# Patient Record
Sex: FEMALE | Race: WHITE
[De-identification: names, ages, dates, MRNs, and addresses within clinical notes are randomized per-mention and may not be internally consistent; named-entity substitution may affect disease eponyms.]

---

## 2017-05-09 PROBLEM — Z00.00 ENCOUNTER FOR PREVENTIVE HEALTH EXAMINATION: Status: ACTIVE | Noted: 2017-05-09

## 2017-08-18 ENCOUNTER — APPOINTMENT (OUTPATIENT)
Dept: INTERNAL MEDICINE | Facility: CLINIC | Age: 82
End: 2017-08-18

## 2018-04-14 ENCOUNTER — INPATIENT (INPATIENT)
Facility: HOSPITAL | Age: 83
LOS: 15 days | Discharge: NURSING, REHAB OR LONG-TERM CARE | End: 2018-04-30
Attending: INTERNAL MEDICINE | Admitting: INTERNAL MEDICINE
Payer: MEDICARE

## 2018-04-14 VITALS
SYSTOLIC BLOOD PRESSURE: 142 MMHG | OXYGEN SATURATION: 100 % | DIASTOLIC BLOOD PRESSURE: 89 MMHG | RESPIRATION RATE: 22 BRPM | HEART RATE: 100 BPM

## 2018-04-14 DIAGNOSIS — Z98.890 OTHER SPECIFIED POSTPROCEDURAL STATES: Chronic | ICD-10-CM

## 2018-04-14 LAB
ALBUMIN SERPL ELPH-MCNC: 3.2 G/DL — LOW (ref 3.3–5)
ALP SERPL-CCNC: 78 U/L — SIGNIFICANT CHANGE UP (ref 40–120)
ALT FLD-CCNC: 17 U/L — SIGNIFICANT CHANGE UP (ref 12–78)
ANION GAP SERPL CALC-SCNC: 10 MMOL/L — SIGNIFICANT CHANGE UP (ref 5–17)
APPEARANCE UR: CLEAR — SIGNIFICANT CHANGE UP
APTT BLD: 25 SEC — LOW (ref 27.5–37.4)
AST SERPL-CCNC: 24 U/L — SIGNIFICANT CHANGE UP (ref 15–37)
BACTERIA # UR AUTO: (no result)
BASOPHILS # BLD AUTO: 0.07 K/UL — SIGNIFICANT CHANGE UP (ref 0–0.2)
BASOPHILS NFR BLD AUTO: 0.5 % — SIGNIFICANT CHANGE UP (ref 0–2)
BILIRUB SERPL-MCNC: 0.4 MG/DL — SIGNIFICANT CHANGE UP (ref 0.2–1.2)
BILIRUB UR-MCNC: NEGATIVE — SIGNIFICANT CHANGE UP
BUN SERPL-MCNC: 22 MG/DL — SIGNIFICANT CHANGE UP (ref 7–23)
CALCIUM SERPL-MCNC: 9.6 MG/DL — SIGNIFICANT CHANGE UP (ref 8.5–10.1)
CHLORIDE SERPL-SCNC: 103 MMOL/L — SIGNIFICANT CHANGE UP (ref 96–108)
CK SERPL-CCNC: 58 U/L — SIGNIFICANT CHANGE UP (ref 26–192)
CO2 SERPL-SCNC: 27 MMOL/L — SIGNIFICANT CHANGE UP (ref 22–31)
COLOR SPEC: YELLOW — SIGNIFICANT CHANGE UP
COMMENT - URINE: SIGNIFICANT CHANGE UP
CREAT SERPL-MCNC: 0.88 MG/DL — SIGNIFICANT CHANGE UP (ref 0.5–1.3)
DIFF PNL FLD: (no result)
EOSINOPHIL # BLD AUTO: 0.07 K/UL — SIGNIFICANT CHANGE UP (ref 0–0.5)
EOSINOPHIL NFR BLD AUTO: 0.5 % — SIGNIFICANT CHANGE UP (ref 0–6)
EPI CELLS # UR: SIGNIFICANT CHANGE UP
GLUCOSE SERPL-MCNC: 131 MG/DL — HIGH (ref 70–99)
GLUCOSE UR QL: NEGATIVE MG/DL — SIGNIFICANT CHANGE UP
HBA1C BLD-MCNC: 5.2 % — SIGNIFICANT CHANGE UP (ref 4–5.6)
HCT VFR BLD CALC: 41.2 % — SIGNIFICANT CHANGE UP (ref 34.5–45)
HGB BLD-MCNC: 13.8 G/DL — SIGNIFICANT CHANGE UP (ref 11.5–15.5)
IMM GRANULOCYTES NFR BLD AUTO: 0.5 % — SIGNIFICANT CHANGE UP (ref 0–1.5)
INR BLD: 1.04 RATIO — SIGNIFICANT CHANGE UP (ref 0.88–1.16)
KETONES UR-MCNC: NEGATIVE — SIGNIFICANT CHANGE UP
LEUKOCYTE ESTERASE UR-ACNC: (no result)
LYMPHOCYTES # BLD AUTO: 18 % — SIGNIFICANT CHANGE UP (ref 13–44)
LYMPHOCYTES # BLD AUTO: 2.69 K/UL — SIGNIFICANT CHANGE UP (ref 1–3.3)
MCHC RBC-ENTMCNC: 30.3 PG — SIGNIFICANT CHANGE UP (ref 27–34)
MCHC RBC-ENTMCNC: 33.5 GM/DL — SIGNIFICANT CHANGE UP (ref 32–36)
MCV RBC AUTO: 90.5 FL — SIGNIFICANT CHANGE UP (ref 80–100)
MONOCYTES # BLD AUTO: 0.62 K/UL — SIGNIFICANT CHANGE UP (ref 0–0.9)
MONOCYTES NFR BLD AUTO: 4.1 % — SIGNIFICANT CHANGE UP (ref 2–14)
NEUTROPHILS # BLD AUTO: 11.42 K/UL — HIGH (ref 1.8–7.4)
NEUTROPHILS NFR BLD AUTO: 76.4 % — SIGNIFICANT CHANGE UP (ref 43–77)
NITRITE UR-MCNC: NEGATIVE — SIGNIFICANT CHANGE UP
NRBC # BLD: 0 /100 WBCS — SIGNIFICANT CHANGE UP (ref 0–0)
PH UR: 5 — SIGNIFICANT CHANGE UP (ref 5–8)
PLATELET # BLD AUTO: 492 K/UL — HIGH (ref 150–400)
POTASSIUM SERPL-MCNC: 3.9 MMOL/L — SIGNIFICANT CHANGE UP (ref 3.5–5.3)
POTASSIUM SERPL-SCNC: 3.9 MMOL/L — SIGNIFICANT CHANGE UP (ref 3.5–5.3)
PROT SERPL-MCNC: 7 GM/DL — SIGNIFICANT CHANGE UP (ref 6–8.3)
PROT UR-MCNC: 30 MG/DL
PROTHROM AB SERPL-ACNC: 11.2 SEC — SIGNIFICANT CHANGE UP (ref 9.8–12.7)
RBC # BLD: 4.55 M/UL — SIGNIFICANT CHANGE UP (ref 3.8–5.2)
RBC # FLD: 12.7 % — SIGNIFICANT CHANGE UP (ref 10.3–14.5)
RBC CASTS # UR COMP ASSIST: SIGNIFICANT CHANGE UP /HPF (ref 0–4)
SODIUM SERPL-SCNC: 140 MMOL/L — SIGNIFICANT CHANGE UP (ref 135–145)
SP GR SPEC: 1.01 — SIGNIFICANT CHANGE UP (ref 1.01–1.02)
TROPONIN I SERPL-MCNC: <0.015 NG/ML — SIGNIFICANT CHANGE UP (ref 0.01–0.04)
TSH SERPL-MCNC: 0.01 UIU/ML — LOW (ref 0.36–3.74)
UROBILINOGEN FLD QL: NEGATIVE MG/DL — SIGNIFICANT CHANGE UP
WBC # BLD: 14.95 K/UL — HIGH (ref 3.8–10.5)
WBC # FLD AUTO: 14.95 K/UL — HIGH (ref 3.8–10.5)
WBC UR QL: SIGNIFICANT CHANGE UP

## 2018-04-14 PROCEDURE — 70496 CT ANGIOGRAPHY HEAD: CPT | Mod: 26

## 2018-04-14 PROCEDURE — 99291 CRITICAL CARE FIRST HOUR: CPT

## 2018-04-14 PROCEDURE — 70450 CT HEAD/BRAIN W/O DYE: CPT | Mod: 26,59

## 2018-04-14 PROCEDURE — 99285 EMERGENCY DEPT VISIT HI MDM: CPT

## 2018-04-14 PROCEDURE — 93010 ELECTROCARDIOGRAM REPORT: CPT

## 2018-04-14 PROCEDURE — 70551 MRI BRAIN STEM W/O DYE: CPT | Mod: 26

## 2018-04-14 RX ORDER — METOPROLOL TARTRATE 50 MG
25 TABLET ORAL
Qty: 0 | Refills: 0 | Status: DISCONTINUED | OUTPATIENT
Start: 2018-04-14 | End: 2018-04-30

## 2018-04-14 RX ORDER — LEVOTHYROXINE SODIUM 125 MCG
125 TABLET ORAL DAILY
Qty: 0 | Refills: 0 | Status: DISCONTINUED | OUTPATIENT
Start: 2018-04-15 | End: 2018-04-16

## 2018-04-14 RX ORDER — LEVOTHYROXINE SODIUM 125 MCG
62.5 TABLET ORAL ONCE
Qty: 0 | Refills: 0 | Status: COMPLETED | OUTPATIENT
Start: 2018-04-14 | End: 2018-04-14

## 2018-04-14 RX ORDER — ASPIRIN/CALCIUM CARB/MAGNESIUM 324 MG
325 TABLET ORAL ONCE
Qty: 0 | Refills: 0 | Status: COMPLETED | OUTPATIENT
Start: 2018-04-14 | End: 2018-04-14

## 2018-04-14 RX ORDER — ATORVASTATIN CALCIUM 80 MG/1
80 TABLET, FILM COATED ORAL AT BEDTIME
Qty: 0 | Refills: 0 | Status: DISCONTINUED | OUTPATIENT
Start: 2018-04-14 | End: 2018-04-30

## 2018-04-14 RX ORDER — ASPIRIN/CALCIUM CARB/MAGNESIUM 324 MG
81 TABLET ORAL DAILY
Qty: 0 | Refills: 0 | Status: DISCONTINUED | OUTPATIENT
Start: 2018-04-15 | End: 2018-04-19

## 2018-04-14 RX ORDER — HEPARIN SODIUM 5000 [USP'U]/ML
5000 INJECTION INTRAVENOUS; SUBCUTANEOUS EVERY 12 HOURS
Qty: 0 | Refills: 0 | Status: DISCONTINUED | OUTPATIENT
Start: 2018-04-14 | End: 2018-04-16

## 2018-04-14 RX ADMIN — HEPARIN SODIUM 5000 UNIT(S): 5000 INJECTION INTRAVENOUS; SUBCUTANEOUS at 17:10

## 2018-04-14 RX ADMIN — Medication 325 MILLIGRAM(S): at 11:11

## 2018-04-14 RX ADMIN — Medication 25 MILLIGRAM(S): at 17:11

## 2018-04-14 NOTE — H&P ADULT - ASSESSMENT
87 year old female with pmhx HTN, unknown valvular disease and hypothyroidism present with acute fall and onset of RUE and RLE weakness.     1. RUE and RLE weakness; acute CVA confirmed on CTA and MRI. NIHSS score of 6; not a candidate for TPA due to timing. Discussed case with Dr. Cook at [facility], again deemed not a candidate for neurovascular intervention  -received  mg po x 1 dose. Will continue with 81 mg po daily starting tomorrow  -high dose statin; atorvastatin 80 mg po daily  -swallow evaluation, NPO until then  -permissive HTN, will attempt to maintain SBP around 180 mmg Hg  -neurology recommendations appreciated; will hold off AC over next 24 to 48 hours  -q1hr neurochecks, fall precautions  -PT/OT    2. New onset atrial fibrillation  -CHADS VASC - needs AC, but will hold until neuro clearance  -continue with rate control, given dose of cardizem in ED, will start on cardizem prn for now  -TTE    3. HTN on bystolic at home  -cardizem for now    4. Hypothyroidism  -TSH  -will restart home dose of levothyroxine 125 mcg  po daily, will give IV dose now 62.5 mcg in setting of npo status    DVT ppx with SCDs and Hep ppx dosing for now 87 year old female with pmhx HTN, unknown valvular disease and hypothyroidism present with acute fall and onset of RUE and RLE weakness.     1. RUE and RLE weakness; acute CVA confirmed on CTA and MRI. NIHSS score of 6; not a candidate for TPA due to timing. Discussed case with Dr. Cook at [facility], again deemed not a candidate for neurovascular intervention  -received  mg po x 1 dose. Will continue with 81 mg po daily starting tomorrow  -high dose statin; atorvastatin 80 mg po daily  -swallow evaluation, NPO until then  -permissive HTN, will attempt to maintain SBP around 180 mmg Hg  -neurology recommendations appreciated; will hold off AC over next 24 to 48 hours  -q1hr neurochecks, fall precautions  -PT/OT    2. New onset atrial fibrillation  -based on CHADS VASC score - will require AC, but will hold until neuro clearance  -continue with rate control, given dose of cardizem in ED, will start on metoprolol BID for now  -TTE    3. HTN on bystolic at home  -metoprolol BID for now    4. Hypothyroidism  -TSH  -will restart home dose of levothyroxine 125 mcg  po daily, will give IV dose now 62.5 mcg and start on po once tolerates diet    DVT ppx with SCDs and Hep ppx dosing for now

## 2018-04-14 NOTE — H&P ADULT - NSHPPHYSICALEXAM_GEN_ALL_CORE
Gen: AAOx3, NAD  HEENT: NCAT, EOMI  Neck: Supple  CV: nml S1S2, irregularly irregular, noted apical and RSB holosystolic murmur 4/6  Lungs: CTABL  Abd: Soft, NT, ND, BS+  Ext: No edema  Neuro: 2/5 strength of RUE, 0/5 strength of RLE, sensation intact in all extremities, CN II-XII intact

## 2018-04-14 NOTE — ED ADULT NURSE NOTE - OBJECTIVE STATEMENT
Pt reports right upper extremity weakness that is different from her normal. Pt able to move arm and hand, but unable to sustain any lifting of arm. Pt reports chronic weakness of right lower extremity s/p scarlet fever, but thinks that there might be increased weakness. Pt unable to definitively describe normal level of strength. Pt Pt reports right upper extremity weakness that is different from her normal. Pt able to move arm and hand, but unable to sustain any lifting of arm. Pt reports chronic weakness of right lower extremity s/p scarlet fever, but thinks that there might be increased weakness. Pt unable to definitively describe normal level of strength. Pt to CT from ambulance triage. Currently denies pain, but reports that she was feeling dizzy this am.  Pt is alert and able to answer questions appropriately and make needs known, but is vague on some medical details.  Pt is somewhat repetitive with some requests and questions.  Unknown baseline status.  Unknown start time for symptoms. Pt states she woke with symptoms.  Unable to determine if she had any arm weakness yesterday and pt is vague on details at times.  Resp even and unlabored.

## 2018-04-14 NOTE — ED PROVIDER NOTE - MEDICAL DECISION MAKING DETAILS
pt with possible new onset afib with sroke symptoms will get ct and wait for labs to get cta as pt symptoms are waxing and waning

## 2018-04-14 NOTE — CONSULT NOTE ADULT - SUBJECTIVE AND OBJECTIVE BOX
· Reason for Referral/Consultation:	Atrial flutter and sroke	      · Subjective and Objective: 	  Patient is a 87y old  Female who presents with a chief complaint of Rt side weakness woke up with sx this morning.    HPI:  She is 88 Yo Female brought to ER with woke up this morning with Rt side arm and leg weakness went to bed last night at 10 pm. Could not move Rt arm or leg well with difficulty with her speech noted in ED. Not a TPA candidate but stat CT/ cTA performed noted Thrombus in left   MCA and ED physician on called  [facility] stroke team spoke with Dr. Cook and decided not a candidate for Thrombectomy and rec admission at  and further rx. Pt denies of any prior neurological sx and headaches, chest pain, SOB or palpitations. H/o Hypothyroidism takes medication. Lives home alone. Being followed by Dr. Blackburn but doesn't recall when she was seen last.     Examined at bed side, feels better, denies angina, orthopnea, PND, leg swelling, palpitations, syncope. Has no h/o cardiac arrhythmia or CAD.      PAST MEDICAL & SURGICAL HISTORY:  Hypothyroidism  Cataract surgery      FAMILY HISTORY:N/a      Social Hx:  Nonsmoker, no drug or alcohol use    MEDICATIONS  (STANDING):   Synthroid    Allergies    No Known Allergies      ROS: Pertinent positives in HPI, all other ROS were reviewed and are negative.      Vital Signs Last 24 Hrs  T(C): 36.3 (14 Apr 2018 09:33), Max: 36.3 (14 Apr 2018 09:33)  T(F): 97.3 (14 Apr 2018 09:33), Max: 97.3 (14 Apr 2018 09:33)  HR: 100 (14 Apr 2018 11:13) (93 - 100)  BP: 157/54 (14 Apr 2018 12:58) (139/50 - 162/57)  BP(mean): --  RR: 18 (14 Apr 2018 12:58) (18 - 22)  SpO2: 100% (14 Apr 2018 12:58) (97% - 100%)        Constitutional: awake and alert, mild dysarthria  HEENT: PERRLA, EOMI,   Neck: Supple.  Respiratory: Breath sounds are clear bilaterally  Cardiovascular: S1 and S2,  irregular rhythm  Gastrointestinal: soft, nontender  Extremities:  no edema  Vascular:  Carotid Bruit - no  Musculoskeletal: no joint swelling/tenderness.  Skin: No rashes    Neurological exam:  HF: A x O x 3. Appropriately interactive, normal affect. Speech dysarthric, occ word finding difficulty  CN: DIVYA, EOMI, VF, Rt Facial weakness with droop, gag intact.   Motor: Rt side drift with weakness Rt UE 3-4/5 Rt LE 3/5 left side 5/5   Sens: Intact to light touch   Reflexes:  +2 Brisk on Rt > than left Plantars Up going on Rt down on left  Coord:  can not test on rt    Gait/Balance: Cannot test    NIHSS: 7          Labs:   04-14    140  |  103  |  22  ----------------------------<  131<H>  3.9   |  27  |  0.88    Ca    9.6      14 Apr 2018 08:11    TPro  7.0  /  Alb  3.2<L>  /  TBili  0.4  /  DBili  x   /  AST  24  /  ALT  17  /  AlkPhos  78  04-14                              13.8   14.95 )-----------( 492      ( 14 Apr 2018 08:11 )             41.2       Radiology:  - CT Head:4/14/18  < from: CT Brain Stroke Protocol (04.14.18 @ 08:02) >  Impression:    Age related involutional and microvascular ischemic changes, without   evidence of intracranial hemorrhage, mass effect or midline shift.     CTA carotids/Brain  < from: CT Angio Head w/ IV Cont (04.14.18 @ 10:09) >  IMPRESSION:          1.   Right carotid system:  No hemodynamically significant stenosis.          2.   Left carotid system:  moderate calcified plaque resulting in a   high-grade (80-90%) stenosis at the origin of the LEFT internal carotid   artery and mild stenosis at the origin of the LEFT external carotid   artery.         3.   Intracranial circulation: Occlusion with cut off of the mid LEFT   M1 segment with a 1 cm thrombus. There is faint flow in distal LEFT MCA   branches.        4.   Brain:  No significant lesion identified.      - MRI brain 4/14/18    < from: MR Head No Cont (04.14.18 @ 12:13) >  IMPRESSION: Acute small left basal ganglia/corona radiata infarct.   Findings discussed with the referring team by Dr. Pederson.              Assessment and Recommendation:   · Assessment		  86 y/o F p/w RLE and RUE weakness. Pt states she awoke this morning (went to bed around 10pm) and could not move her arm or leg which prompted her to have EMS bring her to the ER CT/CTA +ve for acute thrombus in left MCA ED Physician Dr. Richardson spoke with Dr. Chase from stroke center he states pt is not a candidate for thrombectomy tba to [facility]     * New onset AF, fairly rate controlled at the moment. CHADSVASC score 7 ( HTN, Age >75, Stroke, Vascular disease, female).  Needs AC when safe from neuro stand point, agree with UFH first and when stroke is stable then possibly with a NOAC.  IF needed for rate control, can start PO diltiazem if able to tolerate oral route, otherwise can use IV drip.  2d echo, tele.    * Left carotid artery stenosis, ipsilateral to stroke and > 70% stenosis, agree with obtaining opinion from  vascular surgery re: CEA vs PTA. Otherwise, cont antiplatelet, statin. This stroke could be embolic from AF but also artery to artery embolization is possible.     Will follow.

## 2018-04-14 NOTE — ED ADULT TRIAGE NOTE - CHIEF COMPLAINT QUOTE
Pt complains of slurred speech and R arm weakness upon awakening this morning.  Right arm weak, no speech deficit, on initial assessment.  Code Stroke 0752 per Dr. Richardson.

## 2018-04-14 NOTE — ED PROVIDER NOTE - PROGRESS NOTE DETAILS
Scribe DG: due to pt awaking with Sx, tPA is not indicated, will order CTA to r/o thrombus. Junaid FORREST: notified by radiology of a 1cm thrombus present on CTA, will consult neurology and transfer pt to [facility] for thrombectomy Junaid FORREST: case d/w Dr. Dsouza of neurology, recommends consulting [facility] for transfer. spoke with Dr. Chase from stroke center he states pt is not a candidate for thrombectomy tba to [facility]. JOVANNY Richardson DO

## 2018-04-14 NOTE — H&P ADULT - HISTORY OF PRESENT ILLNESS
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 Patient states at roughly 4:30 AM this morning as she was getting out of bed to go to bathroom, getting dizzy and falling to ground. She reports no head trauma during the event. She states she was on floor for 2 hours, and had difficulty getting up to her feet. A phone was noted nearby and she called 911. She notes during her time on the floor having noted right arm weakness  and leg weakness that was intermittent but progressively worsening. She notes having falls in the past due to "2 bum knees" and having difficulty getting up due to her joint issues. She states yesterday feeling well and reports no weakness, dizziness, palpitations, chest pain or shortness of breath. In ED, it was noted that patient had atrial fibrillation rhythm and CTA head demonstrated occlusion with cut off of the mid left M1 segment with a 1 cm thrombus and high grade stenosis of left carotid artery. Dr. [facility] was [facility] by ED attending for probable neurovascular intervention but was deemed to be not a candidate by Dr. Cook due to age, lesion location and NIHSS score. She is presently laying in bed, resting comfortably, concerned regarding her arm and leg weakness but otherwise reports no dizziness, headache, vision changes.

## 2018-04-14 NOTE — ED PROVIDER NOTE - OBJECTIVE STATEMENT
86 y/o F p/w RLE and RUE weakness. Pt states she awoke this morning and could not move her arm or leg which prompted her to have EMS bring her to the ED. Sx noticed upon awaking. No CP, SOB, v/d. Code Stroke called. Source is a poor historian, states she had past issues with her RLE and is unsure if this is changed from her baseline. 88 y/o F p/w RLE and RUE weakness. Pt states she awoke this morning (went to bed around 10pm) and could not move her arm or leg which prompted her to have EMS bring her to the ED. Sx noticed upon awaking. No CP, SOB, v/d. Code Stroke called. Source is a poor historian, states she had past issues with her RLE and is unsure if this is changed from her baseline.

## 2018-04-14 NOTE — PATIENT PROFILE ADULT. - PRO SERVICES AT [facility]
Referral received via [facility] from MONAE Shelton MD who saw patient in  12/7/2017 for upper airway cough syndrome.  Patient was encouraged to establish with a PCP.    Writer placed call to patient -message left.     
home health care

## 2018-04-14 NOTE — H&P ADULT - NSHPREVIEWOFSYSTEMS_GEN_ALL_CORE
(-)Fever, chills, cough, chest pain, sob, headache, dizziness, palpitations, abd pain, n/v/d, leg swelling  (+)weakness

## 2018-04-14 NOTE — CONSULT NOTE ADULT - SUBJECTIVE AND OBJECTIVE BOX
Patient is a 87y old  Female who presents with a chief complaint of Rt side weakness woke up with sx this morning.    HPI:  She is 88 Yo Female brought to ER with woke up this morning with Rt side arm and leg weakness went to bed last night at 10 pm. Could not move Rt arm or leg well with difficulty with her speech noted in ED. Not a TPA candidate but stat CT/ cTA performed noted Thrombus in left   MCA and ED physician on called  [facility] stroke team spoke with Dr. Cook and decided not a candidate for Thrombectomy and rec admission at  and further rx. Pt denies of any prior neurological sx and headaches, chest pain, SOB or palpitations. H/o Hypothyroidism takes medication. Lives home alone. Being followed by Dr. Blackburn but doesn't recall when she was seen last.       PAST MEDICAL & SURGICAL HISTORY:  Hypothyroidism  Cataract surgery      FAMILY HISTORY:N/a      Social Hx:  Nonsmoker, no drug or alcohol use    MEDICATIONS  (STANDING):   Synthroid    Allergies    No Known Allergies      ROS: Pertinent positives in HPI, all other ROS were reviewed and are negative.      Vital Signs Last 24 Hrs  T(C): 36.3 (14 Apr 2018 09:33), Max: 36.3 (14 Apr 2018 09:33)  T(F): 97.3 (14 Apr 2018 09:33), Max: 97.3 (14 Apr 2018 09:33)  HR: 100 (14 Apr 2018 11:13) (93 - 100)  BP: 157/54 (14 Apr 2018 12:58) (139/50 - 162/57)  BP(mean): --  RR: 18 (14 Apr 2018 12:58) (18 - 22)  SpO2: 100% (14 Apr 2018 12:58) (97% - 100%)        Constitutional: awake and alert, mild dysarthria  HEENT: PERRLA, EOMI,   Neck: Supple.  Respiratory: Breath sounds are clear bilaterally  Cardiovascular: S1 and S2,  irregular rhythm  Gastrointestinal: soft, nontender  Extremities:  no edema  Vascular:  Carotid Bruit - no  Musculoskeletal: no joint swelling/tenderness.  Skin: No rashes    Neurological exam:  HF: A x O x 3. Appropriately interactive, normal affect. Speech dysarthric, occ word finding difficulty  CN: DIVYA, EOMI, VF, Rt Facial weakness with droop, gag intact.   Motor: Rt side drift with weakness Rt UE 3-4/5 Rt LE 3/5 left side 5/5   Sens: Intact to light touch   Reflexes:  +2 Brisk on Rt > than left Plantars Up going on Rt down on left  Coord:  can not test on rt    Gait/Balance: Cannot test    NIHSS: 7          Labs:   04-14    140  |  103  |  22  ----------------------------<  131<H>  3.9   |  27  |  0.88    Ca    9.6      14 Apr 2018 08:11    TPro  7.0  /  Alb  3.2<L>  /  TBili  0.4  /  DBili  x   /  AST  24  /  ALT  17  /  AlkPhos  78  04-14                              13.8   14.95 )-----------( 492      ( 14 Apr 2018 08:11 )             41.2       Radiology:  - CT Head:4/14/18  < from: CT Brain Stroke Protocol (04.14.18 @ 08:02) >  Impression:    Age related involutional and microvascular ischemic changes, without   evidence of intracranial hemorrhage, mass effect or midline shift.     CTA carotids/Brain  < from: CT Angio Head w/ IV Cont (04.14.18 @ 10:09) >  IMPRESSION:          1.   Right carotid system:  No hemodynamically significant stenosis.          2.   Left carotid system:  moderate calcified plaque resulting in a   high-grade (80-90%) stenosis at the origin of the LEFT internal carotid   artery and mild stenosis at the origin of the LEFT external carotid   artery.         3.   Intracranial circulation: Occlusion with cut off of the mid LEFT   M1 segment with a 1 cm thrombus. There is faint flow in distal LEFT MCA   branches.        4.   Brain:  No significant lesion identified.      - MRI brain 4/14/18    < from: MR Head No Cont (04.14.18 @ 12:13) >  IMPRESSION: Acute small left basal ganglia/corona radiata infarct.   Findings discussed with the referring team by Dr. Pederson.

## 2018-04-14 NOTE — CONSULT NOTE ADULT - ASSESSMENT
86 y/o F p/w RLE and RUE weakness. Pt states she awoke this morning (went to bed around 10pm) and could not move her arm or leg which prompted her to have EMS bring her to the ER CT/CTA +ve for acute throbus in left MCA ED Physician Dr. Richardson spoke with Dr. Chase from stroke center he states pt is not a candidate for thrombectomy tba to [facility].     Acute Left CVA with Rt Hemiparesis Outside the window for TPA  With New onset AF.  Acute Infarct on MRI scan.  REc admit to telemetry/ cardiac admission with close neuro checks.  Convert to sinus rhythm  with medical management.  ASa/ plavix/ statin.  Hold AC for 24- 48 hrs. if start too early might convert into hemorrhagic CVA with heparin.  Speech/swallow evaluation.  PT/ OT.  Cardiology F/u.  Vascular surgery consult with  for left carotid stenosis.  will f/u.

## 2018-04-14 NOTE — H&P ADULT - NSHPLABSRESULTS_GEN_ALL_CORE
T(C): 36.7 (18 @ 14:57), Max: 36.7 (18 @ 14:57)  HR: 93 (18 @ 14:57) (93 - 100)  BP: 144/83 (18 @ 14:57) (139/50 - 162/57)  RR: 18 (18 @ 14:57) (18 - 22)  SpO2: 98% (18 @ 14:57) (97% - 100%)  Wt(kg): --    CARDIAC MARKERS ( 2018 08:11 )  <0.015 ng/mL / x     / 58 U/L / x     / x                                13.8   14.95 )-----------( 492      ( 2018 08:11 )             41.2     2018 08:11    140    |  103    |  22     ----------------------------<  131    3.9     |  27     |  0.88     Ca    9.6        2018 08:11    TPro  7.0    /  Alb  3.2    /  TBili  0.4    /  DBili  x      /  AST  24     /  ALT  17     /  AlkPhos  78     2018 08:11    PT/INR - ( 2018 08:11 )   PT: 11.2 sec;   INR: 1.04 ratio         PTT - ( 2018 08:11 )  PTT:25.0 sec  CAPILLARY BLOOD GLUCOSE      POCT Blood Glucose.: 129 mg/dL (2018 08:07)    LIVER FUNCTIONS - ( 2018 08:11 )  Alb: 3.2 g/dL / Pro: 7.0 gm/dL / ALK PHOS: 78 U/L / ALT: 17 U/L / AST: 24 U/L / GGT: x           Urinalysis Basic - ( 2018 08:11 )    Color: Yellow / Appearance: Clear / S.010 / pH: x  Gluc: x / Ketone: Negative  / Bili: Negative / Urobili: Negative mg/dL   Blood: x / Protein: 30 mg/dL / Nitrite: Negative   Leuk Esterase: Trace / RBC: 0-2 /HPF / WBC 0-2   Sq Epi: x / Non Sq Epi: Few / Bacteria: Occasional      CTA head:  IMPRESSION:          1.   Right carotid system:  No hemodynamically significant stenosis.          2.   Left carotid system:  moderate calcified plaque resulting in a   high-grade (80-90%) stenosis at the origin of the LEFT internal carotid   artery and mild stenosis at the origin of the LEFT external carotid   artery.         3.   Intracranial circulation: Occlusion with cut off of the mid LEFT   M1 segment with a 1 cm thrombus. There is faint flow in distal LEFT MCA   branches.        4.   Brain:  No significant lesion identified.          EXAM:  MR BRAIN                            PROCEDURE DATE:  2018          INTERPRETATION:  CLINICAL HISTORY: Right arm weakness    COMPARISON: CT head dated 2018    TECHNIQUE: MRI brain: Multiplanar, multisequence MR imaging of the brain   are obtained without the administration of intravenous gadolinium.     FINDINGS:  There is a small area of abnormal restricted diffusion in the posterior   left corona radiata extending into the basal ganglia inferiorly on images   45 through 48 of series 3. Old small left inferior cerebellar hemisphere   lacunar infarct.. Scattered periventricular and subcortical white matter   T2 /FLAIR hyperintensities are seen without mass effect, nonspecific,   likely representing mild to chronic microvascular changes.    Normal T2 flow-voids are seen within  the intracranial vasculature. The   lateral ventricles and cortical sulci are age-appropriate in size and   configuration. There is no mass, mass effect, or extra-axial fluid   collection. There is no susceptibility artifact to suggest hemorrhage.   Midline structures are normal. The patient is status post left ocular   lens replacement surgery. The visualized paranasal sinuses, mastoid air   cells and orbits are otherwise unremarkable.      IMPRESSION: Acute small left basal ganglia/corona radiata infarct.

## 2018-04-15 LAB
ANION GAP SERPL CALC-SCNC: 9 MMOL/L — SIGNIFICANT CHANGE UP (ref 5–17)
APTT BLD: 28.1 SEC — SIGNIFICANT CHANGE UP (ref 27.5–37.4)
BUN SERPL-MCNC: 20 MG/DL — SIGNIFICANT CHANGE UP (ref 7–23)
CALCIUM SERPL-MCNC: 9 MG/DL — SIGNIFICANT CHANGE UP (ref 8.5–10.1)
CHLORIDE SERPL-SCNC: 105 MMOL/L — SIGNIFICANT CHANGE UP (ref 96–108)
CHOLEST SERPL-MCNC: 191 MG/DL — SIGNIFICANT CHANGE UP (ref 10–199)
CO2 SERPL-SCNC: 26 MMOL/L — SIGNIFICANT CHANGE UP (ref 22–31)
CREAT SERPL-MCNC: 0.68 MG/DL — SIGNIFICANT CHANGE UP (ref 0.5–1.3)
ESTIMATED AVERAGE GLUCOSE: 105 MG/DL — SIGNIFICANT CHANGE UP (ref 68–114)
GLUCOSE SERPL-MCNC: 101 MG/DL — HIGH (ref 70–99)
HBA1C BLD-MCNC: 5.3 % — SIGNIFICANT CHANGE UP (ref 4–5.6)
HBA1C BLD-MCNC: 5.3 % — SIGNIFICANT CHANGE UP (ref 4–5.6)
HDLC SERPL-MCNC: 47 MG/DL — SIGNIFICANT CHANGE UP (ref 40–125)
INR BLD: 1.12 RATIO — SIGNIFICANT CHANGE UP (ref 0.88–1.16)
LIPID PNL WITH DIRECT LDL SERPL: 125 MG/DL — SIGNIFICANT CHANGE UP
POTASSIUM SERPL-MCNC: 3.9 MMOL/L — SIGNIFICANT CHANGE UP (ref 3.5–5.3)
POTASSIUM SERPL-SCNC: 3.9 MMOL/L — SIGNIFICANT CHANGE UP (ref 3.5–5.3)
PROTHROM AB SERPL-ACNC: 12.1 SEC — SIGNIFICANT CHANGE UP (ref 9.8–12.7)
SODIUM SERPL-SCNC: 140 MMOL/L — SIGNIFICANT CHANGE UP (ref 135–145)
TOTAL CHOLESTEROL/HDL RATIO MEASUREMENT: 4.1 RATIO — SIGNIFICANT CHANGE UP (ref 3.3–7.1)
TRIGL SERPL-MCNC: 93 MG/DL — SIGNIFICANT CHANGE UP (ref 10–149)

## 2018-04-15 PROCEDURE — 99233 SBSQ HOSP IP/OBS HIGH 50: CPT

## 2018-04-15 RX ORDER — WARFARIN SODIUM 2.5 MG/1
2.5 TABLET ORAL DAILY
Qty: 0 | Refills: 0 | Status: DISCONTINUED | OUTPATIENT
Start: 2018-04-15 | End: 2018-04-16

## 2018-04-15 RX ADMIN — HEPARIN SODIUM 5000 UNIT(S): 5000 INJECTION INTRAVENOUS; SUBCUTANEOUS at 05:47

## 2018-04-15 RX ADMIN — Medication 25 MILLIGRAM(S): at 17:49

## 2018-04-15 RX ADMIN — Medication 81 MILLIGRAM(S): at 11:48

## 2018-04-15 RX ADMIN — WARFARIN SODIUM 2.5 MILLIGRAM(S): 2.5 TABLET ORAL at 21:35

## 2018-04-15 RX ADMIN — Medication 125 MICROGRAM(S): at 05:47

## 2018-04-15 RX ADMIN — HEPARIN SODIUM 5000 UNIT(S): 5000 INJECTION INTRAVENOUS; SUBCUTANEOUS at 17:49

## 2018-04-15 NOTE — PROGRESS NOTE ADULT - SUBJECTIVE AND OBJECTIVE BOX
· Reason for Referral/Consultation:	Atrial flutter and sroke	      · Subjective and Objective: 	  Patient is a 87y old  Female who presents with a chief complaint of Rt side weakness woke up with sx this morning.    HPI:  She is 88 Yo Female brought to ER with woke up this morning with Rt side arm and leg weakness went to bed last night at 10 pm. Could not move Rt arm or leg well with difficulty with her speech noted in ED. Not a TPA candidate but stat CT/ cTA performed noted Thrombus in left   MCA and ED physician on called  [facility] stroke team spoke with Dr. Cook and decided not a candidate for Thrombectomy and rec admission at  and further rx. Pt denies of any prior neurological sx and headaches, chest pain, SOB or palpitations. H/o Hypothyroidism takes medication. Lives home alone. Being followed by Dr. Blackburn but doesn't recall when she was seen last.     Examined at bed side, feels better, denies angina, orthopnea, PND, leg swelling, palpitations, syncope. Has no h/o cardiac arrhythmia or CAD.    4/15 - no new complaints. Tele reviewed, AF rate controlled. Agree with BB for rate control. Stable from CV stand point.   Cont current regimen. Start AC when safe from neuro standpoint. Pending vascular surgery consult re: SID. Agree with high intensity statin and ASA. ECHO pending.   Dr Blackburn to follow up in AM.     PAST MEDICAL & SURGICAL HISTORY:  Hypothyroidism  Cataract surgery      FAMILY HISTORY:N/a      Social Hx:  Nonsmoker, no drug or alcohol use    MEDICATIONS  (STANDING):   Synthroid    Allergies    No Known Allergies      ROS: Pertinent positives in HPI, all other ROS were reviewed and are negative.      Vital Signs Last 24 Hrs  T(C): 36.3 (14 Apr 2018 09:33), Max: 36.3 (14 Apr 2018 09:33)  T(F): 97.3 (14 Apr 2018 09:33), Max: 97.3 (14 Apr 2018 09:33)  HR: 100 (14 Apr 2018 11:13) (93 - 100)  BP: 157/54 (14 Apr 2018 12:58) (139/50 - 162/57)  BP(mean): --  RR: 18 (14 Apr 2018 12:58) (18 - 22)  SpO2: 100% (14 Apr 2018 12:58) (97% - 100%)        Constitutional: awake and alert, mild dysarthria  HEENT: PERRLA, EOMI,   Neck: Supple.  Respiratory: Breath sounds are clear bilaterally  Cardiovascular: S1 and S2,  irregular rhythm  Gastrointestinal: soft, nontender  Extremities:  no edema  Vascular:  Carotid Bruit - no  Musculoskeletal: no joint swelling/tenderness.  Skin: No rashes    Neurological exam:  HF: A x O x 3. Appropriately interactive, normal affect. Speech dysarthric, occ word finding difficulty  CN: DIVYA, EOMI, VF, Rt Facial weakness with droop, gag intact.   Motor: Rt side drift with weakness Rt UE 3-4/5 Rt LE 3/5 left side 5/5   Sens: Intact to light touch   Reflexes:  +2 Brisk on Rt > than left Plantars Up going on Rt down on left  Coord:  can not test on rt    Gait/Balance: Cannot test    NIHSS: 7          Labs:   04-14    140  |  103  |  22  ----------------------------<  131<H>  3.9   |  27  |  0.88    Ca    9.6      14 Apr 2018 08:11    TPro  7.0  /  Alb  3.2<L>  /  TBili  0.4  /  DBili  x   /  AST  24  /  ALT  17  /  AlkPhos  78  04-14                              13.8   14.95 )-----------( 492      ( 14 Apr 2018 08:11 )             41.2       Radiology:  - CT Head:4/14/18  < from: CT Brain Stroke Protocol (04.14.18 @ 08:02) >  Impression:    Age related involutional and microvascular ischemic changes, without   evidence of intracranial hemorrhage, mass effect or midline shift.     CTA carotids/Brain  < from: CT Angio Head w/ IV Cont (04.14.18 @ 10:09) >  IMPRESSION:          1.   Right carotid system:  No hemodynamically significant stenosis.          2.   Left carotid system:  moderate calcified plaque resulting in a   high-grade (80-90%) stenosis at the origin of the LEFT internal carotid   artery and mild stenosis at the origin of the LEFT external carotid   artery.         3.   Intracranial circulation: Occlusion with cut off of the mid LEFT   M1 segment with a 1 cm thrombus. There is faint flow in distal LEFT MCA   branches.        4.   Brain:  No significant lesion identified.      - MRI brain 4/14/18    < from: MR Head No Cont (04.14.18 @ 12:13) >  IMPRESSION: Acute small left basal ganglia/corona radiata infarct.   Findings discussed with the referring team by Dr. Pederson.              Assessment and Recommendation:   · Assessment		  86 y/o F p/w RLE and RUE weakness. Pt states she awoke this morning (went to bed around 10pm) and could not move her arm or leg which prompted her to have EMS bring her to the ER CT/CTA +ve for acute thrombus in left MCA ED Physician Dr. Richardson spoke with Dr. Chase from stroke center he states pt is not a candidate for thrombectomy [facility]ba to [facility]     * New onset AF, fairly rate controlled at the moment. CHADSVASC score 7 ( HTN, Age >75, Stroke, Vascular disease, female).  Needs AC when safe from neuro stand point, agree with UFH first and when stroke is stable then possibly with a NOAC.  IF needed for rate control, can start PO diltiazem if able to tolerate oral route, otherwise can use IV drip.  2d echo, tele.    * Left carotid artery stenosis, ipsilateral to stroke and > 70% stenosis, agree with obtaining opinion from  vascular surgery re: CEA vs PTA. Otherwise, cont antiplatelet, statin. This stroke could be embolic from AF but also artery to artery embolization is possible.     Will follow.

## 2018-04-15 NOTE — PROGRESS NOTE ADULT - SUBJECTIVE AND OBJECTIVE BOX
· Requested by Name:		  · Date/Time:	14-Apr-2018 13:28	  · Reason for Referral/Consultation:	CVA with Rt side weakness	      · Subjective and Objective: 	  Patient is a 87y old  Female who presents with a chief complaint of Rt side weakness woke up with sx this morning.    HPI:  She is 88 Yo Female brought to ER with woke up this morning with Rt side arm and leg weakness went to bed last night at 10 pm. Could not move Rt arm or leg well with difficulty with her speech noted in ED. Not a TPA candidate but stat CT/ cTA performed noted Thrombus in left   MCA and ED physician on called  [facility] stroke team spoke with Dr. Cook and decided not a candidate for Thrombectomy and rec admission at  and further rx. Pt denies of any prior neurological sx and headaches, chest pain, SOB or palpitations. H/o Hypothyroidism takes medication. Lives home alone. Being followed by Dr. Blackburn but doesn't recall when she was seen last.     4/15/18 : Pt feeling better Speech improved and Rt UE weakness improved still feels weak she can not use as well as she wants. RT LE weakness persisting. NO Headaches, dizziness. Appreciated Cardiology consult and Echo and vascular surgery consults pending.     PAST MEDICAL & SURGICAL HISTORY:  Hypothyroidism  Cataract surgery    MEDICATIONS  (STANDING):  aspirin enteric coated 81 milliGRAM(s) Oral daily  atorvastatin 80 milliGRAM(s) Oral at bedtime  heparin  Injectable 5000 Unit(s) SubCutaneous every 12 hours  levothyroxine 125 MICROGram(s) Oral daily  metoprolol tartrate 25 milliGRAM(s) Oral two times a day      Vital Signs Last 24 Hrs  T(C): 36.6 (15 Apr 2018 05:13), Max: 36.7 (14 Apr 2018 14:57)  T(F): 97.9 (15 Apr 2018 05:13), Max: 98 (14 Apr 2018 14:57)  HR: 79 (15 Apr 2018 05:13) (79 - 100)  BP: 111/81 (15 Apr 2018 05:13) (104/60 - 162/57)  BP(mean): 86 (15 Apr 2018 05:13) (86 - 86)  RR: 98 (14 Apr 2018 16:20) (18 - 98)  SpO2: 99% (15 Apr 2018 05:13) (97% - 100%)     Constitutional: awake and alert,  dysarthria improved.  HEENT: PERRLA, EOMI,   Neck: Supple.  Respiratory: Breath sounds are clear bilaterally  Cardiovascular: S1 and S2,  irregular rhythm  Gastrointestinal: soft, nontender  Extremities:  no edema  Vascular:  Carotid Bruit - no  Musculoskeletal: no joint swelling/tenderness.  Skin: No rashes    Neurological exam:  HF: A x O x 3. Appropriately interactive, normal affect. Speech dysarthria improved.  CN: DIVYA, EOMI, VF, mild Rt Facial  droop, weakness improved,  gag intact.   Motor: Rt side drift with weakness Rt UE 4/5 Rt LE 4/5 left side 5/5   Sens: Intact to light touch   Reflexes:  +2 Brisk on Rt > than left Plantars Up going on Rt down on left  Coord:  can not test on rt    Gait/Balance: Cannot test                       13.8   14.95 )-----------( 492      ( 14 Apr 2018 08:11 )             41.2     04-15    140  |  105  |  20  ----------------------------<  101<H>  3.9   |  26  |  0.68    Ca    9.0      15 Apr 2018 07:20    TPro  7.0  /  Alb  3.2<L>  /  TBili  0.4  /  DBili  x   /  AST  24  /  ALT  17  /  AlkPhos  78  04-14 04-14 [id number] 5.2        Radiology report:  - CT Head:4/14/18  < from: CT Brain Stroke Protocol (04.14.18 @ 08:02) >  Impression:    Age related involutional and microvascular ischemic changes, without   evidence of intracranial hemorrhage, mass effect or midline shift.     CTA carotids/Brain  < from: CT Angio Head w/ IV Cont (04.14.18 @ 10:09) >  IMPRESSION:          1.   Right carotid system:  No hemodynamically significant stenosis.          2.   Left carotid system:  moderate calcified plaque resulting in a   high-grade (80-90%) stenosis at the origin of the LEFT internal carotid   artery and mild stenosis at the origin of the LEFT external carotid   artery.         3.   Intracranial circulation: Occlusion with cut off of the mid LEFT   M1 segment with a 1 cm thrombus. There is faint flow in distal LEFT MCA   branches.        4.   Brain:  No significant lesion identified.      - MRI brain 4/14/18    < from: MR Head No Cont (04.14.18 @ 12:13) >  IMPRESSION: Acute small left basal ganglia/corona radiata infarct.   Findings discussed with the referring team by Dr. Pederson.

## 2018-04-15 NOTE — PHYSICAL THERAPY INITIAL EVALUATION ADULT - PERTINENT HX OF CURRENT PROBLEM, REHAB EVAL
Pt. presents to  after having fall at home. Pt. stood up from bed, became dizzy and fell. Pt. was on floor for two hours until finally standing. Pt. also c/o increased R upper and lower extremity weakness. MRI: acute small basal ganglia/corona radiata infarct. Afib noted in ED

## 2018-04-15 NOTE — PROGRESS NOTE ADULT - ASSESSMENT
A/P    #Acute CVA-  ct asa/statin, echo to follow, ct tele   -supportive care     #Afib- discussed with Dr. Dsouza- start coumadin tonight     #ICA stensosis- us study to follow. Dr. Alexander evaluation appreciated     #PT, rehab placement     #above discussed with pt and pt's son after taking permission from pt-  Hqv-[phone]. All questions have been answered

## 2018-04-15 NOTE — CONSULT NOTE ADULT - SUBJECTIVE AND OBJECTIVE BOX
Consult Note:   · Provider Specialty	Vascular	    Referral/Consultation:   Initial Consult:  · Requested by Name:		  · Date/Time:	 	  · Reason for Referral/Consultation:	CVA with Rt side weakness, carotid stenosis	      · Subjective and Objective: 	  Patient is a 87y old  Female who presents with a chief complaint of Rt side weakness woke up with sx this morning.    HPI:  She is 88 Yo Female brought to ER with woke up this morning with Rt side arm and leg weakness after going to bed last night at 10 pm. Could not move Rt arm or leg well with difficulty with her speech noted in ED. Not a TPA candidate but stat CT/ cTA performed noted thrombus in left MCA and ED physician on called  [facility] stroke team and spoke with Dr. Cook who decided she was not a candidate for thrombectomy.  They recommended admission at [facility] and further rx. Pt denies of any prior neurological sx and headaches, chest pain, SOB or palpitations. H/o Hypothyroidism takes medication. Lives home alone. Being followed by Dr. Blackburn but doesn't recall when she was seen last.  Found to have AF while here and notes she has been breathless for several weeks with minimal exertion (new onset of AF?).        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      Vital Signs Last 24 Hrs  T(C): 36.3 (14 Apr 2018 09:33), Max: 36.3 (14 Apr 2018 09:33)  T(F): 97.3 (14 Apr 2018 09:33), Max: 97.3 (14 Apr 2018 09:33)  HR: 100 (14 Apr 2018 11:13) (93 - 100)  BP: 157/54 (14 Apr 2018 12:58) (139/50 - 162/57)  BP(mean): --  RR: 18 (14 Apr 2018 12:58) (18 - 22)  SpO2: 100% (14 Apr 2018 12:58) (97% - 100%)        Constitutional: awake and alert, mild dysarthria  HEENT: PERRLA, EOMI,   Neck: Supple.  Respiratory: Breath sounds are clear bilaterally  Cardiovascular: S1 and S2,  irregular rhythm  Gastrointestinal: soft, nontender  Extremities:  no edema  Vascular:  Carotid Bruit - no; both feet without ulcers, cyanosis or gangrene; R leg shorter than L; no palpable pedal pulse either foot  Musculoskeletal: no joint swelling/tenderness.  Skin: No rashes    Neurological exam:  HF: A x O x 3. Appropriately interactive, normal affect. Speech dysarthric, occ word finding difficulty  CN: DIVYA, EOMI, VF, Rt Facial weakness with droop, gag intact.   Motor: Rt side drift with weakness Rt UE 3-4/5 Rt LE 3/5 left side 5/5   Sens: Intact to light touch   Reflexes:  +2 Brisk on Rt > than left Plantars Up going on Rt down on left  Coord:  can not test on rt    Gait/Balance: Cannot test    NIHSS: 7          Labs:   04-14    140  |  103  |  22  ----------------------------<  131<H>  3.9   |  27  |  0.88    Ca    9.6      14 Apr 2018 08:11    TPro  7.0  /  Alb  3.2<L>  /  TBili  0.4  /  DBili  x   /  AST  24  /  ALT  17  /  AlkPhos  78  04-14                              13.8   14.95 )-----------( 492      ( 14 Apr 2018 08:11 )             41.2       Radiology:  - CT Head:4/14/18  < from: CT Brain Stroke Protocol (04.14.18 @ 08:02) >  Impression:    Age related involutional and microvascular ischemic changes, without   evidence of intracranial hemorrhage, mass effect or midline shift.     CTA carotids/Brain  < from: CT Angio Head w/ IV Cont (04.14.18 @ 10:09) >  IMPRESSION:          1.   Right carotid system:  No hemodynamically significant stenosis.          2.   Left carotid system:  moderate calcified plaque resulting in a   high-grade (80-90%) stenosis at the origin of the LEFT internal carotid   artery and mild stenosis at the origin of the LEFT external carotid   artery.         3.   Intracranial circulation: Occlusion with cut off of the mid LEFT   M1 segment with a 1 cm thrombus. There is faint flow in distal LEFT MCA   branches.        4.   Brain:  No significant lesion identified.      - MRI brain 4/14/18    < from: MR Head No Cont (04.14.18 @ 12:13) >  IMPRESSION: Acute small left basal ganglia/corona radiata infarct.   Findings discussed with the referring team by Dr. Pederson.              Assessment and Recommendation:   · Assessment		  86 y/o F p/w RLE and RUE weakness. Pt states she awoke this morning (went to bed around 10pm) and could not move her arm or leg which prompted her to have EMS bring her to the ER CT/CTA +ve for acute throbus in left MCA ED Physician Dr. Richardson spoke with Dr. Chase from stroke center he states pt is not a candidate for thrombectomy tba to [facility].     Acute Left CVA with Rt Hemiparesis Outside the window for TPA  With New onset AF.  Acute Infarct on MRI scan in white matter, no cortical infarct noted despite M1 thrombus  REc admit to telemetry/ cardiac admission with close neuro checks.  Convert to sinus rhythm  with medical management.  ASa/ plavix/ statin.  Hold AC for 24- 48 hrs. if start too early might convert into hemorrhagic CVA with heparin.  Speech/swallow evaluation.  PT/ OT.        Will discuss with Dr. Dsouza whether symptoms related to basal ganglia infarct and not to cortical reason implying high grade L ICA stenosis seen on CTA not the reason for her symptoms    will obtain carotid duplex

## 2018-04-15 NOTE — PROGRESS NOTE ADULT - ASSESSMENT
Assessment and Recommendation:   · Assessment		  86 y/o F p/w RLE and RUE weakness. Pt states she awoke this morning (went to bed around 10pm) and could not move her arm or leg which prompted her to have EMS bring her to the ER CT/CTA +ve for acute throbus in left MCA ED Physician Dr. Richardson spoke with Dr. Chase from stroke center he states pt is not a candidate for thrombectomy tba to [facility].     Acute Left CVA with Rt Hemiparesis Outside the window for TPA  With New onset AF.  Acute Infarct on MRI scan.  REc admit to telemetry/ cardiac admission with close neuro checks.  Convert to sinus rhythm  with medical management.  ASa/ plavix/ statin.  If Pt remains stable today can start low dose coumadin tonight .  F/u Ct brain in 48 hrs unless any new neurological c/o.  Carotid dopplers.  TTE/ NAN .  Will f/u.

## 2018-04-15 NOTE — PHYSICAL THERAPY INITIAL EVALUATION ADULT - ACTIVE RANGE OF MOTION EXAMINATION, REHAB EVAL
bilateral upper extremity Active ROM was WFL (within functional limits)/Limited passive and active  L elbow extension and R knee flexion. Charcot remigio tooth b/l feet/bilateral  lower extremity Active ROM was WFL (within functional limits)

## 2018-04-15 NOTE — PROGRESS NOTE ADULT - SUBJECTIVE AND OBJECTIVE BOX
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 Patient states at roughly 4:30 AM on day of admission as she was getting out of bed to go to bathroom, getting dizzy and falling to ground. She reports no head trauma during the event. She states she was on floor for 2 hours, and had difficulty getting up to her feet.   A phone was noted nearby and she called 911. She notes during her time on the floor having noted right arm weakness  and leg weakness that was intermittent but progressively worsening. She notes having falls in the past due to "2 bum knees" and having difficulty getting up due to her joint issues. In ED, it was noted that patient had atrial fibrillation rhythm and CTA head demonstrated occlusion with cut off of the mid left M1 segment with a 1 cm thrombus and high grade stenosis of left carotid artery. Dr. Cook/Hugh Stroke physician was called by ED attending for probable neurovascular intervention but was deemed to be not a candidate by Dr. Cook due to age, lesion location and NIHSS score. She is presently laying in bed, resting comfortably, concerned regarding her arm and leg weakness but otherwise reports no dizziness, headache, vision changes. (2018 14:51)      #Review of system- rest of review of systems are negative except as mentioned in HPI    PHYSICAL EXAM:    Vital Signs Last 24 Hrs  T(C): 36.4 (15 Apr 2018 11:48), Max: 36.7 (2018 14:57)  T(F): 97.6 (15 Apr 2018 11:48), Max: 98 (2018 14:57)  HR: 66 (15 Apr 2018 11:48) (66 - 93)  BP: 138/53 (15 Apr 2018 11:48) (104/60 - 144/83)  BP(mean): 86 (15 Apr 2018 05:13) (86 - 86)  RR: 18 (15 Apr 2018 11:48) (18 - 98)  SpO2: 98% (15 Apr 2018 11:48) (98% - 99%)    GENERAL: comfortable   HEAD:  Atraumatic, Normocephalic  EYES: EOMI, PERRLA, conjunctiva and sclera clear  HEENT: Moist mucous membranes  NECK: Supple, No JVD  NERVOUS SYSTEM:  Alert & Oriented X3, weakness noted on right side   CHEST/LUNG: Clear to auscultation bilaterally; No rales, rhonchi, wheezing, or rubs  HEART:S1S2 normal  ABDOMEN: Soft, Nontender, Nondistended; Bowel sounds present  GENITOURINARY- Voiding, no palpable bladder  EXTREMITIES:  2+ Peripheral Pulses, No clubbing, cyanosis, or edema  MUSCULOSKELETAL No muscle tenderness, Muscle tone normal,  SKIN-no rash, no lesion  PSYCH- Mood stable  LYMPH Node- No palpable lymph node    LABS:                        13.8   14.95 )-----------( 492      ( 2018 08:11 )             41.2     04-15    140  |  105  |  20  ----------------------------<  101<H>  3.9   |  26  |  0.68    Ca    9.0      15 Apr 2018 07:20    TPro  7.0  /  Alb  3.2<L>  /  TBili  0.4  /  DBili  x   /  AST  24  /  ALT  17  /  AlkPhos  78  04-14    PT/INR - ( 15 Apr 2018 07:20 )   PT: 12.1 sec;   INR: 1.12 ratio         PTT - ( 15 Apr 2018 07:20 )  PTT:28.1 sec  Urinalysis Basic - ( 2018 08:11 )    Color: Yellow / Appearance: Clear / S.010 / pH: x  Gluc: x / Ketone: Negative  / Bili: Negative / Urobili: Negative mg/dL   Blood: x / Protein: 30 mg/dL / Nitrite: Negative   Leuk Esterase: Trace / RBC: 0-2 /HPF / WBC 0-2   Sq Epi: x / Non Sq Epi: Few / Bacteria: Occasional        CAPILLARY BLOOD GLUCOSE          CARDIAC MARKERS ( 2018 08:11 )  <0.015 ng/mL / x     / 58 U/L / x     / x            Standing medicine  aspirin enteric coated 81 milliGRAM(s) Oral daily  atorvastatin 80 milliGRAM(s) Oral at bedtime  heparin  Injectable 5000 Unit(s) SubCutaneous every 12 hours  levothyroxine 125 MICROGram(s) Oral daily  metoprolol tartrate 25 milliGRAM(s) Oral two times a day  warfarin 2.5 milliGRAM(s) Oral daily

## 2018-04-15 NOTE — PHYSICAL THERAPY INITIAL EVALUATION ADULT - ADDITIONAL COMMENTS
As per pt., pt. resides alone in a ranch house w/ 1 [address]. There is a stair lift to basement. Pt. reports ambulating independently at home w/out an AD and has not worn heel lift for RLE in years. Pt. has a RW at home.

## 2018-04-16 LAB
APTT BLD: 187 SEC — CRITICAL HIGH (ref 27.5–37.4)
HCT VFR BLD CALC: 37.4 % — SIGNIFICANT CHANGE UP (ref 34.5–45)
HGB BLD-MCNC: 12.8 G/DL — SIGNIFICANT CHANGE UP (ref 11.5–15.5)
INR BLD: 1.13 RATIO — SIGNIFICANT CHANGE UP (ref 0.88–1.16)
MCHC RBC-ENTMCNC: 30.2 PG — SIGNIFICANT CHANGE UP (ref 27–34)
MCHC RBC-ENTMCNC: 34.2 GM/DL — SIGNIFICANT CHANGE UP (ref 32–36)
MCV RBC AUTO: 88.2 FL — SIGNIFICANT CHANGE UP (ref 80–100)
NRBC # BLD: 0 /100 WBCS — SIGNIFICANT CHANGE UP (ref 0–0)
PLATELET # BLD AUTO: 441 K/UL — HIGH (ref 150–400)
PROTHROM AB SERPL-ACNC: 12.2 SEC — SIGNIFICANT CHANGE UP (ref 9.8–12.7)
RBC # BLD: 4.24 M/UL — SIGNIFICANT CHANGE UP (ref 3.8–5.2)
RBC # FLD: 12.8 % — SIGNIFICANT CHANGE UP (ref 10.3–14.5)
WBC # BLD: 11.29 K/UL — HIGH (ref 3.8–10.5)
WBC # FLD AUTO: 11.29 K/UL — HIGH (ref 3.8–10.5)

## 2018-04-16 PROCEDURE — 99233 SBSQ HOSP IP/OBS HIGH 50: CPT

## 2018-04-16 PROCEDURE — 93880 EXTRACRANIAL BILAT STUDY: CPT | Mod: 26

## 2018-04-16 PROCEDURE — 70450 CT HEAD/BRAIN W/O DYE: CPT | Mod: 26

## 2018-04-16 PROCEDURE — 93306 TTE W/DOPPLER COMPLETE: CPT | Mod: 26

## 2018-04-16 RX ORDER — HEPARIN SODIUM 5000 [USP'U]/ML
2000 INJECTION INTRAVENOUS; SUBCUTANEOUS EVERY 6 HOURS
Qty: 0 | Refills: 0 | Status: DISCONTINUED | OUTPATIENT
Start: 2018-04-16 | End: 2018-04-19

## 2018-04-16 RX ORDER — HEPARIN SODIUM 5000 [USP'U]/ML
4000 INJECTION INTRAVENOUS; SUBCUTANEOUS ONCE
Qty: 0 | Refills: 0 | Status: COMPLETED | OUTPATIENT
Start: 2018-04-16 | End: 2018-04-16

## 2018-04-16 RX ORDER — LEVOTHYROXINE SODIUM 125 MCG
100 TABLET ORAL DAILY
Qty: 0 | Refills: 0 | Status: DISCONTINUED | OUTPATIENT
Start: 2018-04-16 | End: 2018-04-30

## 2018-04-16 RX ORDER — WARFARIN SODIUM 2.5 MG/1
5 TABLET ORAL DAILY
Qty: 0 | Refills: 0 | Status: DISCONTINUED | OUTPATIENT
Start: 2018-04-16 | End: 2018-04-17

## 2018-04-16 RX ORDER — HEPARIN SODIUM 5000 [USP'U]/ML
4000 INJECTION INTRAVENOUS; SUBCUTANEOUS EVERY 6 HOURS
Qty: 0 | Refills: 0 | Status: DISCONTINUED | OUTPATIENT
Start: 2018-04-16 | End: 2018-04-19

## 2018-04-16 RX ORDER — HEPARIN SODIUM 5000 [USP'U]/ML
INJECTION INTRAVENOUS; SUBCUTANEOUS
Qty: 25000 | Refills: 0 | Status: DISCONTINUED | OUTPATIENT
Start: 2018-04-16 | End: 2018-04-19

## 2018-04-16 RX ORDER — HEPARIN SODIUM 5000 [USP'U]/ML
2000 INJECTION INTRAVENOUS; SUBCUTANEOUS
Qty: 0 | Refills: 0 | Status: DISCONTINUED | OUTPATIENT
Start: 2018-04-16 | End: 2018-04-16

## 2018-04-16 RX ADMIN — HEPARIN SODIUM 900 UNIT(S)/HR: 5000 INJECTION INTRAVENOUS; SUBCUTANEOUS at 13:04

## 2018-04-16 RX ADMIN — Medication 25 MILLIGRAM(S): at 17:46

## 2018-04-16 RX ADMIN — ATORVASTATIN CALCIUM 80 MILLIGRAM(S): 80 TABLET, FILM COATED ORAL at 22:18

## 2018-04-16 RX ADMIN — HEPARIN SODIUM 5000 UNIT(S): 5000 INJECTION INTRAVENOUS; SUBCUTANEOUS at 05:17

## 2018-04-16 RX ADMIN — HEPARIN SODIUM 4000 UNIT(S): 5000 INJECTION INTRAVENOUS; SUBCUTANEOUS at 13:07

## 2018-04-16 RX ADMIN — Medication 81 MILLIGRAM(S): at 13:04

## 2018-04-16 RX ADMIN — HEPARIN SODIUM 0 UNIT(S)/HR: 5000 INJECTION INTRAVENOUS; SUBCUTANEOUS at 20:36

## 2018-04-16 RX ADMIN — Medication 125 MICROGRAM(S): at 05:17

## 2018-04-16 NOTE — SWALLOW BEDSIDE ASSESSMENT ADULT - SWALLOW EVAL: CRITERIA FOR SKILLED INTERVENTION MET
no problems identified which require skilled intervention/No overt need for speech or swallowing therapy. Thus, WILL NOT ACTIVELY FOLLOW. RECONSULT PRN.

## 2018-04-16 NOTE — PROGRESS NOTE ADULT - SUBJECTIVE AND OBJECTIVE BOX
· Requested by Name:		  · Date/Time:	14-Apr-2018 13:28	  · Reason for Referral/Consultation:	CVA with Rt side weakness	      · Subjective and Objective: 	  Patient is a 87y old  Female who presents with a chief complaint of Rt side weakness woke up with sx this morning.    HPI:  She is 86 Yo Female brought to ER with woke up this morning with Rt side arm and leg weakness went to bed last night at 10 pm. Could not move Rt arm or leg well with difficulty with her speech noted in ED. Not a TPA candidate but stat CT/ cTA performed noted Thrombus in left   MCA and ED physician on called  [facility] stroke team spoke with Dr. Cook and decided not a candidate for Thrombectomy and rec admission at  and further rx. Pt denies of any prior neurological sx and headaches, chest pain, SOB or palpitations. H/o Hypothyroidism takes medication. Lives home alone. Being followed by Dr. Blackburn but doesn't recall when she was seen last.     4/15/18 : Pt feeling better Speech improved and Rt UE weakness improved still feels weak she can not use as well as she wants. RT LE weakness persisting. NO Headaches, dizziness. Appreciated Cardiology consult and Echo and vascular surgery consults pending.     4/16/18 : Pt  feeling better with her Rt UE but Rt Le weakness unchanged. No new C/o Lot of questions about her BP and cardiac meds. As per PT refused PT yesterday. Started on low dose Coumadin. No other c/o. seen by Vascular surgery . Waiting for carotid dopplers.    PAST MEDICAL & SURGICAL HISTORY:  Hypothyroidism  Cataract surgery    MEDICATIONS  (STANDING):  aspirin enteric coated 81 milliGRAM(s) Oral daily  atorvastatin 80 milliGRAM(s) Oral at bedtime  heparin  Injectable 5000 Unit(s) SubCutaneous every 12 hours  levothyroxine 125 MICROGram(s) Oral daily  metoprolol tartrate 25 milliGRAM(s) Oral two times a day  warfarin 2.5 milliGRAM(s) Oral daily      Vital Signs Last 24 Hrs  T(C): 36.6 (16 Apr 2018 05:21), Max: 36.7 (15 Apr 2018 22:15)  T(F): 97.9 (16 Apr 2018 05:21), Max: 98.1 (15 Apr 2018 22:15)  HR: 73 (16 Apr 2018 05:21) (66 - 95)  BP: 129/94 (16 Apr 2018 05:21) (112/45 - 138/53)  BP(mean): 99 (16 Apr 2018 05:21) (99 - 99)  RR: 16 (15 Apr 2018 22:15) (16 - 18)  SpO2: 99% (16 Apr 2018 05:21) (98% - 99%)     Constitutional: awake and alert,  dysarthria improved.  HEENT: PERRLA, EOMI,   Neck: Supple.  Respiratory: Breath sounds are clear bilaterally  Cardiovascular: S1 and S2,  irregular rhythm  Gastrointestinal: soft, nontender  Extremities:  no edema  Vascular:  Carotid Bruit - no  Musculoskeletal: no joint swelling/tenderness.  Skin: No rashes    Neurological exam:  HF: A x O x 3. Appropriately interactive, normal affect. Speech dysarthria improved.  CN: DIVYA, EOMI, VF, mild Rt Facial  droop, weakness improved,  gag intact.   Motor: Rt side drift with weakness Rt UE 4/5 Rt LE 4/5 left side 5/5   Sens: Intact to light touch   Reflexes:  +2 Brisk on Rt > than left Plantars Up going on Rt down on left  Coord:  can not test on rt    Gait/Balance: Cannot test    04-15    140  |  105  |  20  ----------------------------<  101<H>  3.9   |  26  |  0.68    Ca    9.0      15 Apr 2018 07:20      04-15 AnqwfsmvpmI1O 5.3  04-14 HaczbcooxmI2J 5.2    04-15 Chol 191  HDL 47 Trig 93    Radiology report:  - CT Head 4/14/18  < from: CT Brain Stroke Protocol (04.14.18 @ 08:02) >  Impression:    Age related involutional and microvascular ischemic changes, without   evidence of intracranial hemorrhage, mass effect or midline shift.     CTA carotids/Brain  < from: CT Angio Head w/ IV Cont (04.14.18 @ 10:09) >  IMPRESSION:          1.   Right carotid system:  No hemodynamically significant stenosis.        2.   Left carotid system:  moderate calcified plaque resulting in a   high-grade (80-90%) stenosis at the origin of the LEFT internal carotid   artery and mild stenosis at the origin of the LEFT external carotid   artery.         3.   Intracranial circulation: Occlusion with cut off of the mid LEFT   M1 segment with a 1 cm thrombus. There is faint flow in distal LEFT MCA   branches.        4.   Brain:  No significant lesion identified.      - MRI brain 4/14/18    < from: MR Head No Cont (04.14.18 @ 12:13) >  IMPRESSION: Acute small left basal ganglia/corona radiata infarct.   Findings discussed with the referring team by Dr. Pederson.

## 2018-04-16 NOTE — PROGRESS NOTE ADULT - SUBJECTIVE AND OBJECTIVE BOX
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 Patient states at roughly 4:30 AM on day of admission as she was getting out of bed to go to bathroom, getting dizzy and falling to ground. She reports no head trauma during the event. She states she was on floor for 2 hours, and had difficulty getting up to her feet.   A phone was noted nearby and she called 911. She notes during her time on the floor having noted right arm weakness  and leg weakness that was intermittent but progressively worsening. She notes having falls in the past due to "2 bum knees" and having difficulty getting up due to her joint issues. In ED, it was noted that patient had atrial fibrillation rhythm and CTA head demonstrated occlusion with cut off of the mid left M1 segment with a 1 cm thrombus and high grade stenosis of left carotid artery. Dr. Cook/Hugh Stroke physician was called by ED attending for probable neurovascular intervention but was deemed to be not a candidate by Dr. Cook due to age, lesion location and NIHSS score. She is presently laying in bed, resting comfortably, concerned regarding her arm and leg weakness but otherwise reports no dizziness, headache, vision changes. (14 Apr 2018 14:51)      #Review of system- rest of review of systems are negative except as mentioned in HPI    PHYSICAL EXAM:    Vital Signs Last 24 Hrs  T(C): 36.6 (16 Apr 2018 05:21), Max: 36.7 (15 Apr 2018 22:15)  T(F): 97.9 (16 Apr 2018 05:21), Max: 98.1 (15 Apr 2018 22:15)  HR: 73 (16 Apr 2018 05:21) (73 - 95)  BP: 129/94 (16 Apr 2018 05:21) (112/45 - 129/94)  BP(mean): 99 (16 Apr 2018 05:21) (99 - 99)  RR: 16 (15 Apr 2018 22:15) (16 - 18)  SpO2: 99% (16 Apr 2018 05:21) (98% - 99%)    GENERAL: comfortable   HEAD:  Atraumatic, Normocephalic  EYES: EOMI, PERRLA, conjunctiva and sclera clear  HEENT: Moist mucous membranes  NECK: Supple, No JVD  NERVOUS SYSTEM:  Alert & Oriented X3, weakness noted on right side   CHEST/LUNG: Clear to auscultation bilaterally; No rales, rhonchi, wheezing, or rubs  HEART:S1S2 normal  ABDOMEN: Soft, Nontender, Nondistended; Bowel sounds present  GENITOURINARY- Voiding, no palpable bladder  EXTREMITIES:  2+ Peripheral Pulses, No clubbing, cyanosis, or edema  MUSCULOSKELETAL No muscle tenderness, Muscle tone normal,  SKIN-no rash, no lesion  PSYCH- Mood stable  LYMPH Node- No palpable lymph node      04-15    140  |  105  |  20  ----------------------------<  101<H>  3.9   |  26  |  0.68    Ca    9.0      15 Apr 2018 07:20          PT/INR - ( 15 Apr 2018 07:20 )   PT: 12.1 sec;   INR: 1.12 ratio         PTT - ( 15 Apr 2018 07:20 )  PTT:28.1 sec      PT/INR - ( 15 Apr 2018 07:20 )   PT: 12.1 sec;   INR: 1.12 ratio         PTT - ( 15 Apr 2018 07:20 )  PTT:28.1 sec  CAPILLARY BLOOD GLUCOSE          MEDICATIONS  (STANDING):  aspirin enteric coated 81 milliGRAM(s) Oral daily  atorvastatin 80 milliGRAM(s) Oral at bedtime  heparin  Infusion.  Unit(s)/Hr (9 mL/Hr) IV Continuous <Continuous>  levothyroxine 100 MICROGram(s) Oral daily  metoprolol tartrate 25 milliGRAM(s) Oral two times a day  warfarin 5 milliGRAM(s) Oral daily    MEDICATIONS  (PRN):  heparin  Injectable 4000 Unit(s) IV Push every 6 hours PRN For aPTT less than 40  heparin  Injectable 2000 Unit(s) IV Push every 6 hours PRN For aPTT between 40 - 57

## 2018-04-16 NOTE — PROVIDER CONTACT NOTE (CHANGE IN STATUS NOTIFICATION) - BACKGROUND
Pt. admitted on the 14th positive for a CVA and a new Atrial flutter/fib rhythm.  Pt. has a history of hypertension and osteomyelitis.

## 2018-04-16 NOTE — SWALLOW BEDSIDE ASSESSMENT ADULT - SWALLOW EVAL: RECOMMENDED DIET
SUGGEST A REGULAR TEXTURE DIET WITH THIN LIQUID CONSISTENCIES AS PT APPEARED TO TOLERATE SAME FROM AN OROPHARYNGEAL SWALLOWING STANCE ON CLINICAL EXAM.

## 2018-04-16 NOTE — SWALLOW BEDSIDE ASSESSMENT ADULT - SWALLOW EVAL: DIAGNOSIS
1) The pt demonstrates Oropharyngeal Swallowing integrity which subjectively appeared to be stable and within functional parameters for age. No behavioral aspiration signs were exhibited on exam.   2) Pt was anxious and irritable but alert as well as interactive. She was able to verbalize in conversation via intelligible, fluent, linguistically intact, contextually appropriate utterances. Her underlying motor speech and linguistic abilities were WFL on exam. At reported communicative baseline.

## 2018-04-16 NOTE — SWALLOW BEDSIDE ASSESSMENT ADULT - DIET PRIOR TO ADMI
The pt was reportedly on a regular texture diet with thin liquids PTH. The patient was reportedly on a regular texture diet with thin liquids PTH.

## 2018-04-16 NOTE — PROGRESS NOTE ADULT - ASSESSMENT
Assessment and Recommendation:   · Assessment		  88 y/o F p/w RLE and RUE weakness. Pt states she awoke this morning (went to bed around 10pm) and could not move her arm or leg which prompted her to have EMS bring her to the ER CT/CTA +ve for acute throbus in left MCA ED Physician Dr. Richardson spoke with Dr. Chase from stroke center he states pt is not a candidate for thrombectomy tba to [facility].     Acute Left CVA with Rt Hemiparesis Outside the window for TPA  With New onset AF.  Acute Infarct on MRI scan.  REc admit to telemetry/ cardiac admission with close neuro checks.  Convert to sinus rhythm  with medical management.  ASa/ plavix/ statin.  If Pt remains stable today can start low dose coumadin tonight .  Once PT/INR reaches therapeutic range change asa to 81 mg/plavix  .   F/u Ct brain in 48 hrs unless any new neurological c/o.  Carotid dopplers, f/u with results.  TTE/ NAN f/u with results.  Discussed with Pt, .

## 2018-04-16 NOTE — SWALLOW BEDSIDE ASSESSMENT ADULT - COMMENTS
The pt was admitted to  with RUE/RLE weakness and slurred speech which are improving. Hospital course is notable for finding of acute CVA/thrombus in left MCA region.  Pt is out of TPA window. This profile is superimposed upon a prior history of HTN, hypothyroidism, past cataracts sx, prior OM(NOS), and previous Scarlet Fever.

## 2018-04-16 NOTE — PROVIDER CONTACT NOTE (CHANGE IN STATUS NOTIFICATION) - ASSESSMENT
Heart rate of 95, BP of 112/45, Temp of 98.1, RR of 18, O2 Sat of 98% on room air.  Dr. Kayserian came and spoke to the patient for an extended time.  No new orders.  Will continue to monitor patient.

## 2018-04-16 NOTE — PROGRESS NOTE ADULT - SUBJECTIVE AND OBJECTIVE BOX
Patient is a 87y old  Female who presents with a chief complaint of fall and weakness (14 Apr 2018 14:51)  She is 88 Yo Female brought to ER with woke up this morning with Rt side arm and leg weakness went to bed last night at 10 pm. Could not move Rt arm or leg well with difficulty with her speech noted in ED. Not a TPA candidate but stat CT/ cTA performed noted Thrombus in left   MCA and ED physician on called  [facility] stroke team spoke with Dr. Cook and decided not a candidate for Thrombectomy and rec admission at  and further rx. Pt denies of any prior neurological sx and headaches, chest pain, SOB or palpitations. H/o Hypothyroidism takes medication. Lives home alone. Being followed by Dr. Blackburn but doesn't recall when she was seen last.     Examined at bed side, feels better, denies angina, orthopnea, PND, leg swelling, palpitations, syncope. Has no h/o cardiac arrhythmia or CAD.    4/15 - no new complaints. Tele reviewed, AF rate controlled. Agree with BB for rate control. Stable from CV stand point.   Cont current regimen. Start AC when safe from neuro standpoint. Pending vascular surgery consult re: SID. Agree with high intensity statin and ASA. ECHO pending.     4/16-  having intermittent weak sensation in the right hand.   tolerating coumadin. Not on heparin and not therapeutic with the coumadin.   remains in a rate controlled Atrial Fibrillation.  Usually followed for cardiology by Dr Bill at [place].     SUBJECTIVE:     Allergies    No Known Allergies    Intolerances        MEDICATIONS  (STANDING):  aspirin enteric coated 81 milliGRAM(s) Oral daily  atorvastatin 80 milliGRAM(s) Oral at bedtime  heparin  Infusion.  Unit(s)/Hr (9 mL/Hr) IV Continuous <Continuous>  heparin  Injectable 4000 Unit(s) IV Push once  levothyroxine 125 MICROGram(s) Oral daily  metoprolol tartrate 25 milliGRAM(s) Oral two times a day  warfarin 2.5 milliGRAM(s) Oral daily    MEDICATIONS  (PRN):  heparin  Injectable 4000 Unit(s) IV Push every 6 hours PRN For aPTT less than 40  heparin  Injectable 2000 Unit(s) IV Push every 6 hours PRN For aPTT between 40 - 57    REVIEW OF SYSTEMS:    RESPIRATORY: No cough, wheezing, hemoptysis; No shortness of breath  CARDIOVASCULAR: No chest pain or palpitations  All other review of systems is negative unless indicated above      PHYSICAL EXAM:  Daily     Daily   Vital Signs Last 24 Hrs  T(C): 36.6 (16 Apr 2018 05:21), Max: 36.7 (15 Apr 2018 22:15)  T(F): 97.9 (16 Apr 2018 05:21), Max: 98.1 (15 Apr 2018 22:15)  HR: 73 (16 Apr 2018 05:21) (66 - 95)  BP: 129/94 (16 Apr 2018 05:21) (112/45 - 138/53)  BP(mean): 99 (16 Apr 2018 05:21) (99 - 99)  RR: 16 (15 Apr 2018 22:15) (16 - 18)  SpO2: 99% (16 Apr 2018 05:21) (98% - 99%)    Constitutional: NAD, awake and alert, well-developed  HEENT: PERR, EOMI, Normal Hearing, MMM  Neck: Soft and supple, No LAD, No JVD  Respiratory: Breath sounds are clear bilaterally, No wheezing, rales or rhonchi  Cardiovascular: S1 and S2, regular rate and rhythm, no Murmurs, gallops or rubs  Gastrointestinal: Bowel Sounds present, soft, nontender, nondistended, no guarding, no rebound  Extremities: No peripheral edema  Vascular: 2+ peripheral pulses  Neurological: A/O x 3, no focal deficits  Musculoskeletal: 5/5 strength b/l upper and lower extremities  Skin: No rashes    LABS: All Labs Reviewed:    04-15    140  |  105  |  20  ----------------------------<  101<H>  3.9   |  26  |  0.68    Ca    9.0      15 Apr 2018 07:20      PT/INR - ( 15 Apr 2018 07:20 )   PT: 12.1 sec;   INR: 1.12 ratio         PTT - ( 15 Apr 2018 07:20 )  PTT:28.1 sec          TELEMETRY/EKG:  rate controlled Afib    ECHO: pending    Radiology:  < from: CT Angio Head w/ IV Cont (04.14.18 @ 10:09) >  EXAM:  CT ANGIO BRAIN (W)AW IC                            PROCEDURE DATE:  04/14/2018          INTERPRETATION:      Examinations were performed on this patient:  1. CT Angiography of the carotid arteries with IV contrast   2. CT Angiography of the intracranial circulation with IV contrast  3. CT Head without contrast      CLINICAL INFORMATION:  Carotid stenosis. Intracranial stenosis/aneurysm.   TIA/stroke. Unable to move RIGHT arm    TECHNIQUE:   Preceding intravenous contrast contiguous axial4 mm   sections were obtained through the head. CT angiography images at .5 mm   thickness were acquired from the aortic arch to the vertex of the skull.     Images were acquired during rapid bolus intravenous administration of 90   mL of Omnipaque 350 contrast/ 10 mL discarded.  This data set was   reconstructed axial 1 mm sections. Post processing maximum intensity   projection angiographic reconstruction of images was performed.  This   data set was reconstructed and reviewed in 2 mm sagittal and coronal   reformatted images to evaluate carotid morphology and intracranial   vessels.   The magnitude of stenosis was determined using NASCET   criteria.   This scan was performed using automatic exposure control   (radiation dose reduction software) to obtain a diagnostic image quality   scan with patient dose as low as reasonably achievable.           FINDINGS:   No previous examinations are available for review.         The LEFT common carotid artery bifurcation demonstrates moderate   calcified plaque resulting in a high-grade (80-90%) stenosis at the   origin of the LEFT internal carotid artery and mild stenosis at the   origin of the LEFT external carotid artery. The RIGHT common carotid   artery bifurcation demonstrates mild calcified plaque without   hemodynamically significant stenosis of the RIGHT internal carotid artery.    The vertebral arteries are patent.         The intracranial circulation is significant for a cut off of the mid LEFT   M1 segment with a 1 cm thrombus.There is faint flow in distal LEFT MCA   branches. The RIGHT middle cerebral artery is patent. The anterior   cerebral arteries are patent. There is calcification of the cavernous   internal carotid arteries bilaterally resulting in a mild stenoses.    The brain demonstrates mild periventricular white matter ischemia.  No   acute cerebral cortical infarct is seen.  No intracranial hemorrhage is   found.  The ventricles, sulci and basal cisterns appear unremarkable.    The orbits are unremarkable. The paranasal sinuses are clear.  The nasal   cavity remains intact.  The nasopharynx is symmetric.  The central skull   base, petrous temporal bones and calvarium remain intact.    Small RIGHT pleural effusion.    IMPRESSION:          1.   Right carotid system:  No hemodynamically significant stenosis.          2.   Left carotid system:  moderate calcified plaque resulting in a   high-grade (80-90%) stenosis at the origin of the LEFT internal carotid   artery and mild stenosis at the origin of the LEFT external carotid   artery.         3.   Intracranial circulation: Occlusion with cut off of the mid LEFT   M1 segment with a 1 cm thrombus. There is faint flow in distal LEFT MCA   branches.        4.   Brain:  No significant lesion identified.      Critical value:  I discussed the finding of this report with Dr. Chapman at 10:20 AM on 4/14/2018.  Critical value policy of the   hospital was followed.  Read back and confirmation of receipt of this   communication was performed.  This verbal communication supplements the   text report of this document.    < end of copied text >

## 2018-04-16 NOTE — PROGRESS NOTE ADULT - ASSESSMENT
A/P    #Acute CVA-  ct asa/statin, echo to follow, ct tele   -increase coumadin tonight     #Afib-rate under control     #ICA stensosis- us study noted, vascular follow up requested     #PT, rehab placement     #Above discussed with pt and all questions have been answered

## 2018-04-16 NOTE — SWALLOW BEDSIDE ASSESSMENT ADULT - SLP GENERAL OBSERVATIONS
Pt was anxious and irritable but alert as well as interactive. She was able to verbalize in conversation via intelligible, fluent, linguistically intact, contextually appropriate utterances. Her underlying motor speech and linguistic abilities were WFL on exam. At reported communicative baseline.  Note that pt denied Dysphagia when asked.

## 2018-04-17 LAB
APTT BLD: 100.3 SEC — HIGH (ref 27.5–37.4)
APTT BLD: 136.2 SEC — CRITICAL HIGH (ref 27.5–37.4)
APTT BLD: 74.4 SEC — HIGH (ref 27.5–37.4)
HCT VFR BLD CALC: 38.5 % — SIGNIFICANT CHANGE UP (ref 34.5–45)
HGB BLD-MCNC: 12.7 G/DL — SIGNIFICANT CHANGE UP (ref 11.5–15.5)
INR BLD: 1.12 RATIO — SIGNIFICANT CHANGE UP (ref 0.88–1.16)
MCHC RBC-ENTMCNC: 29.5 PG — SIGNIFICANT CHANGE UP (ref 27–34)
MCHC RBC-ENTMCNC: 33 GM/DL — SIGNIFICANT CHANGE UP (ref 32–36)
MCV RBC AUTO: 89.5 FL — SIGNIFICANT CHANGE UP (ref 80–100)
NRBC # BLD: 0 /100 WBCS — SIGNIFICANT CHANGE UP (ref 0–0)
PLATELET # BLD AUTO: 443 K/UL — HIGH (ref 150–400)
PROTHROM AB SERPL-ACNC: 12.1 SEC — SIGNIFICANT CHANGE UP (ref 9.8–12.7)
RBC # BLD: 4.3 M/UL — SIGNIFICANT CHANGE UP (ref 3.8–5.2)
RBC # FLD: 13 % — SIGNIFICANT CHANGE UP (ref 10.3–14.5)
WBC # BLD: 11.07 K/UL — HIGH (ref 3.8–10.5)
WBC # FLD AUTO: 11.07 K/UL — HIGH (ref 3.8–10.5)

## 2018-04-17 PROCEDURE — 99233 SBSQ HOSP IP/OBS HIGH 50: CPT

## 2018-04-17 RX ORDER — WARFARIN SODIUM 2.5 MG/1
7.5 TABLET ORAL DAILY
Qty: 0 | Refills: 0 | Status: DISCONTINUED | OUTPATIENT
Start: 2018-04-17 | End: 2018-04-18

## 2018-04-17 RX ADMIN — HEPARIN SODIUM 800 UNIT(S)/HR: 5000 INJECTION INTRAVENOUS; SUBCUTANEOUS at 05:45

## 2018-04-17 RX ADMIN — HEPARIN SODIUM 700 UNIT(S)/HR: 5000 INJECTION INTRAVENOUS; SUBCUTANEOUS at 18:19

## 2018-04-17 RX ADMIN — Medication 100 MICROGRAM(S): at 05:50

## 2018-04-17 RX ADMIN — HEPARIN SODIUM 800 UNIT(S)/HR: 5000 INJECTION INTRAVENOUS; SUBCUTANEOUS at 04:48

## 2018-04-17 RX ADMIN — WARFARIN SODIUM 7.5 MILLIGRAM(S): 2.5 TABLET ORAL at 21:18

## 2018-04-17 RX ADMIN — HEPARIN SODIUM 700 UNIT(S)/HR: 5000 INJECTION INTRAVENOUS; SUBCUTANEOUS at 11:32

## 2018-04-17 RX ADMIN — Medication 81 MILLIGRAM(S): at 13:50

## 2018-04-17 RX ADMIN — Medication 25 MILLIGRAM(S): at 05:50

## 2018-04-17 RX ADMIN — ATORVASTATIN CALCIUM 80 MILLIGRAM(S): 80 TABLET, FILM COATED ORAL at 21:18

## 2018-04-17 RX ADMIN — Medication 25 MILLIGRAM(S): at 18:14

## 2018-04-17 NOTE — PROGRESS NOTE ADULT - SUBJECTIVE AND OBJECTIVE BOX
stable    carotid duplex noted    discussed with Dr. Dsouza re findings of MRI and clinical status, particularly that basal ganglia infarct would not be a source of aphasia, and intracranial M1 thrombus could be cardioembolic or carotid bifurcation in origin.  If cardioembolic, and L ICA stenosis critical, difficult to understand how significant thrombus could pass through carotid bifurcation.    Agree with anti coagulation for now    Pt plans to visit her cardiologist (Dr. Vinson) at [place] at some point after discharge who will also make recommendations re managment.    MEDICATIONS  (STANDING):  aspirin enteric coated 81 milliGRAM(s) Oral daily  atorvastatin 80 milliGRAM(s) Oral at bedtime  heparin  Infusion.  Unit(s)/Hr (9 mL/Hr) IV Continuous <Continuous>  levothyroxine 100 MICROGram(s) Oral daily  metoprolol tartrate 25 milliGRAM(s) Oral two times a day  warfarin 7.5 milliGRAM(s) Oral daily    MEDICATIONS  (PRN):  heparin  Injectable 4000 Unit(s) IV Push every 6 hours PRN For aPTT less than 40  heparin  Injectable 2000 Unit(s) IV Push every 6 hours PRN For aPTT between 40 - 57      Allergies    No Known Allergies    Intolerances        Flatus: [ ] YES [ ] NO             Bowel Movement: [ ] YES [ ] NO  Pain (0-10):            Pain Control Adequate: [ ] YES [ ] NO  Nausea: [ ] YES [ ] NO            Vomiting: [ ] YES [ ] NO  Diarrhea: [ ] YES [ ] NO         Constipation: [ ] YES [ ] NO     Chest Pain: [ ] YES [ ] NO    SOB:  [ ] YES [ ] NO    Vital Signs Last 24 Hrs  T(C): 36.2 (17 Apr 2018 10:55), Max: 36.5 (17 Apr 2018 04:43)  T(F): 97.1 (17 Apr 2018 10:55), Max: 97.7 (17 Apr 2018 04:43)  HR: 82 (17 Apr 2018 10:55) (74 - 96)  BP: 119/43 (17 Apr 2018 10:55) (119/43 - 156/62)  BP(mean): --  RR: 18 (17 Apr 2018 10:55) (17 - 18)  SpO2: 97% (17 Apr 2018 10:55) (97% - 99%)    I&O's Summary      Physical Exam:  General: NAD, resting comfortably  Pulmonary: normal resp effort, CTA-B  Cardiovascular: NSR  Abdominal: soft, NT/ND  Extremities: WWP, normal strength  Neuro: A/O x 3, CNs II-XII grossly intact, normal motor/sensation, no focal deficits  Pulses:   Right:                                                                          Left:  FEM [ ]2+ [ ]1+ [ ]doppler                                             FEM [ ]2+ [ ]1+ [ ]doppler    POP [ ]2+ [ ]1+ [ ]doppler                                             POP [ ]2+ [ ]1+ [ ]doppler    DP [ ]2+ [ ]1+ [ ]doppler                                                DP [ ]2+ [ ]1+ [ ]doppler  PT[ ]2+ [ ]1+ [ ]doppler                                                  PT [ ]2+ [ ]1+ [ ]doppler    LABS:                        12.7   11.07 )-----------( 443      ( 17 Apr 2018 05:31 )             38.5           PT/INR - ( 17 Apr 2018 05:31 )   PT: 12.1 sec;   INR: 1.12 ratio         PTT - ( 17 Apr 2018 10:31 )  PTT:100.3 sec      CAPILLARY BLOOD GLUCOSE          RADIOLOGY & ADDITIONAL TESTS:

## 2018-04-17 NOTE — PROGRESS NOTE ADULT - SUBJECTIVE AND OBJECTIVE BOX
Patient is a 87y old  Female who presents with a chief complaint of fall and weakness (14 Apr 2018 14:51)    She is 86 Yo Female brought to ER with woke up this morning with Rt side arm and leg weakness went to bed last night at 10 pm. Could not move Rt arm or leg well with difficulty with her speech noted in ED. Not a TPA candidate but stat CT/ cTA performed noted Thrombus in left   MCA and ED physician on called  [facility] stroke team spoke with Dr. Cook and decided not a candidate for Thrombectomy and rec admission at  and further rx. Pt denies of any prior neurological sx and headaches, chest pain, SOB or palpitations. H/o Hypothyroidism takes medication. Lives home alone. Being followed by Dr. Blackburn but doesn't recall when she was seen last.     Examined at bed side, feels better, denies angina, orthopnea, PND, leg swelling, palpitations, syncope. Has no h/o cardiac arrhythmia or CAD.    4/15 - no new complaints. Tele reviewed, AF rate controlled. Agree with BB for rate control. Stable from CV stand point.   Cont current regimen. Start AC when safe from neuro standpoint. Pending vascular surgery consult re: SID. Agree with high intensity statin and ASA. ECHO pending.     4/16-  having intermittent weak sensation in the right hand.   tolerating coumadin. Not on heparin and not therapeutic with the coumadin.   remains in a rate controlled Atrial Fibrillation.  Usually followed for cardiology by Dr Bill at [facility].     4/17-  on IV heparin and loading with coumadin.   rate controlled atrial fibrillation.   spoke to Dr Sanchez. CEA not indicated at this time.     Allergies    No Known Allergies    Intolerances        MEDICATIONS  (STANDING):  aspirin enteric coated 81 milliGRAM(s) Oral daily  atorvastatin 80 milliGRAM(s) Oral at bedtime  heparin  Infusion.  Unit(s)/Hr (9 mL/Hr) IV Continuous <Continuous>  levothyroxine 100 MICROGram(s) Oral daily  metoprolol tartrate 25 milliGRAM(s) Oral two times a day  warfarin 5 milliGRAM(s) Oral daily    MEDICATIONS  (PRN):  heparin  Injectable 4000 Unit(s) IV Push every 6 hours PRN For aPTT less than 40  heparin  Injectable 2000 Unit(s) IV Push every 6 hours PRN For aPTT between 40 - 57    REVIEW OF SYSTEMS:    RESPIRATORY: No cough, wheezing, hemoptysis; No shortness of breath  CARDIOVASCULAR: No chest pain or palpitations  All other review of systems is negative unless indicated above      PHYSICAL EXAM:  Daily     Daily   Vital Signs Last 24 Hrs  T(C): 36.5 (17 Apr 2018 04:43), Max: 36.5 (17 Apr 2018 04:43)  T(F): 97.7 (17 Apr 2018 04:43), Max: 97.7 (17 Apr 2018 04:43)  HR: 74 (17 Apr 2018 04:43) (74 - 96)  BP: 121/37 (17 Apr 2018 04:43) (121/37 - 156/62)  BP(mean): --  RR: 17 (16 Apr 2018 17:19) (17 - 17)  SpO2: 97% (17 Apr 2018 04:43) (97% - 99%)    Constitutional: NAD, awake and alert, well-developed  HEENT: PERR, EOMI, Normal Hearing, MMM  Neck: Soft and supple, No LAD, No JVD  Respiratory: Breath sounds are clear bilaterally, No wheezing, rales or rhonchi  Cardiovascular: S1 and S2, regular rate and rhythm, no Murmurs, gallops or rubs  Gastrointestinal: Bowel Sounds present, soft, nontender, nondistended, no guarding, no rebound  Extremities: No peripheral edema  Vascular: 2+ peripheral pulses  Neurological: A/O x 3, no focal deficits  Musculoskeletal: 5/5 strength b/l upper and lower extremities  Skin: No rashes    LABS: All Labs Reviewed:                        12.7   11.07 )-----------( 443      ( 17 Apr 2018 05:31 )             38.5           PT/INR - ( 17 Apr 2018 05:31 )   PT: 12.1 sec;   INR: 1.12 ratio         PTT - ( 17 Apr 2018 03:36 )  PTT:136.2 sec          TELEMETRY/EKG: rate controlled afib

## 2018-04-17 NOTE — PROGRESS NOTE ADULT - ASSESSMENT
A/P    #Acute CVA-  ct asa/statin, echo noted- sever AS and sever MS -out pt management   -ct coumadin and heparin, monitor inr closely     #Afib-rate under control     #ICA stensosis- Dr. Alexander follow up appreciated, out pt management as [facility]     #PT, rehab placement     #Above discussed with pt and all questions have been answered

## 2018-04-17 NOTE — PROGRESS NOTE ADULT - ASSESSMENT
Assessment and Recommendation:   · Assessment		  88 y/o F p/w RLE and RUE weakness. Pt states she awoke this morning (went to bed around 10pm) and could not move her arm or leg which prompted her to have EMS bring her to the ER CT/CTA +ve for acute throbus in left MCA ED Physician Dr. Richardson spoke with Dr. Chase from stroke center he states pt is not a candidate for thrombectomy tba to [facility].     Acute Left CVA with Rt Hemiparesis Outside the window for TPA  With New onset AF.  Acute Infarct on MRI scan.  REc admit to telemetry/ cardiac admission with close neuro checks.  Convert to sinus rhythm  with medical management.  On Ac currently.  If Pt remains stable today can start low dose coumadin tonight .  Once PT/INR reaches therapeutic range change asa to 81 mg/plavix  .   F/u Ct brain in 48 hrs unless any new neurological c/o.  With heparin added high risk of hemorrhagic tansformation rec f/u CT scan.  Carotid dopplers Bilat stenosis left > Rt.  TTE/ NAN f/u with results.  Discussed with Pt,  and .  Rehab placement when Pt/INr therapeutic.

## 2018-04-17 NOTE — PROGRESS NOTE ADULT - ASSESSMENT
88 y/o F p/w RLE and RUE weakness. Pt states she awoke this morning (went to bed around 10pm) and could not move her arm or leg which prompted her to have EMS bring her to the ER CT/CTA +ve for acute thrombus in left MCA ED Physician Dr. Richardson spoke with Dr. Chase from stroke center he states pt is not a candidate for thrombectomy tba to [facility]     * New onset AF, fairly rate controlled at the moment. CHADSVASC score 7 ( HTN, Age >75, Stroke, Vascular disease, female).  Needs AC when safe from neuro stand point, agree with UFH first and when stroke is stable then possibly with a NOAC.  IF needed for rate control, can start PO diltiazem if able to tolerate oral route, otherwise can use IV drip.  2d echo, tele.    * Left carotid artery stenosis, ipsilateral to stroke and > 70% stenosis, agree with obtaining opinion from  vascular surgery re: CEA vs PTA. Otherwise, cont antiplatelet, statin. This stroke could be embolic from AF but also artery to artery embolization is possible.     4/16-  concerned about her intermittent weakness in the right upper extremity. She is not therapeutic yet on coumadin.   will start IV Heparin while she reaches an adequate INR.  Needs a CEA but need to determine the correct timing. Will have to speak to Dr Sanchez.  physical therapy very important at this time.   baby aspirin 81 mg daily.   continue metoprolol for afib rate control.    4/17-  continue IV heparin while loading with coumadin.   recommend she consult with her cardiologist at [facility], as an outpatient, regarding need for left CEA.  physical therapy  rehab placement when INR is therapeutic.

## 2018-04-17 NOTE — PROGRESS NOTE ADULT - SUBJECTIVE AND OBJECTIVE BOX
· Reason for Referral/Consultation:	CVA with Rt side weakness	      · Subjective and Objective: 	  Patient is a 87y old  Female who presents with a chief complaint of Rt side weakness woke up with sx this morning.    HPI:  She is 88 Yo Female brought to ER with woke up this morning with Rt side arm and leg weakness went to bed last night at 10 pm. Could not move Rt arm or leg well with difficulty with her speech noted in ED. Not a TPA candidate but stat CT/ cTA performed noted Thrombus in left   MCA and ED physician on called  [facility] stroke team spoke with Dr. Cook and decided not a candidate for Thrombectomy and rec admission at  and further rx. Pt denies of any prior neurological sx and headaches, chest pain, SOB or palpitations. H/o Hypothyroidism takes medication. Lives home alone. Being followed by Dr. Blackburn but doesn't recall when she was seen last.     4/15/18 : Pt feeling better Speech improved and Rt UE weakness improved still feels weak she can not use as well as she wants. RT LE weakness persisting. NO Headaches, dizziness. Appreciated Cardiology consult and Echo and vascular surgery consults pending.     4/16/18 : Pt  feeling better with her Rt UE but Rt Le weakness unchanged. No new C/o Lot of questions about her BP and cardiac meds. As per PT refused PT yesterday. Started on low dose Coumadin. No other c/o. seen by Vascular surgery . Waiting for carotid dopplers.     4/17/18 : Pt on IV heparin with coumadin per cardiology. Offers no c/o. No Headache, no change in her weakness. Rt side weakness persisting.  Seen by Dr. Benjamin Simon Carotid stenosis      MEDICATIONS  (STANDING):  aspirin enteric coated 81 milliGRAM(s) Oral daily  atorvastatin 80 milliGRAM(s) Oral at bedtime  heparin  Infusion.  Unit(s)/Hr (9 mL/Hr) IV Continuous <Continuous>  levothyroxine 100 MICROGram(s) Oral daily  metoprolol tartrate 25 milliGRAM(s) Oral two times a day  warfarin 7.5 milliGRAM(s) Oral daily      Vital Signs Last 24 Hrs  T(C): 36.5 (17 Apr 2018 04:43), Max: 36.5 (17 Apr 2018 04:43)  T(F): 97.7 (17 Apr 2018 04:43), Max: 97.7 (17 Apr 2018 04:43)  HR: 74 (17 Apr 2018 04:43) (74 - 96)  BP: 121/37 (17 Apr 2018 04:43) (121/37 - 156/62)  BP(mean): --  RR: 17 (16 Apr 2018 17:19) (17 - 17)  SpO2: 97% (17 Apr 2018 04:43) (97% - 99%)      Constitutional: awake and alert,  dysarthria improved.  HEENT: PERRLA, EOMI,   Neck: Supple.  Respiratory: Breath sounds are clear bilaterally  Cardiovascular: S1 and S2,  irregular rhythm  Gastrointestinal: soft, nontender  Extremities:  no edema  Vascular:  Carotid Bruit - no  Musculoskeletal: no joint swelling/tenderness.  Skin: No rashes    Neurological exam:  HF: A x O x 3. Appropriately interactive, normal affect. Speech dysarthria improved.  CN: DIVYA, EOMI, VF, mild Rt Facial  droop, weakness improved,  gag intact.   Motor: Rt side drift with weakness Rt UE 4/5 Rt LE 4/5 left side 5/5   Sens: Intact to light touch   Reflexes:  +2 Brisk on Rt > than left Plantars Up going on Rt down on left  Coord:  can not test on rt    Gait/Balance: Cannot test                     12.7   11.07 )-----------( 443      ( 17 Apr 2018 05:31 )             38.5           04-15 HxxzebmovqS1C 5.3  04-14 DbgvoagosdW3P 5.2    04-15 Chol 191  HDL 47 Trig 93    Radiology report:  - CT Head  < from: CT Brain Stroke Protocol (04.14.18 @ 08:02) >  Impression:    Age related involutional and microvascular ischemic changes, without   evidence of intracranial hemorrhage, mass effect or midline shift.     CTA carotids/Brain  < from: CT Angio Head w/ IV Cont (04.14.18 @ 10:09) >  IMPRESSION:        1.   Right carotid system:  No hemodynamically significant stenosis.        2.   Left carotid system:  moderate calcified plaque resulting in a   high-grade (80-90%) stenosis at the origin of the LEFT internal carotid   artery and mild stenosis at the origin of the LEFT external carotid   artery.         3.   Intracranial circulation: Occlusion with cut off of the mid LEFT   M1 segment with a 1 cm thrombus. There is faint flow in distal LEFT MCA   branches.        4.   Brain:  No significant lesion identified.      - MRI brain 4/14/18    < from: MR Head No Cont (04.14.18 @ 12:13) >  IMPRESSION: Acute small left basal ganglia/corona radiata infarct.   Findings discussed with the referring team by Dr. Pederson.

## 2018-04-18 LAB
APTT BLD: 73.2 SEC — HIGH (ref 27.5–37.4)
HCT VFR BLD CALC: 36.1 % — SIGNIFICANT CHANGE UP (ref 34.5–45)
HGB BLD-MCNC: 12.3 G/DL — SIGNIFICANT CHANGE UP (ref 11.5–15.5)
INR BLD: 1.17 RATIO — HIGH (ref 0.88–1.16)
MCHC RBC-ENTMCNC: 30.3 PG — SIGNIFICANT CHANGE UP (ref 27–34)
MCHC RBC-ENTMCNC: 34.1 GM/DL — SIGNIFICANT CHANGE UP (ref 32–36)
MCV RBC AUTO: 88.9 FL — SIGNIFICANT CHANGE UP (ref 80–100)
NRBC # BLD: 0 /100 WBCS — SIGNIFICANT CHANGE UP (ref 0–0)
PLATELET # BLD AUTO: 415 K/UL — HIGH (ref 150–400)
PROTHROM AB SERPL-ACNC: 12.7 SEC — SIGNIFICANT CHANGE UP (ref 9.8–12.7)
RBC # BLD: 4.06 M/UL — SIGNIFICANT CHANGE UP (ref 3.8–5.2)
RBC # FLD: 12.9 % — SIGNIFICANT CHANGE UP (ref 10.3–14.5)
WBC # BLD: 14.16 K/UL — HIGH (ref 3.8–10.5)
WBC # FLD AUTO: 14.16 K/UL — HIGH (ref 3.8–10.5)

## 2018-04-18 PROCEDURE — 99233 SBSQ HOSP IP/OBS HIGH 50: CPT

## 2018-04-18 RX ORDER — POLYETHYLENE GLYCOL 3350 17 G/17G
17 POWDER, FOR SOLUTION ORAL DAILY
Qty: 0 | Refills: 0 | Status: DISCONTINUED | OUTPATIENT
Start: 2018-04-18 | End: 2018-04-21

## 2018-04-18 RX ORDER — WARFARIN SODIUM 2.5 MG/1
10 TABLET ORAL DAILY
Qty: 0 | Refills: 0 | Status: DISCONTINUED | OUTPATIENT
Start: 2018-04-18 | End: 2018-04-19

## 2018-04-18 RX ORDER — WARFARIN SODIUM 2.5 MG/1
10 TABLET ORAL DAILY
Qty: 0 | Refills: 0 | Status: DISCONTINUED | OUTPATIENT
Start: 2018-04-18 | End: 2018-04-18

## 2018-04-18 RX ADMIN — Medication 25 MILLIGRAM(S): at 05:48

## 2018-04-18 RX ADMIN — WARFARIN SODIUM 10 MILLIGRAM(S): 2.5 TABLET ORAL at 21:01

## 2018-04-18 RX ADMIN — Medication 100 MICROGRAM(S): at 05:48

## 2018-04-18 RX ADMIN — Medication 81 MILLIGRAM(S): at 13:13

## 2018-04-18 RX ADMIN — HEPARIN SODIUM 700 UNIT(S)/HR: 5000 INJECTION INTRAVENOUS; SUBCUTANEOUS at 00:54

## 2018-04-18 RX ADMIN — ATORVASTATIN CALCIUM 80 MILLIGRAM(S): 80 TABLET, FILM COATED ORAL at 21:01

## 2018-04-18 NOTE — PROGRESS NOTE ADULT - ASSESSMENT
A/P    #Acute CVA-  ct asa/statin, echo noted- sever AS and sever MS -out pt management   -ct coumadin and heparin, monitor inr closely   -once therapeutic inr then d/c aspirin     #Afib-rate under control     #ICA stensosis- Dr. Alexander follow up appreciated, out pt management as [facility]     #PT, rehab placement     #Above discussed with pt and all questions have been answered

## 2018-04-18 NOTE — PROGRESS NOTE ADULT - ASSESSMENT
88 y/o F p/w RLE and RUE weakness. Pt states she awoke this morning (went to bed around 10pm) and could not move her arm or leg which prompted her to have EMS bring her to the ER CT/CTA +ve for acute thrombus in left MCA ED Physician Dr. Richardson spoke with Dr. Chase from stroke center he states pt is not a candidate for thrombectomy tba to [facility]     * New onset AF, fairly rate controlled at the moment. CHADSVASC score 7 ( HTN, Age >75, Stroke, Vascular disease, female).  Needs AC when safe from neuro stand point, agree with UFH first and when stroke is stable then possibly with a NOAC.  IF needed for rate control, can start PO diltiazem if able to tolerate oral route, otherwise can use IV drip.  2d echo, tele.    * Left carotid artery stenosis, ipsilateral to stroke and > 70% stenosis, agree with obtaining opinion from  vascular surgery re: CEA vs PTA. Otherwise, cont antiplatelet, statin. This stroke could be embolic from AF but also artery to artery embolization is possible.     4/16-  concerned about her intermittent weakness in the right upper extremity. She is not therapeutic yet on coumadin.   will start IV Heparin while she reaches an adequate INR.  Needs a CEA but need to determine the correct timing. Will have to speak to Dr Sanchez.  physical therapy very important at this time.   baby aspirin 81 mg daily.   continue metoprolol for afib rate control.    4/17-  continue IV heparin while loading with coumadin.   recommend she consult with her cardiologist at [facility], as an outpatient, regarding need for left CEA.  physical therapy  rehab placement when INR is therapeutic.    4/18-  increase coumadin dose to 10 mg daily.   continue IV heparin.   physical therapy.  Miralax daily for bowels.

## 2018-04-18 NOTE — PROGRESS NOTE ADULT - SUBJECTIVE AND OBJECTIVE BOX
· Subjective and Objective: 	  Patient is a 87y old  Female who presents with a chief complaint of Rt side weakness woke up with sx this morning.    HPI:  She is 86 Yo Female brought to ER with woke up this morning with Rt side arm and leg weakness went to bed last night at 10 pm. Could not move Rt arm or leg well with difficulty with her speech noted in ED. Not a TPA candidate but stat CT/ cTA performed noted Thrombus in left   MCA and ED physician on called  [facility] stroke team spoke with Dr. Cook and decided not a candidate for Thrombectomy and rec admission at  and further rx. Pt denies of any prior neurological sx and headaches, chest pain, SOB or palpitations. H/o Hypothyroidism takes medication. Lives home alone. Being followed by Dr. Blackburn but doesn't recall when she was seen last.     4/15/18 : Pt feeling better Speech improved and Rt UE weakness improved still feels weak she can not use as well as she wants. RT LE weakness persisting. NO Headaches, dizziness. Appreciated Cardiology consult and Echo and vascular surgery consults pending.     4/16/18 : Pt  feeling better with her Rt UE but Rt Le weakness unchanged. No new C/o Lot of questions about her BP and cardiac meds. As per PT refused PT yesterday. Started on low dose Coumadin. No other c/o. seen by Vascular surgery . Waiting for carotid dopplers.     4/17/18 : Pt on IV heparin with coumadin per cardiology. Offers no c/o. No Headache, no change in her weakness. Rt side weakness persisting.  Seen by Dr. Alexander Re Carotid stenosis    4/18/18 : Pt still c/o rt side weakness unchanged LE > than UE . Not cooperative for PT. On Iv heparin and Coumadin pT INR still subtherapeutic. C/o Upset stomach and constipation. No headaches, dizziness. No Intervention for carotid stenosis at this time.       MEDICATIONS  (STANDING):  aspirin enteric coated 81 milliGRAM(s) Oral daily  atorvastatin 80 milliGRAM(s) Oral at bedtime  heparin  Infusion.  Unit(s)/Hr (9 mL/Hr) IV Continuous <Continuous>  levothyroxine 100 MICROGram(s) Oral daily  metoprolol tartrate 25 milliGRAM(s) Oral two times a day  polyethylene glycol 3350 17 Gram(s) Oral daily  warfarin 10 milliGRAM(s) Oral daily      Vital Signs Last 24 Hrs  T(C): 36.4 (18 Apr 2018 04:53), Max: 37.1 (17 Apr 2018 16:38)  T(F): 97.5 (18 Apr 2018 04:53), Max: 98.7 (17 Apr 2018 16:38)  HR: 88 (18 Apr 2018 04:53) (82 - 90)  BP: 97/52 (18 Apr 2018 04:53) (97/52 - 130/52)  BP(mean): --  RR: 19 (18 Apr 2018 04:53) (18 - 19)  SpO2: 98% (18 Apr 2018 04:53) (96% - 98%)      Constitutional: awake and alert,  dysarthria improved.  HEENT: PERRLA, EOMI,   Neck: Supple.  Respiratory: Breath sounds are clear bilaterally  Cardiovascular: S1 and S2,  irregular rhythm  Gastrointestinal: soft, nontender  Extremities:  no edema  Vascular:  Carotid Bruit - no  Musculoskeletal: no joint swelling/tenderness.  Skin: No rashes    Neurological exam:  HF: A x O x 3. Appropriately interactive, normal affect. Speech dysarthria improved.  CN: DIVYA, EOMI, VF, mild Rt Facial  droop, weakness improved,  gag intact.   Motor: Rt side drift with weakness Rt UE 4/5 Rt LE 3-4/5 left side 5/5   Sens: Intact to light touch   Reflexes:  +2 Brisk on Rt > than left Plantars Up going on Rt down on left  Coord:  can not test on rt    Gait/Balance: Cannot test                                12.3   14.16 )-----------( 415      ( 18 Apr 2018 05:43 )             36.1           04-15 DzodjyztfqG8A 5.3  04-14 FxorgubjyoF0G 5.2    04-15 Chol 191  HDL 47 Trig 93    Radiology report:  - CT Head  < from: CT Brain Stroke Protocol (04.14.18 @ 08:02) >  Impression:    Age related involutional and microvascular ischemic changes, without   evidence of intracranial hemorrhage, mass effect or midline shift.     CTA carotids/Brain  < from: CT Angio Head w/ IV Cont (04.14.18 @ 10:09) >  IMPRESSION:        1.   Right carotid system:  No hemodynamically significant stenosis.        2.   Left carotid system:  moderate calcified plaque resulting in a   high-grade (80-90%) stenosis at the origin of the LEFT internal carotid   artery and mild stenosis at the origin of the LEFT external carotid   artery.         3.   Intracranial circulation: Occlusion with cut off of the mid LEFT     M1 segment with a 1 cm thrombus. There is faint flow in distal LEFT MCA   branches.        4.   Brain:  No significant lesion identified.      - MRI brain 4/14/18    < from: MR Head No Cont (04.14.18 @ 12:13) >  IMPRESSION: Acute small left basal ganglia/corona radiata infarct.   Findings discussed with the referring team by Dr. Pederson.

## 2018-04-18 NOTE — PROGRESS NOTE ADULT - SUBJECTIVE AND OBJECTIVE BOX
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 Patient states at roughly 4:30 AM on day of admission as she was getting out of bed to go to bathroom, getting dizzy and falling to ground. She reports no head trauma during the event. She states she was on floor for 2 hours, and had difficulty getting up to her feet.   A phone was noted nearby and she called 911. She notes during her time on the floor having noted right arm weakness  and leg weakness that was intermittent but progressively worsening. She notes having falls in the past due to "2 bum knees" and having difficulty getting up due to her joint issues. In ED, it was noted that patient had atrial fibrillation rhythm and CTA head demonstrated occlusion with cut off of the mid left M1 segment with a 1 cm thrombus and high grade stenosis of left carotid artery. Dr. Cook/Hugh Stroke physician was called by ED attending for probable neurovascular intervention but was deemed to be not a candidate by Dr. Cook due to age, lesion location and NIHSS score. She is presently laying in bed, resting comfortably, concerned regarding her arm and leg weakness but otherwise reports no dizziness, headache, vision changes. (14 Apr 2018 14:51)      #Review of system- rest of review of systems are negative except as mentioned in HPI    PHYSICAL EXAM:    Vital Signs Last 24 Hrs  T(C): 36.6 (18 Apr 2018 16:16), Max: 37.1 (17 Apr 2018 16:38)  T(F): 97.9 (18 Apr 2018 16:16), Max: 98.7 (17 Apr 2018 16:38)  HR: 84 (18 Apr 2018 16:16) (84 - 90)  BP: 136/50 (18 Apr 2018 16:16) (97/52 - 136/50)  BP(mean): --  RR: 18 (18 Apr 2018 16:16) (18 - 20)  SpO2: 98% (18 Apr 2018 16:16) (96% - 98%)  GENERAL: comfortable   HEAD:  Atraumatic, Normocephalic  EYES: EOMI, PERRLA, conjunctiva and sclera clear  HEENT: Moist mucous membranes  NECK: Supple, No JVD  NERVOUS SYSTEM:  Alert & Oriented X3, weakness noted on right side   CHEST/LUNG: Clear to auscultation bilaterally; No rales, rhonchi, wheezing, or rubs  HEART:S1S2 normal  ABDOMEN: Soft, Nontender, Nondistended; Bowel sounds present  GENITOURINARY- Voiding, no palpable bladder  EXTREMITIES:  2+ Peripheral Pulses, No clubbing, cyanosis, or edema  MUSCULOSKELETAL No muscle tenderness, Muscle tone normal,  SKIN-no rash, no lesion  PSYCH- Mood stable  LYMPH Node- No palpable lymph node                          12.3   14.16 )-----------( 415      ( 18 Apr 2018 05:43 )             36.1               PT/INR - ( 18 Apr 2018 05:43 )   PT: 12.7 sec;   INR: 1.17 ratio         PTT - ( 18 Apr 2018 00:12 )  PTT:73.2 sec      PT/INR - ( 18 Apr 2018 05:43 )   PT: 12.7 sec;   INR: 1.17 ratio         PTT - ( 18 Apr 2018 00:12 )  PTT:73.2 sec  CAPILLARY BLOOD GLUCOSE          MEDICATIONS  (STANDING):  aspirin enteric coated 81 milliGRAM(s) Oral daily  atorvastatin 80 milliGRAM(s) Oral at bedtime  heparin  Infusion.  Unit(s)/Hr (9 mL/Hr) IV Continuous <Continuous>  levothyroxine 100 MICROGram(s) Oral daily  metoprolol tartrate 25 milliGRAM(s) Oral two times a day  polyethylene glycol 3350 17 Gram(s) Oral daily  warfarin 10 milliGRAM(s) Oral daily    MEDICATIONS  (PRN):  heparin  Injectable 4000 Unit(s) IV Push every 6 hours PRN For aPTT less than 40  heparin  Injectable 2000 Unit(s) IV Push every 6 hours PRN For aPTT between 40 - 57

## 2018-04-18 NOTE — PROGRESS NOTE ADULT - ASSESSMENT
Assessment and Recommendation:   · Assessment		  86 y/o F p/w RLE and RUE weakness. Pt states she awoke this morning (went to bed around 10pm) and could not move her arm or leg which prompted her to have EMS bring her to the ER CT/CTA +ve for acute throbus in left MCA ED Physician Dr. Richardson spoke with Dr. Chase from stroke center he states pt is not a candidate for thrombectomy tba to [facility].     Acute Left CVA with Rt Hemiparesis Outside the window for TPA  With New onset AF.  Acute Infarct on MRI scan.  REc admit to telemetry/ cardiac admission with close neuro checks.  Convert to sinus rhythm  with medical management.  On Ac currently.  If Pt remains stable today can start low dose coumadin tonight .  Once PT/INR reaches therapeutic range change asa to 81 mg/plavix  .   F/u Ct brain in 48 hrs unless any new neurological c/o.  With heparin added high risk of hemorrhagic tansformation rec f/u CT scan.  Carotid dopplers Bilat stenosis left > Rt.  TTE/ NAN f/u with results.  Discussed with Pt,  and .  Rehab placement when Pt/INr therapeutic/ or if pt not getting Pt here can be thrasferred to acute rehab with ASA/plavix and coumadin and d/c asa/plavix once PT INR therapeutic.  Discussed with Dr. Espino.  If Pt sx fluctuating consider repeating MRI .  will F/u.

## 2018-04-19 LAB
APTT BLD: 112.7 SEC — HIGH (ref 27.5–37.4)
APTT BLD: 55.2 SEC — HIGH (ref 27.5–37.4)
APTT BLD: 83 SEC — HIGH (ref 27.5–37.4)
HCT VFR BLD CALC: 37.3 % — SIGNIFICANT CHANGE UP (ref 34.5–45)
HGB BLD-MCNC: 12 G/DL — SIGNIFICANT CHANGE UP (ref 11.5–15.5)
INR BLD: 1.34 RATIO — HIGH (ref 0.88–1.16)
MCHC RBC-ENTMCNC: 29.6 PG — SIGNIFICANT CHANGE UP (ref 27–34)
MCHC RBC-ENTMCNC: 32.2 GM/DL — SIGNIFICANT CHANGE UP (ref 32–36)
MCV RBC AUTO: 91.9 FL — SIGNIFICANT CHANGE UP (ref 80–100)
NRBC # BLD: 0 /100 WBCS — SIGNIFICANT CHANGE UP (ref 0–0)
PLATELET # BLD AUTO: 424 K/UL — HIGH (ref 150–400)
PROTHROM AB SERPL-ACNC: 14.6 SEC — HIGH (ref 9.8–12.7)
RBC # BLD: 4.06 M/UL — SIGNIFICANT CHANGE UP (ref 3.8–5.2)
RBC # FLD: 12.9 % — SIGNIFICANT CHANGE UP (ref 10.3–14.5)
WBC # BLD: 9.34 K/UL — SIGNIFICANT CHANGE UP (ref 3.8–10.5)
WBC # FLD AUTO: 9.34 K/UL — SIGNIFICANT CHANGE UP (ref 3.8–10.5)

## 2018-04-19 RX ORDER — PANTOPRAZOLE SODIUM 20 MG/1
8 TABLET, DELAYED RELEASE ORAL
Qty: 80 | Refills: 0 | Status: DISCONTINUED | OUTPATIENT
Start: 2018-04-19 | End: 2018-04-20

## 2018-04-19 RX ADMIN — PANTOPRAZOLE SODIUM 10 MG/HR: 20 TABLET, DELAYED RELEASE ORAL at 23:16

## 2018-04-19 RX ADMIN — Medication 81 MILLIGRAM(S): at 14:55

## 2018-04-19 RX ADMIN — HEPARIN SODIUM 2000 UNIT(S): 5000 INJECTION INTRAVENOUS; SUBCUTANEOUS at 08:21

## 2018-04-19 RX ADMIN — HEPARIN SODIUM 700 UNIT(S)/HR: 5000 INJECTION INTRAVENOUS; SUBCUTANEOUS at 15:08

## 2018-04-19 RX ADMIN — POLYETHYLENE GLYCOL 3350 17 GRAM(S): 17 POWDER, FOR SOLUTION ORAL at 14:56

## 2018-04-19 RX ADMIN — HEPARIN SODIUM 800 UNIT(S)/HR: 5000 INJECTION INTRAVENOUS; SUBCUTANEOUS at 08:14

## 2018-04-19 RX ADMIN — Medication 100 MICROGRAM(S): at 06:04

## 2018-04-19 RX ADMIN — HEPARIN SODIUM 700 UNIT(S)/HR: 5000 INJECTION INTRAVENOUS; SUBCUTANEOUS at 21:24

## 2018-04-19 RX ADMIN — ATORVASTATIN CALCIUM 80 MILLIGRAM(S): 80 TABLET, FILM COATED ORAL at 21:30

## 2018-04-19 RX ADMIN — Medication 25 MILLIGRAM(S): at 18:09

## 2018-04-19 RX ADMIN — Medication 25 MILLIGRAM(S): at 06:06

## 2018-04-19 RX ADMIN — WARFARIN SODIUM 10 MILLIGRAM(S): 2.5 TABLET ORAL at 21:30

## 2018-04-19 NOTE — DIETITIAN INITIAL EVALUATION ADULT. - NUTRITIONGOAL OUTCOME1
pt po intake meeting >80% ENN, reduce s/s mild malnutrition pt po intake meeting >80% ENN, reduce s/s malnutrition

## 2018-04-19 NOTE — DIETITIAN INITIAL EVALUATION ADULT. - NS AS NUTRI DX NUTRIENT
pt meets criteria for mild malnutrition in context of social circumstances/Malnutrition Malnutrition/pt meets criteria for moderate malnutrition in context of social circumstances

## 2018-04-19 NOTE — DIETITIAN INITIAL EVALUATION ADULT. - SIGNS/SYMPTOMS
po intake meeting <75% ENN x5days, 5# (4.8%) wt loss, moderate muscle wasting po intake meeting <75% ENN x5days, 5# (4.8%) wt loss, moderate muscle wasting, moderate fat loss

## 2018-04-19 NOTE — CHART NOTE - NSCHARTNOTEFT_GEN_A_CORE
Patient is 87y F PMHx HTN, admitted with Acute CVA due to L MC1 Thrombus, patient now with 1 episode GI bleed. Nurse notes heavy diarrhea with naomi red blood. Patient admits no prior episodes, however has eaten beets with dinner past couple nights. Admits no pain, no headaches, no increased weakness of extremities. Patient has been receiving hep gtt, and Coumadin 10mg nightly, has received her Coumadin tonight prior to bloody blowel movement.    ROS As stated above.    Vital Signs Last 24 Hrs  T(C): 36.7 (19 Apr 2018 21:35), Max: 36.7 (19 Apr 2018 16:47)  T(F): 98.1 (19 Apr 2018 21:35), Max: 98.1 (19 Apr 2018 21:35)  HR: 81 (19 Apr 2018 21:35) (76 - 92)  BP: 137/57 (19 Apr 2018 21:35) (103/43 - 137/57)  BP(mean): --  RR: 18 (19 Apr 2018 21:35) (18 - 18)  SpO2: 98% (19 Apr 2018 21:35) (97% - 99%)    PHYS EX  Gen: NAD, Comfortable  Abd: Soft, NT/ND  Ext: 5/5 Strength of RUE, RLE, 4/5 Strength LUE, LLE, baseline, I have examined this patient on night of admission    a/p: 87y F admitted with CVA, now with likely GI Bleed    -d/c Hep Infusion, Coumadin, [id number]  -GI Consult for AM  -CBC Stat H&H q8h x24 hours  -FOBT  -Neurochecks q4h  *If patient has neurological changes/increased weakness, will send for CT Head STAT, patient at risk for hemorrhagic conversion of thrombus.    -Case d/w Dr. aHrdwick

## 2018-04-19 NOTE — DIETITIAN INITIAL EVALUATION ADULT. - OTHER INFO
Nutrition Assessment for LOS: 87yoF admitted s/p fall, CVA. PMH includes: HTN, multifocal osteomyelitis, hypothyroidism. Pt reports fair po intake PTA, she isn't a "big eater" in general. Consuming ~3 light meals/daily, variable intake, meeting ~75% estimated nutrition needs regularly. PO intake meeting <75% of estimated nutrition needs in hospital x5days. Pt with decreased appetite at times PTA and while in hospital. Pt unsure if she had recent wt loss, but noted her UBW is 110#. Per EMR, admit wt is 105# indicating a 5# (4.5%) wt loss. Time frame of wt loss unclear. Pt c/o SOB on exertion and increased difficulty with ADLs. She has been mostly homebound x6mo. Reports difficulty obtaining groceries and preparing food. Living home alone for several years. Recieves MOW 1x/day. Pt interested in learning about programs that can help her with grocery shopping and getting around. d/w pt eating 3 wholesome meals daily, encouraging po intake. Recommended MVI/minerals supplements post discharge, pt ambivalent of supplements, but may try. pt refused Ensure supplementation. Rejected suggestion for appetite stimulant, adverse to taking more pills. Pt receiving coumadin in patient, educated on consistent intake of Vit. K. Education materials provided. Appears thin, BMI 20.5. 1+ edema to R/L legs noted, may be masking additional wt loss. Pt was on lasix at home but is not on it currently. Eric 20. NFPE reveals moderate muscle wasting of the temporal, clavicle, acromion process. Mild fat loss? Pt meets criteria for mild malnutrition in context of social circumstances secondary to variable po intake, meeting <75% ENN x5 days, wt loss, moderate msucle wasting. Recommendations: 1) Recommend SW consult 2) Add MVI/minerals to ensure 100% RDI met 3) Encourage po intake meeting >80% estimated nutrition needs 4) Obtain current wt. Will continue to monitor po intake, wt, labs. Nutrition Assessment for LOS: 87yoF admitted s/p fall, CVA. PMH includes: HTN, multifocal osteomyelitis, hypothyroidism. Pt reports fair po intake PTA, she isn't a "big eater" in general. Consuming ~3 light meals/daily, variable intake, meeting ~50-75% estimated nutrition needs for several months. PO intake meeting <75% of estimated nutrition needs in hospital x5days. Pt with decreased appetite at times PTA and while in hospital. Pt unsure if she had recent wt loss, but noted her UBW is 110#. Per EMR, admit wt is 105# indicating a 5# (4.5%) wt loss. Time frame of wt loss unclear. Pt c/o SOB on exertion and increased difficulty with ADLs. She has been mostly homebound x6mo. Reports difficulty obtaining groceries and preparing food. Living home alone for several years. Recieves MOW 1x/day. Pt interested in learning about programs that can help her with grocery shopping and getting around. d/w pt eating 3 wholesome meals daily, encouraging po intake. Recommended MVI/minerals supplements post discharge, pt ambivalent of supplements, but may try. pt refused Ensure supplementation. Rejected suggestion for appetite stimulant, adverse to taking more pills. Pt receiving coumadin in patient, educated on consistent intake of Vit. K. Education materials provided. Appears thin, BMI 20.5. 1+ edema to R/L legs noted, may be masking additional wt loss. Pt was on lasix at home, but is not on it currently. Eric 20. NFPE reveals moderate muscle wasting of the temporal, clavicle, scapula. Moderate fat loss of triceps. Pt meets criteria for moderate malnutrition in context of social circumstances secondary to variable po intake, meeting <75% ENN x5 days, wt loss, moderate msucle wasting, moderate fat loss. Recommendations: 1) Recommend SW consult 2) Add MVI/minerals to ensure 100% RDI met 3) Encourage po intake meeting >80% estimated nutrition needs 4) Obtain current wt. Will continue to monitor po intake, wt, labs.

## 2018-04-19 NOTE — DIETITIAN INITIAL EVALUATION ADULT. - ENERGY NEEDS
Ht.      60"        Wt.    105.1#             20.5 BMI                  IBW    100#               Pt is at    105%  IBW

## 2018-04-19 NOTE — PROGRESS NOTE ADULT - SUBJECTIVE AND OBJECTIVE BOX
cc: fall and weakness    HPI:  87 year old female with pmhx HTN, hypothyroidism, scarlet fever as child, h/o osteomyelitis in left elbow and right knee s/p multiple surgeries as child now presenting after having fall at home. Patient states at roughly 4:30 AM on day of admission as she was getting out of bed to go to bathroom, getting dizzy and falling to ground. She reports no head trauma during the event. She states she was on floor for 2 hours, and had difficulty getting up to her feet.   A phone was noted nearby and she called 911. She notes during her time on the floor having noted right arm weakness  and leg weakness that was intermittent but progressively worsening. She notes having falls in the past due to "2 bum knees" and having difficulty getting up due to her joint issues. In ED, it was noted that patient had atrial fibrillation rhythm and CTA head demonstrated occlusion with cut off of the mid left M1 segment with a 1 cm thrombus and high grade stenosis of left carotid artery. [facility] by ED attending for probable neurovascular intervention but was deemed to be not a candidate by Dr. Cook due to age, lesion location and NIHSS score. She is presently laying in bed, resting comfortably, concerned regarding her arm and leg weakness but otherwise reports no dizziness, headache, vision changes. (14 Apr 2018 14:51)    4/19: No complaints, feeling well. Awaiting therapeutic INR.     Review of system- rest of review of systems are negative except as mentioned in HPI    Vital Signs Last 24 Hrs  T(C): 36.4 (19 Apr 2018 11:42), Max: 36.6 (19 Apr 2018 04:14)  T(F): 97.5 (19 Apr 2018 11:42), Max: 97.9 (19 Apr 2018 04:14)  HR: 81 (19 Apr 2018 11:42) (76 - 112)  BP: 120/73 (19 Apr 2018 11:42) (97/43 - 121/37)  BP(mean): --  RR: --  SpO2: 99% (19 Apr 2018 11:42) (98% - 99%)    PHYSICAL EXAM:    GENERAL: comfortable   HEAD:  Atraumatic, Normocephalic  EYES: EOMI, PERRLA, conjunctiva and sclera clear  HEENT: Moist mucous membranes  NECK: Supple, No JVD  NERVOUS SYSTEM:  Alert & Oriented X3, weakness noted on right side   CHEST/LUNG: Clear to auscultation bilaterally; No rales, rhonchi, wheezing, or rubs  HEART:S1S2 normal  ABDOMEN: Soft, Nontender, Nondistended; Bowel sounds present  GENITOURINARY- Voiding, no palpable bladder  EXTREMITIES:  2+ Peripheral Pulses, No clubbing, cyanosis, or edema  MUSCULOSKELETAL No muscle tenderness, Muscle tone normal,  SKIN-no rash, no lesion  PSYCH- Mood stable  LYMPH Node- No palpable lymph node    MEDICATIONS  (STANDING):  aspirin enteric coated 81 milliGRAM(s) Oral daily  atorvastatin 80 milliGRAM(s) Oral at bedtime  heparin  Infusion.  Unit(s)/Hr (9 mL/Hr) IV Continuous <Continuous>  levothyroxine 100 MICROGram(s) Oral daily  metoprolol tartrate 25 milliGRAM(s) Oral two times a day  polyethylene glycol 3350 17 Gram(s) Oral daily  warfarin 10 milliGRAM(s) Oral daily    MEDICATIONS  (PRN):  heparin  Injectable 4000 Unit(s) IV Push every 6 hours PRN For aPTT less than 40  heparin  Injectable 2000 Unit(s) IV Push every 6 hours PRN For aPTT between 40 - 57                              12.0   9.34  )-----------( 424      ( 19 Apr 2018 06:20 )             37.3           CAPILLARY BLOOD GLUCOSE          PT/INR - ( 19 Apr 2018 06:20 )   PT: 14.6 sec;   INR: 1.34 ratio         PTT - ( 19 Apr 2018 14:18 )  PTT:112.7 sec      Assessment and Plan: 87 year old female with pmhx HTN, hypothyroidism, scarlet fever as child, h/o osteomyelitis in left elbow and right knee s/p multiple surgeries as child now presenting after having fall at home found to have acute CVA.    Acute CVA  - ct asa/statin  - echo noted- sever AS and sever MS -out pt management   -ct coumadin and heparin, monitor inr closely   -once therapeutic inr then d/c aspirin     Afib-rate under control     #ICA stensosis- Dr. Alexander follow up appreciated, out pt management as [facility]     #PT, rehab placement once INR therapeutic.  Monitor closely for intracranial bleeding.     Attending Statement:  40 minutes spent on total encounter; more than 50% of the visit was spent counseling and/or coordinating care by the attending physician.

## 2018-04-20 LAB
ANION GAP SERPL CALC-SCNC: 7 MMOL/L — SIGNIFICANT CHANGE UP (ref 5–17)
APTT BLD: 159.7 SEC — CRITICAL HIGH (ref 27.5–37.4)
APTT BLD: 33.1 SEC — SIGNIFICANT CHANGE UP (ref 27.5–37.4)
BUN SERPL-MCNC: 20 MG/DL — SIGNIFICANT CHANGE UP (ref 7–23)
CALCIUM SERPL-MCNC: 8.7 MG/DL — SIGNIFICANT CHANGE UP (ref 8.5–10.1)
CHLORIDE SERPL-SCNC: 107 MMOL/L — SIGNIFICANT CHANGE UP (ref 96–108)
CO2 SERPL-SCNC: 26 MMOL/L — SIGNIFICANT CHANGE UP (ref 22–31)
CREAT SERPL-MCNC: 0.75 MG/DL — SIGNIFICANT CHANGE UP (ref 0.5–1.3)
CULTURE RESULTS: SIGNIFICANT CHANGE UP
GLUCOSE SERPL-MCNC: 101 MG/DL — HIGH (ref 70–99)
HCT VFR BLD CALC: 37.1 % — SIGNIFICANT CHANGE UP (ref 34.5–45)
HCT VFR BLD CALC: 37.8 % — SIGNIFICANT CHANGE UP (ref 34.5–45)
HCT VFR BLD CALC: 38.2 % — SIGNIFICANT CHANGE UP (ref 34.5–45)
HGB BLD-MCNC: 12.3 G/DL — SIGNIFICANT CHANGE UP (ref 11.5–15.5)
HGB BLD-MCNC: 12.5 G/DL — SIGNIFICANT CHANGE UP (ref 11.5–15.5)
HGB BLD-MCNC: 12.8 G/DL — SIGNIFICANT CHANGE UP (ref 11.5–15.5)
INR BLD: 1.49 RATIO — HIGH (ref 0.88–1.16)
MCHC RBC-ENTMCNC: 29.9 PG — SIGNIFICANT CHANGE UP (ref 27–34)
MCHC RBC-ENTMCNC: 30.1 PG — SIGNIFICANT CHANGE UP (ref 27–34)
MCHC RBC-ENTMCNC: 30.4 PG — SIGNIFICANT CHANGE UP (ref 27–34)
MCHC RBC-ENTMCNC: 33.1 GM/DL — SIGNIFICANT CHANGE UP (ref 32–36)
MCHC RBC-ENTMCNC: 33.2 GM/DL — SIGNIFICANT CHANGE UP (ref 32–36)
MCHC RBC-ENTMCNC: 33.5 GM/DL — SIGNIFICANT CHANGE UP (ref 32–36)
MCV RBC AUTO: 90.4 FL — SIGNIFICANT CHANGE UP (ref 80–100)
MCV RBC AUTO: 90.7 FL — SIGNIFICANT CHANGE UP (ref 80–100)
MCV RBC AUTO: 90.7 FL — SIGNIFICANT CHANGE UP (ref 80–100)
NRBC # BLD: 0 /100 WBCS — SIGNIFICANT CHANGE UP (ref 0–0)
OB PNL STL: NEGATIVE — SIGNIFICANT CHANGE UP
PLATELET # BLD AUTO: 405 K/UL — HIGH (ref 150–400)
PLATELET # BLD AUTO: 414 K/UL — HIGH (ref 150–400)
PLATELET # BLD AUTO: 421 K/UL — HIGH (ref 150–400)
POTASSIUM SERPL-MCNC: 4 MMOL/L — SIGNIFICANT CHANGE UP (ref 3.5–5.3)
POTASSIUM SERPL-SCNC: 4 MMOL/L — SIGNIFICANT CHANGE UP (ref 3.5–5.3)
PROTHROM AB SERPL-ACNC: 16.2 SEC — HIGH (ref 9.8–12.7)
RBC # BLD: 4.09 M/UL — SIGNIFICANT CHANGE UP (ref 3.8–5.2)
RBC # BLD: 4.18 M/UL — SIGNIFICANT CHANGE UP (ref 3.8–5.2)
RBC # BLD: 4.21 M/UL — SIGNIFICANT CHANGE UP (ref 3.8–5.2)
RBC # FLD: 13 % — SIGNIFICANT CHANGE UP (ref 10.3–14.5)
RBC # FLD: 13.1 % — SIGNIFICANT CHANGE UP (ref 10.3–14.5)
RBC # FLD: 13.2 % — SIGNIFICANT CHANGE UP (ref 10.3–14.5)
SODIUM SERPL-SCNC: 140 MMOL/L — SIGNIFICANT CHANGE UP (ref 135–145)
SPECIMEN SOURCE: SIGNIFICANT CHANGE UP
WBC # BLD: 10.66 K/UL — HIGH (ref 3.8–10.5)
WBC # BLD: 10.96 K/UL — HIGH (ref 3.8–10.5)
WBC # BLD: 8.85 K/UL — SIGNIFICANT CHANGE UP (ref 3.8–10.5)
WBC # FLD AUTO: 10.66 K/UL — HIGH (ref 3.8–10.5)
WBC # FLD AUTO: 10.96 K/UL — HIGH (ref 3.8–10.5)
WBC # FLD AUTO: 8.85 K/UL — SIGNIFICANT CHANGE UP (ref 3.8–10.5)

## 2018-04-20 PROCEDURE — 99223 1ST HOSP IP/OBS HIGH 75: CPT

## 2018-04-20 PROCEDURE — 70450 CT HEAD/BRAIN W/O DYE: CPT | Mod: 26

## 2018-04-20 RX ORDER — WARFARIN SODIUM 2.5 MG/1
5 TABLET ORAL DAILY
Qty: 0 | Refills: 0 | Status: DISCONTINUED | OUTPATIENT
Start: 2018-04-20 | End: 2018-04-21

## 2018-04-20 RX ORDER — WARFARIN SODIUM 2.5 MG/1
10 TABLET ORAL DAILY
Qty: 0 | Refills: 0 | Status: DISCONTINUED | OUTPATIENT
Start: 2018-04-20 | End: 2018-04-20

## 2018-04-20 RX ORDER — WARFARIN SODIUM 2.5 MG/1
7.5 TABLET ORAL DAILY
Qty: 0 | Refills: 0 | Status: DISCONTINUED | OUTPATIENT
Start: 2018-04-20 | End: 2018-04-20

## 2018-04-20 RX ORDER — HEPARIN SODIUM 5000 [USP'U]/ML
4000 INJECTION INTRAVENOUS; SUBCUTANEOUS EVERY 6 HOURS
Qty: 0 | Refills: 0 | Status: DISCONTINUED | OUTPATIENT
Start: 2018-04-20 | End: 2018-04-20

## 2018-04-20 RX ORDER — HEPARIN SODIUM 5000 [USP'U]/ML
2000 INJECTION INTRAVENOUS; SUBCUTANEOUS EVERY 6 HOURS
Qty: 0 | Refills: 0 | Status: DISCONTINUED | OUTPATIENT
Start: 2018-04-20 | End: 2018-04-20

## 2018-04-20 RX ORDER — HEPARIN SODIUM 5000 [USP'U]/ML
900 INJECTION INTRAVENOUS; SUBCUTANEOUS
Qty: 25000 | Refills: 0 | Status: DISCONTINUED | OUTPATIENT
Start: 2018-04-20 | End: 2018-04-21

## 2018-04-20 RX ADMIN — ATORVASTATIN CALCIUM 80 MILLIGRAM(S): 80 TABLET, FILM COATED ORAL at 21:34

## 2018-04-20 RX ADMIN — HEPARIN SODIUM 800 UNIT(S)/HR: 5000 INJECTION INTRAVENOUS; SUBCUTANEOUS at 20:31

## 2018-04-20 RX ADMIN — PANTOPRAZOLE SODIUM 10 MG/HR: 20 TABLET, DELAYED RELEASE ORAL at 06:17

## 2018-04-20 RX ADMIN — Medication 100 MICROGRAM(S): at 05:58

## 2018-04-20 RX ADMIN — WARFARIN SODIUM 5 MILLIGRAM(S): 2.5 TABLET ORAL at 21:34

## 2018-04-20 RX ADMIN — HEPARIN SODIUM 900 UNIT(S)/HR: 5000 INJECTION INTRAVENOUS; SUBCUTANEOUS at 11:47

## 2018-04-20 RX ADMIN — Medication 25 MILLIGRAM(S): at 05:58

## 2018-04-20 RX ADMIN — HEPARIN SODIUM 0 UNIT(S)/HR: 5000 INJECTION INTRAVENOUS; SUBCUTANEOUS at 19:26

## 2018-04-20 RX ADMIN — Medication 25 MILLIGRAM(S): at 19:24

## 2018-04-20 NOTE — CONSULT NOTE ADULT - SUBJECTIVE AND OBJECTIVE BOX
Patient is a 87y old  Female who presents with a chief complaint of fall and weakness (14 Apr 2018 14:51)    HPI:  87 year old female with pmhx HTN, hypothyroidism, scarlet fever as child, h/o osteomyelitis in left elbow and right knee s/p multiple surgeries as child now presenting after having fall at home. Patient states at roughly 4:30 AM this morning as she was getting out of bed to go to bathroom, getting dizzy and falling to ground. She reports no head trauma during the event. She states she was on floor for 2 hours, and had difficulty getting up to her feet. A phone was noted nearby and she called 911. She notes during her time on the floor having noted right arm weakness  and leg weakness that was intermittent but progressively worsening. She notes having falls in the past due to "2 bum knees" and having difficulty getting up due to her joint issues. She states yesterday feeling well and reports no weakness, dizziness, palpitations, chest pain or shortness of breath. In ED, it was noted that patient had atrial fibrillation rhythm and CTA head demonstrated occlusion with cut off of the mid left M1 segment with a 1 cm thrombus and high grade stenosis of left carotid artery. Dr. Cook/Hugh Stroke physician was called by ED attending for probable neurovascular intervention but was deemed to be not a candidate by Dr. Cook due to age, lesion location and NIHSS score. She is presently laying in bed, resting comfortably, concerned regarding her arm and leg weakness but otherwise reports no dizziness, headache, vision changes. (14 Apr 2018 14:51)    4/20/2018-  Pt with c/o diarrhea/abdominal discomfort due to taken miralax.  Pt reports having x1 episode of naomi bleed last night associated with diarrhea per RN.  Pt reports eating a large about of beets for the past 2 days.  Denies any further melena or hematochezia.  Denies any fevers, SOB, CP, dizziness, or HAs.    PAST MEDICAL & SURGICAL HISTORY:  Essential hypertension  Chronic multifocal osteomyelitis, other site  Scarlet fever  S/P debridement    MEDICATIONS  (STANDING):  atorvastatin 80 milliGRAM(s) Oral at bedtime  heparin  Infusion. 900 Unit(s)/Hr (9 mL/Hr) IV Continuous <Continuous>  levothyroxine 100 MICROGram(s) Oral daily  metoprolol tartrate 25 milliGRAM(s) Oral two times a day  polyethylene glycol 3350 17 Gram(s) Oral daily  warfarin 7.5 milliGRAM(s) Oral daily    MEDICATIONS  (PRN):  heparin  Injectable 4000 Unit(s) IV Push every 6 hours PRN For aPTT less than 40  heparin  Injectable 2000 Unit(s) IV Push every 6 hours PRN For aPTT between 40 - 57    Allergies    No Known Allergies    FAMILY HISTORY:  Family history of heart disease (Father, Mother, Sibling)    REVIEW OF SYSTEMS:  CONSTITUTIONAL: +fatigue/weakness, no fevers or chills.  RESPIRATORY: No cough, wheezing, hemoptysis; No shortness of breath  CARDIOVASCULAR: No chest pain or palpitations  GASTROINTESTINAL: As per HPI.  All other review of systems is negative unless indicated above.    Vital Signs Last 24 Hrs  T(C): 36.6 (20 Apr 2018 04:24), Max: 36.7 (19 Apr 2018 16:47)  T(F): 97.8 (20 Apr 2018 04:24), Max: 98.1 (19 Apr 2018 21:35)  HR: 83 (20 Apr 2018 04:24) (81 - 92)  BP: 105/45 (20 Apr 2018 04:24) (103/43 - 137/57)  BP(mean): --  RR: 18 (20 Apr 2018 04:24) (18 - 18)  SpO2: 99% (20 Apr 2018 04:24) (97% - 99%)    PHYSICAL EXAM:  Constitutional: Elderly female with multiple medical issues, in NAD.  Respiratory: CTAB  Cardiovascular: S1 and S2, RRR, no M/G/R  Gastrointestinal: BS+, soft, NT/ND    LABS:                        12.5   10.66 )-----------( 405      ( 20 Apr 2018 04:06 )             37.8     04-19    140  |  107  |  20  ----------------------------<  101<H>  4.0   |  26  |  0.75    Ca    8.7      19 Apr 2018 23:55      PT/INR - ( 20 Apr 2018 04:06 )   PT: 16.2 sec;   INR: 1.49 ratio         PTT - ( 20 Apr 2018 04:06 )  PTT:33.1 sec      RADIOLOGY & ADDITIONAL STUDIES:

## 2018-04-20 NOTE — PROGRESS NOTE ADULT - ASSESSMENT
88 y/o F p/w RLE and RUE weakness. Pt states she awoke this morning (went to bed around 10pm) and could not move her arm or leg which prompted her to have EMS bring her to the ER CT/CTA +ve for acute thrombus in left MCA ED Physician Dr. Richardson spoke with Dr. Chase from stroke center he states pt is not a candidate for thrombectomy tba to [facility]     * New onset AF, fairly rate controlled at the moment. CHADSVASC score 7 ( HTN, Age >75, Stroke, Vascular disease, female).  Needs AC when safe from neuro stand point, agree with UFH first and when stroke is stable then possibly with a NOAC.  IF needed for rate control, can start PO diltiazem if able to tolerate oral route, otherwise can use IV drip.  2d echo, tele.    * Left carotid artery stenosis, ipsilateral to stroke and > 70% stenosis, agree with obtaining opinion from  vascular surgery re: CEA vs PTA. Otherwise, cont antiplatelet, statin. This stroke could be embolic from AF but also artery to artery embolization is possible.     4/16-  concerned about her intermittent weakness in the right upper extremity. She is not therapeutic yet on coumadin.   will start IV Heparin while she reaches an adequate INR.  Needs a CEA but need to determine the correct timing. Will have to speak to Dr Sanchez.  physical therapy very important at this time.   baby aspirin 81 mg daily.   continue metoprolol for afib rate control.    4/17-  continue IV heparin while loading with coumadin.   recommend she consult with her cardiologist at [facility], as an outpatient, regarding need for left CEA.  physical therapy  rehab placement when INR is therapeutic.    4/18-  increase coumadin dose to 10 mg daily.   continue IV heparin.   physical therapy.  Miralax daily for bowels.    4/20  resume IV heparin and coumadin  follow  hematocrit  once therapeutic, will need rehab.   once stronger, should speak to her cardiologist at [facility] about TAVR and then carotid surgery.

## 2018-04-20 NOTE — PROGRESS NOTE ADULT - ASSESSMENT
Assessment and Plan:   87 year old Woman with pmhx HTN, hypothyroidism, scarlet fever as child, h/o osteomyelitis in left elbow and right knee s/p multiple surgeries as child now presenting after having fall at home found to have acute CVA.    Acute CVA  - con't asa/statin  - echo noted- sever AS and sever MS -out pt management   - ct coumadin and heparin w/out bolus given risk for ICH bleed  - monitor coags closely   - appreciate neuro input - repeat CTH assess for conversion bleed  - once therapeutic inr then d/c aspirin     Afib- rate controlled  - con't  metoprolol  / coagulation    ICA stensosis-   Dr. Alexander follow up appreciated, out pt management as [facility]     Behavioral disturbances  - staff reports pt is having "manipulative behaviors" and son reports pt has called her numerous times last night & today stating she "almost " last night  - will repeat head CT stat     Dispo  - full code  - DC to rehab when INR therapeutic  - PT / SW for post dc needs Assessment and Plan:   87 year old Woman with pmhx HTN, hypothyroidism, scarlet fever as child, h/o osteomyelitis in left elbow and right knee s/p multiple surgeries as child now presenting after having fall at home found to have acute CVA.    Acute CVA:   - con't asa/statin  - echo noted- sever AS and sever MS -out pt management   - ct coumadin and heparin w/out bolus given risk for ICH bleed  - monitor coags closely   - appreciate neuro input - repeat CTH assess for conversion bleed  - aspirin stopped this AM due to concern for GI bleed which was ruled out. Will continue to hold aspirin given elevated INR to reduce risk of bleed.     Afib- rate controlled  - con't  metoprolol  / coagulation    ICA stensosis-   Dr. Alexander follow up appreciated, out pt management as [facility]     Behavioral disturbances  - staff reports pt is having "manipulative behaviors" and son reports pt has called her numerous times last night & today stating she "almost " last night  - will repeat head CT stat     Dispo  - full code  - DC to rehab when INR therapeutic  - PT / SW for post dc needs    Attending Statement:  40 minutes spent on total encounter; more than 50% of the visit was spent counseling and/or coordinating care by the attending physician.  Patient seen and examined with NP Sandra.  Agree with physical exam and assessment and plan.

## 2018-04-20 NOTE — CONSULT NOTE ADULT - ASSESSMENT
88 y/o female with multiple medical issues now with diarrhea/x1 episode of ?flank red blood last night.    Plan:  Continue to trend H/H-stable.  Hold miralax due to diarrhea.  If diarrhea continues r/o c. diff?  Occult stool - negative.    This was likely due to dietary intake- beets consumption for the past 2 days.  Will continue to monitor closely.    Plan discussed with pt, Dr. Leon, and Dr. Chambers.

## 2018-04-20 NOTE — PROGRESS NOTE ADULT - SUBJECTIVE AND OBJECTIVE BOX
cc: fall and weakness    HPI: 87 year old Woman with pmhx HTN, hypothyroidism, scarlet fever as child, h/o osteomyelitis in left elbow and right knee s/p multiple surgeries as child now presenting after having fall at home. Patient states at roughly 4:30 AM on day of admission as she was getting out of bed to go to bathroom, getting dizzy and falling to ground. She reports no head trauma during the event. She states she was on floor for 2 hours, and had difficulty getting up to her feet.   A phone was noted nearby and she called 911. She notes during her time on the floor having noted right arm weakness  and leg weakness that was intermittent but progressively worsening. She notes having falls in the past due to "2 bum knees" and having difficulty getting up due to her joint issues. In ED, it was noted that patient had atrial fibrillation rhythm and CTA head demonstrated occlusion with cut off of the mid left M1 segment with a 1 cm thrombus and high grade stenosis of left carotid artery. Dr. Cook/Hugh Stroke physician was called by ED attending for probable neurovascular intervention but was deemed to be not a candidate by Dr. Cook due to age, lesion location and NIHSS score. She is presently laying in bed, resting comfortably, concerned regarding her arm and leg weakness but otherwise reports no dizziness, headache, vision changes. (14 Apr 2018 14:51)    4/19: No complaints, feeling well. Awaiting therapeutic INR.   4/20: had LBM overnight x5 after taking miralax; had red stool d/t her eating beets for dinner; denies CP/palpitations/SOB; no abd pain/n/v/f/c; concern that she will have to get a new PCP (she's not clear as to why she thinks Paul does not desire to be her PCP)    Review of system- rest of review of systems are negative except as mentioned in HPI    Vital Signs Last 24 Hrs  T(C): 37.3 (20 Apr 2018 11:26), Max: 37.3 (20 Apr 2018 11:26)  T(F): 99.1 (20 Apr 2018 11:26), Max: 99.1 (20 Apr 2018 11:26)  HR: 76 (20 Apr 2018 11:26) (76 - 92)  BP: 101/43 (20 Apr 2018 11:26) (101/43 - 137/57)  BP(mean): --  RR: 18 (20 Apr 2018 11:26) (18 - 18)  SpO2: 100% (20 Apr 2018 11:26) (97% - 100%)    PHYSICAL EXAM:    GENERAL: frail, NAD   HEAD:  Atraumatic, Normocephalic  EYES: EOMI, PERRLA, conjunctiva and sclera clear  HEENT: Moist mucous membranes  NECK: Supple, No JVD  NERVOUS SYSTEM:  Alert & Oriented X3,  R side weakness   CHEST/LUNG: Clear to auscultation bilaterally; No rales, rhonchi, wheezing, or rubs  HEART:S1S2 normal  ABDOMEN: Soft, Nontender, Nondistended; Bowel sounds present  GENITOURINARY- Voiding, no palpable bladder  EXTREMITIES:  2+ Peripheral Pulses, No clubbing, cyanosis, or edema  MUSCULOSKELETAL No muscle tenderness, Muscle tone normal, cannot resist or lift RLE  SKIN-no rash, no lesion  PSYCH- very talkative with prolonged conversation, mood stable  LYMPH Node- No palpable lymph node    LABS  4-20             12.5   10.66 )-----------( 405      ( 20 Apr 2018 04:06 )             37.8     04-19    140  |  107  |  20  ----------------------------<  101<H>  4.0   |  26  |  0.75    Ca    8.7      19 Apr 2018 23:55  PT/INR - ( 20 Apr 2018 04:06 )   PT: 16.2 sec;   INR: 1.49 ratio      PTT - ( 20 Apr 2018 04:06 )  PTT:33.1 sec    4-19                  12.0   9.34  )-----------( 424      ( 19 Apr 2018 06:20 )             37.3     CAPILLARY BLOOD GLUCOSE  PT/INR - ( 19 Apr 2018 06:20 )   PT: 14.6 sec;   INR: 1.34 ratio       PTT - ( 19 Apr 2018 14:18 )  PTT:112.7 sec      MEDICATIONS  (STANDING):  atorvastatin 80 milliGRAM(s) Oral at bedtime  heparin  Infusion. 900 Unit(s)/Hr (9 mL/Hr) IV Continuous <Continuous>  levothyroxine 100 MICROGram(s) Oral daily  metoprolol tartrate 25 milliGRAM(s) Oral two times a day  polyethylene glycol 3350 17 Gram(s) Oral daily  warfarin 7.5 milliGRAM(s) Oral daily    MEDICATIONS  (PRN):

## 2018-04-20 NOTE — CONSULT NOTE ADULT - ASSESSMENT
A/P:   86 yo female with new onset afib/flutter Vidal vasc #5,  embolic CVA    D/W Dr Chambers  coumadin 5 mg po tonight  hep gtt no boluses  repeat Ct scan head tonight  daily CBC, BMp, INR      Thank you for the consult will follow

## 2018-04-20 NOTE — PROGRESS NOTE ADULT - SUBJECTIVE AND OBJECTIVE BOX
Patient is a 87y old  Female who presents with a chief complaint of fall and weakness (14 Apr 2018 14:51)      She is 86 Yo Female brought to ER with woke up this morning with Rt side arm and leg weakness went to bed last night at 10 pm. Could not move Rt arm or leg well with difficulty with her speech noted in ED. Not a TPA candidate but stat CT/ cTA performed noted Thrombus in left   MCA and ED physician on called  [facility] stroke team spoke with Dr. Cook and decided not a candidate for Thrombectomy and rec admission at  and further rx. Pt denies of any prior neurological sx and headaches, chest pain, SOB or palpitations. H/o Hypothyroidism takes medication. Lives home alone. Being followed by Dr. Blackburn but doesn't recall when she was seen last.     Examined at bed side, feels better, denies angina, orthopnea, PND, leg swelling, palpitations, syncope. Has no h/o cardiac arrhythmia or CAD.    4/15 - no new complaints. Tele reviewed, AF rate controlled. Agree with BB for rate control. Stable from CV stand point.   Cont current regimen. Start AC when safe from neuro standpoint. Pending vascular surgery consult re: SID. Agree with high intensity statin and ASA. ECHO pending.     4/16-  having intermittent weak sensation in the right hand.   tolerating coumadin. Not on heparin and not therapeutic with the coumadin.   remains in a rate controlled Atrial Fibrillation.  Usually followed for cardiology by Dr Bill at [facility].     4/17-  on IV heparin and loading with coumadin.   rate controlled atrial fibrillation.   spoke to Dr Sanchez. CEA not indicated at this time.     4/18-  not therapeutic on coumadin yet.   tolerating heparin. still with right sided weakness.   decreased appetite and some stomach discomfort. might be constipated. difficult for her to use bedpan and commode is too high for her.     4/19-  last night, had episode of red stool. guiac negative. have resumed coumadin and heparin.   not therapeutic in terms of anticoagulation.  echo shows tight AS    Allergies    No Known Allergies    Intolerances        MEDICATIONS  (STANDING):  atorvastatin 80 milliGRAM(s) Oral at bedtime  heparin  Infusion. 900 Unit(s)/Hr (9 mL/Hr) IV Continuous <Continuous>  levothyroxine 100 MICROGram(s) Oral daily  metoprolol tartrate 25 milliGRAM(s) Oral two times a day  polyethylene glycol 3350 17 Gram(s) Oral daily  warfarin 5 milliGRAM(s) Oral daily    MEDICATIONS  (PRN):    REVIEW OF SYSTEMS:    RESPIRATORY: No cough, wheezing, hemoptysis; No shortness of breath  CARDIOVASCULAR: No chest pain or palpitations  All other review of systems is negative unless indicated above      PHYSICAL EXAM:  Daily     Daily   Vital Signs Last 24 Hrs  T(C): 36.3 (20 Apr 2018 17:18), Max: 37.3 (20 Apr 2018 11:26)  T(F): 97.3 (20 Apr 2018 17:18), Max: 99.1 (20 Apr 2018 11:26)  HR: 83 (20 Apr 2018 17:18) (76 - 87)  BP: 134/52 (20 Apr 2018 17:18) (101/43 - 137/57)  BP(mean): --  RR: 18 (20 Apr 2018 17:18) (18 - 18)  SpO2: 99% (20 Apr 2018 17:18) (98% - 100%)    Constitutional: NAD, awake and alert, well-developed  HEENT: PERR, EOMI, Normal Hearing, MMM  Neck: Soft and supple, No LAD, No JVD  Respiratory: Breath sounds are clear bilaterally, No wheezing, rales or rhonchi  Cardiovascular: S1 and S2, regular rate and rhythm, no Murmurs, gallops or rubs  Gastrointestinal: Bowel Sounds present, soft, nontender, nondistended, no guarding, no rebound  Extremities: No peripheral edema  Vascular: 2+ peripheral pulses  Neurological: A/O x 3, no focal deficits  Musculoskeletal: 5/5 strength b/l upper and lower extremities  Skin: No rashes    LABS: All Labs Reviewed:                        12.8   8.85  )-----------( 421      ( 20 Apr 2018 17:53 )             38.2     04-19    140  |  107  |  20  ----------------------------<  101<H>  4.0   |  26  |  0.75    Ca    8.7      19 Apr 2018 23:55      PT/INR - ( 20 Apr 2018 04:06 )   PT: 16.2 sec;   INR: 1.49 ratio         PTT - ( 20 Apr 2018 04:06 )  PTT:33.1 sec      Echo:  < from: Transthoracic Echocardiogram (04.16.18 @ 11:40) >   EXAM:  2D ECHOCARDIOGRAM AD         PROCEDURE DATE:  04/16/2018        INTERPRETATION:  Transthoracic Echocardiography Report (TTE)     Demographics     Patient name        RANDI SMALLS    Age           87 year(s)     Med Rec #           [id number]         Gender        Female     Account #           [id number]           Date of Birth 02/20/1931     Interpreting        Khris Ahn MD Room Number   0349   Physician     Referring Physician Steven Buck MD  Sonographer   Alessandra Kaye                                                   [facility]     Date of study       04/16/2018 11:22                       AM     Height              59.84 in          Weight        105.82 pounds    Type of Study:     TTE procedure: 2D echocardiogram     BP: 129/94 mmHg     Study Location: 3ETechnical Quality: Fair    Indications   1) R01.1 - Cardiac murmur    M-Mode Measurements (cm)     LVEDd: 5.06 cm            LVESd: 3.5 cm   IVSEd: 1.08 cm   LVPWd: 1.04 cm            AO Root Dimension: 2.8 cm                         LA: 5.3 cm                             LVOT: 2 cm    Doppler Measurements:     AV Velocity:413 cm/s   AV Peak Gradient: 68.23 mmHg                 MV P1/2t: 84 msec   AV Mean Gradient: 33 mmHg                    MVA by PHT2.62   AV Area (Continuity):0.56 cm^2   TR Velocity:324 cm/s   TR Gradient:41.9904 mmHg   Estimated RAP:10 mmHg   RVSP:52 mmHg     Findings     Mitral Valve   Severe mitral stenosis is present.   Moderate (2+) mitral regurgitation is present.   Severe mitral annular calcification is present.     Aortic Valve   Significant fibrocalcific changes noted to the aortic valve leaflets with   restriction in leaflet excursion. Peak transaortic gradient is 68 mmHg;   this finding is consistent with severe aorticstenosis.   Mild (1+) aortic regurgitation is present.     Tricuspid Valve   Normal appearing tricuspid valve structure and function.   Mild to moderate tricuspid valve regurgitation is present.     Pulmonic Valve   Normal appearing pulmonic valve structure and function.   Trace pulmonic valvular regurgitation is present.     Left Atrium   The left atrium is severely dilated.     Left Ventricle   The left ventricle is normal in size, wall thickness, wall motion and   contractility.   Estimated left ventricular ejection fraction is 55-60 %.     Right Atrium   Normal appearing right atrium.     Right Ventricle   Normal appearing right ventricle structure and function.     Pericardial Effusion   No evidence of pericardial effusion.     Pleural Effusion   No evidence of pleural effusion.     Miscellaneous   All visualized extra cardiac structures appears to be normal.     Impression     Summary     Severe mitral stenosis is present.   Moderate (2+) mitral regurgitation is present.   Severe mitral annular calcification is present.   Significant fibrocalcific changes noted to the aortic valve leaflets with   restriction in leaflet excursion. Peak transaortic gradient is 68 mmHg;   this finding is consistent with severe aortic stenosis.   Mild (1+) aortic regurgitation is present.   Normal appearing tricuspid valve structure and function.   Mild to moderate tricuspid valve regurgitation is present.   The left atrium is severely dilated.   The left ventricle is normal in size, wall thickness, wall motion and   contractility.   Estimated left ventricular ejection fraction is 55-60 %.   Normal appearing right ventricle structure and function.   No evidence of pericardial effusion.     Signature     ----------------------------------------------------------------   Electronically signed by Khris Ahn MD(Interpreting   physician) on 04/16/2018 05:44 PM   ----------------------------------------------

## 2018-04-20 NOTE — CONSULT NOTE ADULT - SUBJECTIVE AND OBJECTIVE BOX
HPI  HPI:  87 year old female with pmhx HTN, hypothyroidism, scarlet fever as child, h/o osteomyelitis in left elbow and right knee s/p multiple surgeries as child now presenting after having fall at home. Patient states at roughly 4:30 AM this morning as she was getting out of bed to go to bathroom, getting dizzy and falling to ground. She reports no head trauma during the event. She states she was on floor for 2 hours, and had difficulty getting up to her feet. A phone was noted nearby and she called 911. She notes during her time on the floor having noted right arm weakness  and leg weakness that was intermittent but progressively worsening. She notes having falls in the past due to "2 bum knees" and having difficulty getting up due to her joint issues. She states yesterday feeling well and reports no weakness, dizziness, palpitations, chest pain or shortness of breath. In ED, it was noted that patient had atrial fibrillation rhythm and CTA head demonstrated occlusion with cut off of the mid left M1 segment with a 1 cm thrombus and high grade stenosis of left carotid artery. Dr. Cook/Hugh Stroke physician was called by ED attending for probable neurovascular intervention but was deemed to be not a candidate by Dr. Cook due to age, lesion location and NIHSS score. She is presently laying in bed, resting comfortably, concerned regarding her arm and leg weakness but otherwise reports no dizziness, headache, vision changes. (14 Apr 2018 14:51)      Patient is a 87y old  Female who presents with a chief complaint of fall and weakness (14 Apr 2018 14:51)      Consulted by Dr. Chambers  for VTE prophylaxis, risk stratification, and anticoagulation management.    PAST MEDICAL & SURGICAL HISTORY:  Essential hypertension  Chronic multifocal osteomyelitis, other site  Scarlet fever  S/P debridement          CrCl:40.29        IMPROVE VTE Risk Score: #5    [id number] Score: #6    IMPROVE Bleeding Risk Score    Falls Risk:   High (x  )  Mod (  )  Low (  )      FAMILY HISTORY:  Family history of heart disease (Father, Mother, Sibling)    Denies any personal or familial history of clotting or bleeding disorders.    Allergies    No Known Allergies    Intolerances        REVIEW OF SYSTEMS    (  )Fever	     (  )Constipation	(  )SOB				(  )Headache	(  )Dysuria  (  )Chills	     (  )Melena	(  )Dyspnea present on exertion	                    (  )Dizziness                    (  )Polyuria  (  )Nausea	     (  )Hematochezia	(  )Cough			                    (  )Syncope   	(  )Hematuria  (  )Vomiting    (  )Chest Pain	(  )Wheezing			(  )Weakness  (  )Diarrhea     (  )Palpitations	(  )Anorexia			(  )Myalgia    All other review of systems negative: Yes          PHYSICAL EXAM:    Constitutional: Appears Well    Neurological: A& O x 3, STEELE  RLE <LLE    Skin: Warm    Respiratory and Thorax: normal effort; Breath sounds: normal; No rales/wheezing/rhonchi  	  Cardiovascular: S1, S2, irregular, NMBR	MP Aflutter    Gastrointestinal: BS + x 4Q, nontender	    Genitourinary:  Bladder nondistended, nontender    Musculoskeletal:   General Right:   no muscle/joint tenderness,   normal tone, no joint swelling,   ROM: limited	    General Left:   no muscle/joint tenderness,   normal tone, no joint swelling,   ROM: /full    Lower extrems:   Right: no calf tenderness              negative jojo's sign               + pedal pulses    Left:   no calf tenderness              negative jojo's sign               + pedal pulses                          12.5   10.66 )-----------( 405      ( 20 Apr 2018 04:06 )             37.8       04-19    140  |  107  |  20  ----------------------------<  101<H>  4.0   |  26  |  0.75    Ca    8.7      19 Apr 2018 23:55        PT/INR - ( 20 Apr 2018 04:06 )   PT: 16.2 sec;   INR: 1.49 ratio         PTT - ( 20 Apr 2018 04:06 )  PTT:33.1 sec				    MEDICATIONS  (STANDING):  atorvastatin 80 milliGRAM(s) Oral at bedtime  heparin  Infusion. 900 Unit(s)/Hr IV Continuous <Continuous>  levothyroxine 100 MICROGram(s) Oral daily  metoprolol tartrate 25 milliGRAM(s) Oral two times a day  polyethylene glycol 3350 17 Gram(s) Oral daily  warfarin 5 milliGRAM(s) Oral daily          DVT Prophylaxis:  LMWH                   (  )  Heparin SQ           (  )  Coumadin             ( x )  Xarelto                  (  )  Eliquis                   (  )  Venodynes           (  )  Ambulation          (  )  UFH                       ( x )  Contraindicated  (  )

## 2018-04-21 LAB
ANION GAP SERPL CALC-SCNC: 7 MMOL/L — SIGNIFICANT CHANGE UP (ref 5–17)
APTT BLD: 186.6 SEC — CRITICAL HIGH (ref 27.5–37.4)
BUN SERPL-MCNC: 19 MG/DL — SIGNIFICANT CHANGE UP (ref 7–23)
CALCIUM SERPL-MCNC: 8.8 MG/DL — SIGNIFICANT CHANGE UP (ref 8.5–10.1)
CHLORIDE SERPL-SCNC: 111 MMOL/L — HIGH (ref 96–108)
CO2 SERPL-SCNC: 24 MMOL/L — SIGNIFICANT CHANGE UP (ref 22–31)
CREAT SERPL-MCNC: 0.71 MG/DL — SIGNIFICANT CHANGE UP (ref 0.5–1.3)
GLUCOSE SERPL-MCNC: 88 MG/DL — SIGNIFICANT CHANGE UP (ref 70–99)
HCT VFR BLD CALC: 37.8 % — SIGNIFICANT CHANGE UP (ref 34.5–45)
HGB BLD-MCNC: 12.7 G/DL — SIGNIFICANT CHANGE UP (ref 11.5–15.5)
INR BLD: 2.72 RATIO — HIGH (ref 0.88–1.16)
MAGNESIUM SERPL-MCNC: 2.2 MG/DL — SIGNIFICANT CHANGE UP (ref 1.6–2.6)
MCHC RBC-ENTMCNC: 30.3 PG — SIGNIFICANT CHANGE UP (ref 27–34)
MCHC RBC-ENTMCNC: 33.6 GM/DL — SIGNIFICANT CHANGE UP (ref 32–36)
MCV RBC AUTO: 90.2 FL — SIGNIFICANT CHANGE UP (ref 80–100)
NRBC # BLD: 0 /100 WBCS — SIGNIFICANT CHANGE UP (ref 0–0)
PLATELET # BLD AUTO: 381 K/UL — SIGNIFICANT CHANGE UP (ref 150–400)
POTASSIUM SERPL-MCNC: 3.9 MMOL/L — SIGNIFICANT CHANGE UP (ref 3.5–5.3)
POTASSIUM SERPL-SCNC: 3.9 MMOL/L — SIGNIFICANT CHANGE UP (ref 3.5–5.3)
PROTHROM AB SERPL-ACNC: 30 SEC — HIGH (ref 9.8–12.7)
RBC # BLD: 4.19 M/UL — SIGNIFICANT CHANGE UP (ref 3.8–5.2)
RBC # FLD: 13.2 % — SIGNIFICANT CHANGE UP (ref 10.3–14.5)
SODIUM SERPL-SCNC: 142 MMOL/L — SIGNIFICANT CHANGE UP (ref 135–145)
WBC # BLD: 7.49 K/UL — SIGNIFICANT CHANGE UP (ref 3.8–10.5)
WBC # FLD AUTO: 7.49 K/UL — SIGNIFICANT CHANGE UP (ref 3.8–10.5)

## 2018-04-21 PROCEDURE — 99233 SBSQ HOSP IP/OBS HIGH 50: CPT

## 2018-04-21 RX ORDER — WARFARIN SODIUM 2.5 MG/1
5 TABLET ORAL DAILY
Qty: 0 | Refills: 0 | Status: DISCONTINUED | OUTPATIENT
Start: 2018-04-21 | End: 2018-04-21

## 2018-04-21 RX ADMIN — Medication 25 MILLIGRAM(S): at 05:58

## 2018-04-21 RX ADMIN — Medication 100 MICROGRAM(S): at 05:58

## 2018-04-21 RX ADMIN — HEPARIN SODIUM 0 UNIT(S)/HR: 5000 INJECTION INTRAVENOUS; SUBCUTANEOUS at 02:43

## 2018-04-21 RX ADMIN — ATORVASTATIN CALCIUM 80 MILLIGRAM(S): 80 TABLET, FILM COATED ORAL at 22:20

## 2018-04-21 RX ADMIN — HEPARIN SODIUM 700 UNIT(S)/HR: 5000 INJECTION INTRAVENOUS; SUBCUTANEOUS at 03:46

## 2018-04-21 RX ADMIN — Medication 25 MILLIGRAM(S): at 17:56

## 2018-04-21 NOTE — PROGRESS NOTE ADULT - SUBJECTIVE AND OBJECTIVE BOX
HPI  HPI:  87 year old female with pmhx HTN, hypothyroidism, scarlet fever as child, h/o osteomyelitis in left elbow and right knee s/p multiple surgeries as child now presenting after having fall at home. Patient states at roughly 4:30 AM this morning as she was getting out of bed to go to bathroom, getting dizzy and falling to ground. She reports no head trauma during the event. She states she was on floor for 2 hours, and had difficulty getting up to her feet. A phone was noted nearby and she called 911. She notes during her time on the floor having noted right arm weakness  and leg weakness that was intermittent but progressively worsening. She notes having falls in the past due to "2 bum knees" and having difficulty getting up due to her joint issues. She states yesterday feeling well and reports no weakness, dizziness, palpitations, chest pain or shortness of breath. In ED, it was noted that patient had atrial fibrillation rhythm and CTA head demonstrated occlusion with cut off of the mid left M1 segment with a 1 cm thrombus and high grade stenosis of left carotid artery. Dr. Cook/Hugh Stroke physician was called by ED attending for probable neurovascular intervention but was deemed to be not a candidate by Dr. Cook due to age, lesion location and NIHSS score. She is presently laying in bed, resting comfortably, concerned regarding her arm and leg weakness but otherwise reports no dizziness, headache, vision changes. (14 Apr 2018 14:51)      Patient is a 87y old  Female who presents with a chief complaint of fall and weakness (14 Apr 2018 14:51)      Consulted by Dr. Chambers  for VTE prophylaxis, risk stratification, and anticoagulation management.    PAST MEDICAL & SURGICAL HISTORY:  Essential hypertension  Chronic multifocal osteomyelitis, other site  Scarlet fever  S/P debridement    CrCl:40.29    IMPROVE VTE Risk Score: #5    [id number] Score: #6    IMPROVE Bleeding Risk Score    Falls Risk:   High (x  )  Mod (  )  Low (  )    4/21: Patient seen at bedside, discussed current INR from 1.49 to 2.72- brisk rise. Will hold coumadin tonight. Patient verbalizes understanding- explains that she doesn't feel well today- tired- and wants to work with PT. Needs OT for hand strength and dexterity.    FAMILY HISTORY:  Family history of heart disease (Father, Mother, Sibling)    Denies any personal or familial history of clotting or bleeding disorders.    Allergies    No Known Allergies    Intolerances        REVIEW OF SYSTEMS    (  )Fever	     (  )Constipation	(  )SOB				(  )Headache	(  )Dysuria  (  )Chills	     (  )Melena	(  )Dyspnea present on exertion	                    (  )Dizziness                    (  )Polyuria  (  )Nausea	     (  )Hematochezia	(  )Cough			                    (  )Syncope   	(  )Hematuria  (  )Vomiting    (  )Chest Pain	(  )Wheezing			(  )Weakness  (  )Diarrhea     (  )Palpitations	(  )Anorexia			(  )Myalgia    All other review of systems negative: Yes          PHYSICAL EXAM:    Constitutional: Appears Well    Neurological: A& O x 3, STEELE  RLE <LLE    Skin: Warm    Respiratory and Thorax: normal effort; Breath sounds: normal; No rales/wheezing/rhonchi  	  Cardiovascular: S1, S2, irregular, NMBR	MP Aflutter    Gastrointestinal: BS + x 4Q, nontender	    Genitourinary:  Bladder nondistended, nontender    Musculoskeletal:   General Right:   no muscle/joint tenderness,   normal tone, no joint swelling,   ROM: limited	    General Left:   no muscle/joint tenderness,   normal tone, no joint swelling,   ROM: /full    Lower extrems:   Right: no calf tenderness              negative jojo's sign               + pedal pulses    Left:   no calf tenderness              negative jojo's sign               + pedal pulses                             12.7   7.49  )-----------( 381      ( 21 Apr 2018 05:51 )             37.8       04-21    142  |  111<H>  |  19  ----------------------------<  88  3.9   |  24  |  0.71    Ca    8.8      21 Apr 2018 05:51  Mg     2.2     04-21        PT/INR - ( 21 Apr 2018 05:51 )   PT: 30.0 sec;   INR: 2.72 ratio         PTT - ( 21 Apr 2018 01:54 )  PTT:186.6 sec                       12.5   10.66 )-----------( 405      ( 20 Apr 2018 04:06 )             37.8       04-19    140  |  107  |  20  ----------------------------<  101<H>  4.0   |  26  |  0.75    Ca    8.7      19 Apr 2018 23:55        PT/INR - ( 20 Apr 2018 04:06 )   PT: 16.2 sec;   INR: 1.49 ratio         PTT - ( 20 Apr 2018 04:06 )  PTT:33.1 sec				    MEDICATIONS  (STANDING):  atorvastatin 80 milliGRAM(s) Oral at bedtime  heparin  Infusion. 900 Unit(s)/Hr IV Continuous <Continuous>  levothyroxine 100 MICROGram(s) Oral daily  metoprolol tartrate 25 milliGRAM(s) Oral two times a day  polyethylene glycol 3350 17 Gram(s) Oral daily  warfarin 5 milliGRAM(s) Oral daily          DVT Prophylaxis:  LMWH                   (  )  Heparin SQ           (  )  Coumadin             ( x )  Xarelto                  (  )  Eliquis                   (  )  Venodynes           (  )  Ambulation          (  )  UFH                       (  )  Contraindicated  (  )

## 2018-04-21 NOTE — PROGRESS NOTE ADULT - ASSESSMENT
A/P:   88 yo female with new onset afib/flutter Vidal vasc #5,  embolic CVA    D/W Dr Chambers  Plan:  ::INR with brisk rise from 1.49 to 2.72-----> hold Coumadin tonight  ::UFH stopped, INR therapeutic  ::Daily PT/INR/CBC/BMP  ::To go to rehab  ::Alina b/l

## 2018-04-21 NOTE — PROGRESS NOTE ADULT - SUBJECTIVE AND OBJECTIVE BOX
Patient is a 87y old  Female who presents with a chief complaint of fall and weakness (14 Apr 2018 14:51)  She is 88 Yo Female brought to ER with woke up this morning with Rt side arm and leg weakness went to bed last night at 10 pm. Could not move Rt arm or leg well with difficulty with her speech noted in ED. Not a TPA candidate but stat CT/ cTA performed noted Thrombus in left   MCA and ED physician on called  [facility] stroke team spoke with Dr. Cook and decided not a candidate for Thrombectomy and rec admission at  and further rx. Pt denies of any prior neurological sx and headaches, chest pain, SOB or palpitations. H/o Hypothyroidism takes medication. Lives home alone. Being followed by Dr. Blackburn but doesn't recall when she was seen last.     Examined at bed side, feels better, denies angina, orthopnea, PND, leg swelling, palpitations, syncope. Has no h/o cardiac arrhythmia or CAD.    4/15 - no new complaints. Tele reviewed, AF rate controlled. Agree with BB for rate control. Stable from CV stand point.   Cont current regimen. Start AC when safe from neuro standpoint. Pending vascular surgery consult re: SID. Agree with high intensity statin and ASA. ECHO pending.     4/16-  having intermittent weak sensation in the right hand.   tolerating coumadin. Not on heparin and not therapeutic with the coumadin.   remains in a rate controlled Atrial Fibrillation.  Usually followed for cardiology by Dr Bill at [facility].     4/17-  on IV heparin and loading with coumadin.   rate controlled atrial fibrillation.   spoke to Dr Sanchez. CEA not indicated at this time.     4/18-  not therapeutic on coumadin yet.   tolerating heparin. still with right sided weakness.   decreased appetite and some stomach discomfort. might be constipated. difficult for her to use bedpan and commode is too high for her.     4/20-  last night, had episode of red stool. guiac negative. have resumed coumadin and heparin.   not therapeutic in terms of anticoagulation.  echo shows tight AS    4/21  INR is therapeutic.  claims to be coughing and thinks it is allergy related. no fever and no chills.   will stop heparin today and should transfer to rehab when bed available.   when stronger, will need to consider a TAVR for Aortic Stenosis and possibly a CEA.    Allergies    No Known Allergies    Intolerances        MEDICATIONS  (STANDING):  atorvastatin 80 milliGRAM(s) Oral at bedtime  heparin  Infusion. 900 Unit(s)/Hr (9 mL/Hr) IV Continuous <Continuous>  levothyroxine 100 MICROGram(s) Oral daily  metoprolol tartrate 25 milliGRAM(s) Oral two times a day  polyethylene glycol 3350 17 Gram(s) Oral daily    MEDICATIONS  (PRN):    REVIEW OF SYSTEMS:    RESPIRATORY: No cough, wheezing, hemoptysis; No shortness of breath  CARDIOVASCULAR: No chest pain or palpitations  All other review of systems is negative unless indicated above      PHYSICAL EXAM:  Daily     Daily   Vital Signs Last 24 Hrs  T(C): 36.4 (21 Apr 2018 06:11), Max: 37.3 (20 Apr 2018 11:26)  T(F): 97.5 (21 Apr 2018 06:11), Max: 99.1 (20 Apr 2018 11:26)  HR: 83 (21 Apr 2018 06:11) (76 - 83)  BP: 127/39 (21 Apr 2018 06:11) (101/43 - 134/52)  BP(mean): 57 (21 Apr 2018 06:11) (57 - 57)  RR: 18 (20 Apr 2018 17:18) (18 - 18)  SpO2: 98% (21 Apr 2018 06:11) (98% - 100%)    Constitutional: NAD, awake and alert, well-developed  HEENT: PERR, EOMI, Normal Hearing, MMM  Neck: Soft and supple, No LAD, No JVD  Respiratory: Breath sounds are clear bilaterally, No wheezing, rales or rhonchi  Cardiovascular: S1 and S2, regular rate and rhythm, no Murmurs, gallops or rubs  Gastrointestinal: Bowel Sounds present, soft, nontender, nondistended, no guarding, no rebound  Extremities: No peripheral edema  Vascular: 2+ peripheral pulses  Neurological: A/O x 3, no focal deficits  Musculoskeletal: 5/5 strength b/l upper and lower extremities  Skin: No rashes    LABS: All Labs Reviewed:                        12.7   7.49  )-----------( 381      ( 21 Apr 2018 05:51 )             37.8     04-21    142  |  111<H>  |  19  ----------------------------<  88  3.9   |  24  |  0.71    Ca    8.8      21 Apr 2018 05:51  Mg     2.2     04-21      PT/INR - ( 21 Apr 2018 05:51 )   PT: 30.0 sec;   INR: 2.72 ratio         PTT - ( 21 Apr 2018 01:54 )  PTT:186.6 sec

## 2018-04-21 NOTE — PROGRESS NOTE ADULT - SUBJECTIVE AND OBJECTIVE BOX
cc: fall and weakness    HPI: 87 year old Woman with pmhx HTN, hypothyroidism, scarlet fever as child, h/o osteomyelitis in left elbow and right knee s/p multiple surgeries as child now presenting after having fall at home. Patient states at roughly 4:30 AM on day of admission as she was getting out of bed to go to bathroom, getting dizzy and falling to ground. She reports no head trauma during the event. She states she was on floor for 2 hours, and had difficulty getting up to her feet.   A phone was noted nearby and she called 911. She notes during her time on the floor having noted right arm weakness  and leg weakness that was intermittent but progressively worsening. She notes having falls in the past due to "2 bum knees" and having difficulty getting up due to her joint issues. In ED, it was noted that patient had atrial fibrillation rhythm and CTA head demonstrated occlusion with cut off of the mid left M1 segment with a 1 cm thrombus and high grade stenosis of left carotid artery. [facility] called by ED attending for probable neurovascular intervention but was deemed to be not a candidate by Dr. Cook due to age, lesion location and NIHSS score. She is presently laying in bed, resting comfortably, concerned regarding her arm and leg weakness but otherwise reports no dizziness, headache, vision changes. (14 Apr 2018 14:51)    4/19: No complaints, feeling well. Awaiting therapeutic INR.   4/20: had LBM overnight x5 after taking miralax; had red stool d/t her eating beets for dinner; denies CP/palpitations/SOB; no abd pain/n/v/f/c; concern that she will have to get a new PCP (she's not clear as to why she thinks Paul does not desire to be her PCP)  4/21: States she doesn't feel good but admits it is very non-specific.      Review of system- rest of review of systems are negative except as mentioned in HPI    Vital Signs Last 24 Hrs  T(C): 36.7 (21 Apr 2018 11:14), Max: 36.7 (21 Apr 2018 11:14)  T(F): 98 (21 Apr 2018 11:14), Max: 98 (21 Apr 2018 11:14)  HR: 65 (21 Apr 2018 11:14) (65 - 83)  BP: 110/51 (21 Apr 2018 11:14) (110/51 - 134/52)  BP(mean): 57 (21 Apr 2018 06:11) (57 - 57)  RR: 17 (21 Apr 2018 11:14) (17 - 18)  SpO2: 98% (21 Apr 2018 11:14) (98% - 99%)    PHYSICAL EXAM:    GENERAL: frail, NAD   HEAD:  Atraumatic, Normocephalic  EYES: EOMI, PERRLA, conjunctiva and sclera clear  HEENT: Moist mucous membranes  NECK: Supple, No JVD  NERVOUS SYSTEM:  Alert & Oriented X3,  R side weakness   CHEST/LUNG: Clear to auscultation bilaterally; No rales, rhonchi, wheezing, or rubs  HEART:S1S2 normal  ABDOMEN: Soft, Nontender, Nondistended; Bowel sounds present  GENITOURINARY- Voiding, no palpable bladder  EXTREMITIES:  2+ Peripheral Pulses, No clubbing, cyanosis, or edema  MUSCULOSKELETAL No muscle tenderness, Muscle tone normal, cannot resist or lift RLE  SKIN-no rash, no lesion  PSYCH- very talkative with prolonged conversation, mood stable  LYMPH Node- No palpable lymph node    MEDICATIONS  (STANDING):  atorvastatin 80 milliGRAM(s) Oral at bedtime  levothyroxine 100 MICROGram(s) Oral daily  metoprolol tartrate 25 milliGRAM(s) Oral two times a day    MEDICATIONS  (PRN):                              12.7   7.49  )-----------( 381      ( 21 Apr 2018 05:51 )             37.8     04-21    142  |  111<H>  |  19  ----------------------------<  88  3.9   |  24  |  0.71    Ca    8.8      21 Apr 2018 05:51  Mg     2.2     04-21      CAPILLARY BLOOD GLUCOSE          PT/INR - ( 21 Apr 2018 05:51 )   PT: 30.0 sec;   INR: 2.72 ratio         PTT - ( 21 Apr 2018 01:54 )  PTT:186.6 sec      Assessment and Plan:  87 year old Woman with pmhx HTN, hypothyroidism, scarlet fever as child, h/o osteomyelitis in left elbow and right knee s/p multiple surgeries as child now presenting after having fall at home found to have acute CVA.    Acute CVA:   - con't asa/statin  - echo noted- sever AS and sever MS -out pt management   - stop heparin  - stop coumadin given abrupt rise in INR  - off aspirin for last several day.   - monitor coags closely   - repeat CTH shows subacute CVA.  No definitive bleeding.      Afib- rate controlled  -con't  metoprolol    -monitor daily INR    ICA stensosis:   Dr. Alexander follow up appreciated, out pt management at [facility]     Dispo  - full code  - DC to rehab Monday  - PT / SW for post dc needs    Attending Statement:  40 minutes spent on total encounter; more than 50% of the visit was spent counseling and/or coordinating care by the attending physician.

## 2018-04-21 NOTE — PROGRESS NOTE ADULT - ASSESSMENT
88 y/o F p/w RLE and RUE weakness. Pt states she awoke this morning (went to bed around 10pm) and could not move her arm or leg which prompted her to have EMS bring her to the ER CT/CTA +ve for acute thrombus in left MCA ED Physician Dr. Richardson spoke with Dr. Chase from stroke center he states pt is not a candidate for thrombectomy tba to [facility]     * New onset AF, fairly rate controlled at the moment. CHADSVASC score 7 ( HTN, Age >75, Stroke, Vascular disease, female).  Needs AC when safe from neuro stand point, agree with UFH first and when stroke is stable then possibly with a NOAC.  IF needed for rate control, can start PO diltiazem if able to tolerate oral route, otherwise can use IV drip.  2d echo, tele.    * Left carotid artery stenosis, ipsilateral to stroke and > 70% stenosis, agree with obtaining opinion from  vascular surgery re: CEA vs PTA. Otherwise, cont antiplatelet, statin. This stroke could be embolic from AF but also artery to artery embolization is possible.     4/16-  concerned about her intermittent weakness in the right upper extremity. She is not therapeutic yet on coumadin.   will start IV Heparin while she reaches an adequate INR.  Needs a CEA but need to determine the correct timing. Will have to speak to Dr Sanchez.  physical therapy very important at this time.   baby aspirin 81 mg daily.   continue metoprolol for afib rate control.    4/17-  continue IV heparin while loading with coumadin.   recommend she consult with her cardiologist at [facility], as an outpatient, regarding need for left CEA.  physical therapy  rehab placement when INR is therapeutic.    4/18-  increase coumadin dose to 10 mg daily.   continue IV heparin.   physical therapy.  Miralax daily for bowels.    4/20  resume IV heparin and coumadin  follow  hematocrit  once therapeutic, will need rehab.   once stronger, should speak to her cardiologist at [facility] about TAVR and then carotid surgery.    4/21-  decrease daily coumadin to 5 mg daily.   stop heparin drip.  follow INR  rehab placement.

## 2018-04-22 LAB
HCT VFR BLD CALC: 38.3 % — SIGNIFICANT CHANGE UP (ref 34.5–45)
HGB BLD-MCNC: 12.5 G/DL — SIGNIFICANT CHANGE UP (ref 11.5–15.5)
INR BLD: 2.9 RATIO — HIGH (ref 0.88–1.16)
MCHC RBC-ENTMCNC: 29.4 PG — SIGNIFICANT CHANGE UP (ref 27–34)
MCHC RBC-ENTMCNC: 32.6 GM/DL — SIGNIFICANT CHANGE UP (ref 32–36)
MCV RBC AUTO: 90.1 FL — SIGNIFICANT CHANGE UP (ref 80–100)
NRBC # BLD: 0 /100 WBCS — SIGNIFICANT CHANGE UP (ref 0–0)
PLATELET # BLD AUTO: 380 K/UL — SIGNIFICANT CHANGE UP (ref 150–400)
PROTHROM AB SERPL-ACNC: 32 SEC — HIGH (ref 9.8–12.7)
RBC # BLD: 4.25 M/UL — SIGNIFICANT CHANGE UP (ref 3.8–5.2)
RBC # FLD: 13.3 % — SIGNIFICANT CHANGE UP (ref 10.3–14.5)
WBC # BLD: 9.16 K/UL — SIGNIFICANT CHANGE UP (ref 3.8–10.5)
WBC # FLD AUTO: 9.16 K/UL — SIGNIFICANT CHANGE UP (ref 3.8–10.5)

## 2018-04-22 PROCEDURE — 99233 SBSQ HOSP IP/OBS HIGH 50: CPT

## 2018-04-22 RX ORDER — WARFARIN SODIUM 2.5 MG/1
2.5 TABLET ORAL DAILY
Qty: 0 | Refills: 0 | Status: COMPLETED | OUTPATIENT
Start: 2018-04-22 | End: 2018-04-22

## 2018-04-22 RX ORDER — ACETAMINOPHEN 500 MG
650 TABLET ORAL ONCE
Qty: 0 | Refills: 0 | Status: COMPLETED | OUTPATIENT
Start: 2018-04-22 | End: 2018-04-22

## 2018-04-22 RX ORDER — ASPIRIN/CALCIUM CARB/MAGNESIUM 324 MG
81 TABLET ORAL DAILY
Qty: 0 | Refills: 0 | Status: DISCONTINUED | OUTPATIENT
Start: 2018-04-22 | End: 2018-04-30

## 2018-04-22 RX ADMIN — Medication 650 MILLIGRAM(S): at 21:50

## 2018-04-22 RX ADMIN — WARFARIN SODIUM 2.5 MILLIGRAM(S): 2.5 TABLET ORAL at 21:54

## 2018-04-22 RX ADMIN — Medication 81 MILLIGRAM(S): at 11:48

## 2018-04-22 RX ADMIN — Medication 100 MICROGRAM(S): at 06:10

## 2018-04-22 RX ADMIN — ATORVASTATIN CALCIUM 80 MILLIGRAM(S): 80 TABLET, FILM COATED ORAL at 21:53

## 2018-04-22 RX ADMIN — Medication 25 MILLIGRAM(S): at 17:27

## 2018-04-22 RX ADMIN — Medication 25 MILLIGRAM(S): at 06:10

## 2018-04-22 NOTE — PROGRESS NOTE ADULT - SUBJECTIVE AND OBJECTIVE BOX
Patient is a 87y old  Female who presents with a chief complaint of fall and weakness (14 Apr 2018 14:51)    She is 88 Yo Female brought to ER with woke up this morning with Rt side arm and leg weakness went to bed last night at 10 pm. Could not move Rt arm or leg well with difficulty with her speech noted in ED. Not a TPA candidate but stat CT/ cTA performed noted Thrombus in left   MCA and ED physician on called  [facility] stroke team spoke with Dr. Cook and decided not a candidate for Thrombectomy and rec admission at  and further rx. Pt denies of any prior neurological sx and headaches, chest pain, SOB or palpitations. H/o Hypothyroidism takes medication. Lives home alone. Being followed by Dr. Blackburn but doesn't recall when she was seen last.     Examined at bed side, feels better, denies angina, orthopnea, PND, leg swelling, palpitations, syncope. Has no h/o cardiac arrhythmia or CAD.    4/15 - no new complaints. Tele reviewed, AF rate controlled. Agree with BB for rate control. Stable from CV stand point.   Cont current regimen. Start AC when safe from neuro standpoint. Pending vascular surgery consult re: SID. Agree with high intensity statin and ASA. ECHO pending.     4/16-  having intermittent weak sensation in the right hand.   tolerating coumadin. Not on heparin and not therapeutic with the coumadin.   remains in a rate controlled Atrial Fibrillation.  Usually followed for cardiology by Dr Bill at [facility].     4/17-  on IV heparin and loading with coumadin.   rate controlled atrial fibrillation.   spoke to Dr Sanchez. CEA not indicated at this time.     4/18-  not therapeutic on coumadin yet.   tolerating heparin. still with right sided weakness.   decreased appetite and some stomach discomfort. might be constipated. difficult for her to use bedpan and commode is too high for her.     4/20-  last night, had episode of red stool. guiac negative. have resumed coumadin and heparin.   not therapeutic in terms of anticoagulation.  echo shows tight AS    4/21  INR is therapeutic.  claims to be coughing and thinks it is allergy related. no fever and no chills.   will stop heparin today and should transfer to rehab when bed available.   when stronger, will need to consider a TAVR for Aortic Stenosis and possibly a CEA.    4/22-  INR therapeutic.   ready to go to rehab.    Allergies    No Known Allergies    Intolerances        MEDICATIONS  (STANDING):  aspirin enteric coated 81 milliGRAM(s) Oral daily  atorvastatin 80 milliGRAM(s) Oral at bedtime  levothyroxine 100 MICROGram(s) Oral daily  metoprolol tartrate 25 milliGRAM(s) Oral two times a day  warfarin 2.5 milliGRAM(s) Oral daily    MEDICATIONS  (PRN):    REVIEW OF SYSTEMS:    RESPIRATORY: No cough, wheezing, hemoptysis; No shortness of breath  CARDIOVASCULAR: No chest pain or palpitations  All other review of systems is negative unless indicated above      PHYSICAL EXAM:  Daily     Daily   Vital Signs Last 24 Hrs  T(C): 36.4 (22 Apr 2018 10:56), Max: 37.2 (21 Apr 2018 22:19)  T(F): 97.6 (22 Apr 2018 10:56), Max: 98.9 (21 Apr 2018 22:19)  HR: 80 (22 Apr 2018 10:56) (80 - 86)  BP: 121/59 (22 Apr 2018 10:56) (99/50 - 124/51)  BP(mean): --  RR: 16 (22 Apr 2018 10:56) (16 - 17)  SpO2: 98% (22 Apr 2018 10:56) (97% - 98%)    Constitutional: NAD, awake and alert, well-developed  HEENT: PERR, EOMI, Normal Hearing, MMM  Neck: Soft and supple, No LAD, No JVD  Respiratory: Breath sounds are clear bilaterally, No wheezing, rales or rhonchi  Cardiovascular: S1 and S2, regular rate and rhythm, no Murmurs, gallops or rubs  Gastrointestinal: Bowel Sounds present, soft, nontender, nondistended, no guarding, no rebound  Extremities: No peripheral edema  Vascular: 2+ peripheral pulses  Neurological: A/O x 3, no focal deficits  Musculoskeletal: 5/5 strength b/l upper and lower extremities  Skin: No rashes    LABS: All Labs Reviewed:                        12.5   9.16  )-----------( 380      ( 22 Apr 2018 06:01 )             38.3     04-21    142  |  111<H>  |  19  ----------------------------<  88  3.9   |  24  |  0.71    Ca    8.8      21 Apr 2018 05:51  Mg     2.2     04-21      PT/INR - ( 22 Apr 2018 06:01 )   PT: 32.0 sec;   INR: 2.90 ratio         PTT - ( 21 Apr 2018 01:54 )  PTT:186.6 sec          TELEMETRY/EKG: rate controlled afib

## 2018-04-22 NOTE — PROGRESS NOTE ADULT - SUBJECTIVE AND OBJECTIVE BOX
HPI  HPI:  87 year old female with pmhx HTN, hypothyroidism, scarlet fever as child, h/o osteomyelitis in left elbow and right knee s/p multiple surgeries as child now presenting after having fall at home. Patient states at roughly 4:30 AM this morning as she was getting out of bed to go to bathroom, getting dizzy and falling to ground. She reports no head trauma during the event. She states she was on floor for 2 hours, and had difficulty getting up to her feet. A phone was noted nearby and she called 911. She notes during her time on the floor having noted right arm weakness  and leg weakness that was intermittent but progressively worsening. She notes having falls in the past due to "2 bum knees" and having difficulty getting up due to her joint issues. She states yesterday feeling well and reports no weakness, dizziness, palpitations, chest pain or shortness of breath. In ED, it was noted that patient had atrial fibrillation rhythm and CTA head demonstrated occlusion with cut off of the mid left M1 segment with a 1 cm thrombus and high grade stenosis of left carotid artery. Dr. Cook/[facility] Stroke physician was called by ED attending for probable neurovascular intervention but was deemed to be not a candidate by Dr. Cook due to age, lesion location and NIHSS score. She is presently laying in bed, resting comfortably, concerned regarding her arm and leg weakness but otherwise reports no dizziness, headache, vision changes. (14 Apr 2018 14:51)      Patient is a 87y old  Female who presents with a chief complaint of fall and weakness (14 Apr 2018 14:51)      Consulted by Dr. Chambers  for VTE prophylaxis, risk stratification, and anticoagulation management.    PAST MEDICAL & SURGICAL HISTORY:  Essential hypertension  Chronic multifocal osteomyelitis, other site  Scarlet fever  S/P debridement    CrCl:40.29    IMPROVE VTE Risk Score: #5    [id number] Score: #6    IMPROVE Bleeding Risk Score    Falls Risk:   High (x  )  Mod (  )  Low (  )    4/21: Patient seen at bedside, discussed current INR from 1.49 to 2.72- brisk rise. Will hold coumadin tonight. Patient verbalizes understanding- explains that she doesn't feel well today- tired- and wants to work with PT. Needs OT for hand strength and dexterity.  4/22: Patient seen at bedside- resting comfortable- did work with PT yesterday. Coumadin held yesterday- INR 2.90 this am (rising) therefore will hold again tonight.  Stable, no changes in dispo.    FAMILY HISTORY:  Family history of heart disease (Father, Mother, Sibling)    Denies any personal or familial history of clotting or bleeding disorders.    Allergies    No Known Allergies    Intolerances        REVIEW OF SYSTEMS    (  )Fever	     (  )Constipation	(  )SOB				(  )Headache	(  )Dysuria  (  )Chills	     (  )Melena	(  )Dyspnea present on exertion	                    (  )Dizziness                    (  )Polyuria  (  )Nausea	     (  )Hematochezia	(  )Cough			                    (  )Syncope   	(  )Hematuria  (  )Vomiting    (  )Chest Pain	(  )Wheezing			(  )Weakness  (  )Diarrhea     (  )Palpitations	(  )Anorexia			(  )Myalgia    All other review of systems negative: Yes          PHYSICAL EXAM:    Constitutional: Appears Well    Neurological: A& O x 3, STEELE  RLE <LLE    Skin: Warm    Respiratory and Thorax: normal effort; Breath sounds: normal; No rales/wheezing/rhonchi  	  Cardiovascular: S1, S2, irregular, NMBR	MP Aflutter    Gastrointestinal: BS + x 4Q, nontender	    Genitourinary:  Bladder nondistended, nontender    Musculoskeletal:   General Right:   no muscle/joint tenderness,   normal tone, no joint swelling,   ROM: limited	    General Left:   no muscle/joint tenderness,   normal tone, no joint swelling,   ROM: /full    Lower extrems:   Right: no calf tenderness              negative jojo's sign               + pedal pulses    Left:   no calf tenderness              negative jojo's sign               + pedal pulses                             12.7   7.49  )-----------( 381      ( 21 Apr 2018 05:51 )             37.8       04-21    142  |  111<H>  |  19  ----------------------------<  88  3.9   |  24  |  0.71    Ca    8.8      21 Apr 2018 05:51  Mg     2.2     04-21        PT/INR - ( 21 Apr 2018 05:51 )   PT: 30.0 sec;   INR: 2.72 ratio         PTT - ( 21 Apr 2018 01:54 )  PTT:186.6 sec                       12.5   10.66 )-----------( 405      ( 20 Apr 2018 04:06 )             37.8       04-19    140  |  107  |  20  ----------------------------<  101<H>  4.0   |  26  |  0.75    Ca    8.7      19 Apr 2018 23:55        PT/INR - ( 20 Apr 2018 04:06 )   PT: 16.2 sec;   INR: 1.49 ratio         PTT - ( 20 Apr 2018 04:06 )  PTT:33.1 sec				    MEDICATIONS  (STANDING):  atorvastatin 80 milliGRAM(s) Oral at bedtime  heparin  Infusion. 900 Unit(s)/Hr IV Continuous <Continuous>  levothyroxine 100 MICROGram(s) Oral daily  metoprolol tartrate 25 milliGRAM(s) Oral two times a day  polyethylene glycol 3350 17 Gram(s) Oral daily  warfarin 5 milliGRAM(s) Oral daily          DVT Prophylaxis:  LMWH                   (  )  Heparin SQ           (  )  Coumadin             ( x )  Xarelto                  (  )  Eliquis                   (  )  Venodynes           (  )  Ambulation          (  )  UFH                       (  )  Contraindicated  (  ) HPI  HPI:  87 year old female with pmhx HTN, hypothyroidism, scarlet fever as child, h/o osteomyelitis in left elbow and right knee s/p multiple surgeries as child now presenting after having fall at home. Patient states at roughly 4:30 AM this morning as she was getting out of bed to go to bathroom, getting dizzy and falling to ground. She reports no head trauma during the event. She states she was on floor for 2 hours, and had difficulty getting up to her feet. A phone was noted nearby and she called 911. She notes during her time on the floor having noted right arm weakness  and leg weakness that was intermittent but progressively worsening. She notes having falls in the past due to "2 bum knees" and having difficulty getting up due to her joint issues. She states yesterday feeling well and reports no weakness, dizziness, palpitations, chest pain or shortness of breath. In ED, it was noted that patient had atrial fibrillation rhythm and CTA head demonstrated occlusion with cut off of the mid left M1 segment with a 1 cm thrombus and high grade stenosis of left carotid artery. Dr. Cook/Hugh Stroke physician was called by ED attending for probable neurovascular intervention but was deemed to be not a candidate by Dr. Cook due to age, lesion location and NIHSS score. She is presently laying in bed, resting comfortably, concerned regarding her arm and leg weakness but otherwise reports no dizziness, headache, vision changes. (14 Apr 2018 14:51)      Patient is a 87y old  Female who presents with a chief complaint of fall and weakness (14 Apr 2018 14:51)      Consulted by Dr. Chambers  for VTE prophylaxis, risk stratification, and anticoagulation management.    PAST MEDICAL & SURGICAL HISTORY:  Essential hypertension  Chronic multifocal osteomyelitis, other site  Scarlet fever  S/P debridement    CrCl:40.29    IMPROVE VTE Risk Score: #5    [id number] Score: #6    IMPROVE Bleeding Risk Score    Falls Risk:   High (x  )  Mod (  )  Low (  )    4/21: Patient seen at bedside, discussed current INR from 1.49 to 2.72- brisk rise. Will hold coumadin tonight. Patient verbalizes understanding- explains that she doesn't feel well today- tired- and wants to work with PT. Needs OT for hand strength and dexterity.  4/22: Patient seen at bedside- resting comfortable- did work with PT yesterday. Coumadin held yesterday- INR 2.90 this am (rising) therefore will hold again tonight.  Stable, no changes in dispo.    FAMILY HISTORY:  Family history of heart disease (Father, Mother, Sibling)    Denies any personal or familial history of clotting or bleeding disorders.    Allergies    No Known Allergies    Intolerances        REVIEW OF SYSTEMS    (  )Fever	     (  )Constipation	(  )SOB				(  )Headache	(  )Dysuria  (  )Chills	     (  )Melena	(  )Dyspnea present on exertion	                    (  )Dizziness                    (  )Polyuria  (  )Nausea	     (  )Hematochezia	(  )Cough			                    (  )Syncope   	(  )Hematuria  (  )Vomiting    (  )Chest Pain	(  )Wheezing			(  )Weakness  (  )Diarrhea     (  )Palpitations	(  )Anorexia			(  )Myalgia    All other review of systems negative: Yes          PHYSICAL EXAM:    Constitutional: Appears Well    Neurological: A& O x 3, STEELE  RLE <LLE    Skin: Warm    Respiratory and Thorax: normal effort; Breath sounds: normal; No rales/wheezing/rhonchi  	  Cardiovascular: S1, S2, irregular, NMBR	MP Aflutter    Gastrointestinal: BS + x 4Q, nontender	    Genitourinary:  Bladder nondistended, nontender    Musculoskeletal:   General Right:   no muscle/joint tenderness,   normal tone, no joint swelling,   ROM: limited	    General Left:   no muscle/joint tenderness,   normal tone, no joint swelling,   ROM: /full    Lower extrems:   Right: no calf tenderness              negative jojo's sign               + pedal pulses    Left:   no calf tenderness              negative jojo's sign               + pedal pulses                           12.5   9.16  )-----------( 380      ( 22 Apr 2018 06:01 )             38.3       04-21    142  |  111<H>  |  19  ----------------------------<  88  3.9   |  24  |  0.71    Ca    8.8      21 Apr 2018 05:51  Mg     2.2     04-21        PT/INR - ( 22 Apr 2018 06:01 )   PT: 32.0 sec;   INR: 2.90 ratio    PT/INR - ( 21 Apr 2018 05:51 )   PT: 30.0 sec;   INR: 2.72 ratio    PT/INR - ( 20 Apr 2018 04:06 )   PT: 16.2 sec;   INR: 1.49 ratio         		  MEDICATIONS  (STANDING):  atorvastatin 80 milliGRAM(s) Oral at bedtime  levothyroxine 100 MICROGram(s) Oral daily  metoprolol tartrate 25 milliGRAM(s) Oral two times a day      DVT Prophylaxis:  LMWH                   (  )  Heparin SQ           (  )  Coumadin             ( hold 4/22 )  Xarelto                  (  )  Eliquis                   (  )  Venodynes           (  )  Ambulation          (  )  UFH                       (  )  Contraindicated  (  )    Vital Signs Last 24 Hrs  T(C): 36.6 (04-22-18 @ 05:14), Max: 37.2 (04-21-18 @ 22:19)  T(F): 97.9 (04-22-18 @ 05:14), Max: 98.9 (04-21-18 @ 22:19)  HR: 86 (04-22-18 @ 06:18) (65 - 86)  BP: 124/51 (04-22-18 @ 06:18) (99/50 - 124/51)  BP(mean): --  RR: 17 (04-22-18 @ 05:14) (17 - 17)  SpO2: 97% (04-22-18 @ 05:14) (97% - 98%)

## 2018-04-22 NOTE — CHART NOTE - NSCHARTNOTEFT_GEN_A_CORE
RN to MD calling in regards to left wrist pain on going for the past 5 years. Pt is in NAD resting comfortably and reports the pain is intermittent does not radiate to her arm or her fingers. In addition, pt denies any numbness, tingling, tenderness on palpation. Pt has no further complaints.     T(C): 36.2 (04-22-18 @ 20:09)  T(F): 97.2 (04-22-18 @ 20:09), Max: 98.9 (04-21-18 @ 22:19)  HR: 72 (04-22-18 @ 20:09) (72 - 86)  BP: 115/55 (04-22-18 @ 20:09) (99/50 - 125/57)  RR:  (16 - 17)  SpO2:  (97% - 98%)  Wt(kg): --    PE:   General: NAD, resting comfortably.  Extremities: right wrist - no tenderness on palpation, no pain on flexion, extension. pt is capable of making a fist. no erythema, swelling or trauma noted.    87F admitted to r/o acute CVA c/o of left wrist pain intermittent for past 5 years.  - one time dose tylenol 650mg   - apply hot pack to wrist - pt refused cold ice pack.

## 2018-04-22 NOTE — PROGRESS NOTE ADULT - ASSESSMENT
A/P:   88 yo female with new onset afib/flutter Vidal vasc #5,  embolic CVA    4/22: Due to multiple doses of 10mg coumadin several days back- INR is briskly rising- will hold coumadin again tonight as INR 2.9    Plan:  ::INR with brisk rise from 1.49 to 2.72 (held 4/21) then 2.90 today 4/22-----> hold Coumadin again tonight  ::Daily PT/INR/CBC/BMP  ::To go to rehab  ::Alina b/l

## 2018-04-22 NOTE — PROGRESS NOTE ADULT - SUBJECTIVE AND OBJECTIVE BOX
cc: fall and weakness    HPI: 87 year old Woman with pmhx HTN, hypothyroidism, scarlet fever as child, h/o osteomyelitis in left elbow and right knee s/p multiple surgeries as child now presenting after having fall at home. Patient states at roughly 4:30 AM on day of admission as she was getting out of bed to go to bathroom, getting dizzy and falling to ground. She reports no head trauma during the event. She states she was on floor for 2 hours, and had difficulty getting up to her feet.   A phone was noted nearby and she called 911. She notes during her time on the floor having noted right arm weakness  and leg weakness that was intermittent but progressively worsening. She notes having falls in the past due to "2 bum knees" and having difficulty getting up due to her joint issues. In ED, it was noted that patient had atrial fibrillation rhythm and CTA head demonstrated occlusion with cut off of the mid left M1 segment with a 1 cm thrombus and high grade stenosis of left carotid artery. [facility] by ED attending for probable neurovascular intervention but was deemed to be not a candidate by Dr. Cook due to age, lesion location and NIHSS score. She is presently laying in bed, resting comfortably, concerned regarding her arm and leg weakness but otherwise reports no dizziness, headache, vision changes. (14 Apr 2018 14:51)    4/19: No complaints, feeling well. Awaiting therapeutic INR.   4/20: had LBM overnight x5 after taking miralax; had red stool d/t her eating beets for dinner; denies CP/palpitations/SOB; no abd pain/n/v/f/c; concern that she will have to get a new PCP (she's not clear as to why she thinks Paul does not desire to be her PCP)  4/21: States she doesn't feel good but admits it is very non-specific.    4/22: No new issues, status quo.  INR therapeutic. Awaiting discharge to rehab.    Review of system- rest of review of systems are negative except as mentioned in HPI    Vital Signs Last 24 Hrs  T(C): 36.4 (22 Apr 2018 10:56), Max: 37.2 (21 Apr 2018 22:19)  T(F): 97.6 (22 Apr 2018 10:56), Max: 98.9 (21 Apr 2018 22:19)  HR: 80 (22 Apr 2018 10:56) (80 - 86)  BP: 121/59 (22 Apr 2018 10:56) (99/50 - 124/51)  RR: 16 (22 Apr 2018 10:56) (16 - 17)  SpO2: 98% (22 Apr 2018 10:56) (97% - 98%)    PHYSICAL EXAM:    GENERAL: frail, NAD   HEAD:  Atraumatic, Normocephalic  EYES: EOMI, PERRLA, conjunctiva and sclera clear  HEENT: Moist mucous membranes  NECK: Supple, No JVD  NERVOUS SYSTEM:  Alert & Oriented X3,  R side weakness   CHEST/LUNG: Clear to auscultation bilaterally; No rales, rhonchi, wheezing, or rubs  HEART:S1S2 normal  ABDOMEN: Soft, Nontender, Nondistended; Bowel sounds present  GENITOURINARY- Voiding, no palpable bladder  EXTREMITIES:  2+ Peripheral Pulses, No clubbing, cyanosis, or edema  MUSCULOSKELETAL No muscle tenderness, Muscle tone normal, cannot resist or lift RLE  SKIN-no rash, no lesion  PSYCH- very talkative with prolonged conversation, mood stable  LYMPH Node- No palpable lymph node    MEDICATIONS  (STANDING):  aspirin enteric coated 81 milliGRAM(s) Oral daily  atorvastatin 80 milliGRAM(s) Oral at bedtime  levothyroxine 100 MICROGram(s) Oral daily  metoprolol tartrate 25 milliGRAM(s) Oral two times a day  warfarin 2.5 milliGRAM(s) Oral daily    MEDICATIONS  (PRN):                              12.5   9.16  )-----------( 380      ( 22 Apr 2018 06:01 )             38.3     04-21    142  |  111<H>  |  19  ----------------------------<  88  3.9   |  24  |  0.71    Ca    8.8      21 Apr 2018 05:51  Mg     2.2     04-21      CAPILLARY BLOOD GLUCOSE          PT/INR - ( 22 Apr 2018 06:01 )   PT: 32.0 sec;   INR: 2.90 ratio         PTT - ( 21 Apr 2018 01:54 )  PTT:186.    Assessment and Plan:  87 year old Woman with pmhx HTN, hypothyroidism, scarlet fever as child, h/o osteomyelitis in left elbow and right knee s/p multiple surgeries as child now presenting after having fall at home found to have acute CVA.    Acute CVA:   - con't asa/statin  - echo noted- severe AS and sever MS -out pt management   - stop heparin  - stop coumadin given abrupt rise in INR  - off aspirin, stop indefinitely while on coumadin.  - monitor coags closely   - repeat CTH shows subacute CVA.  No definitive bleeding.      Afib- rate controlled  -con't  metoprolol    -monitor daily INR - currently 2.9.  Hold coumadin tonight.    ICA stensosis:   Dr. Alexander follow up appreciated, out pt management at [facility]     Dispo  - full code  - DC to rehab Monday  - PT / SW for post dc needs    Attending Statement:  40 minutes spent on total encounter; more than 50% of the visit was spent counseling and/or coordinating care by the attending physician.

## 2018-04-22 NOTE — PROGRESS NOTE ADULT - ASSESSMENT
88 y/o F p/w RLE and RUE weakness. Pt states she awoke this morning (went to bed around 10pm) and could not move her arm or leg which prompted her to have EMS bring her to the ER CT/CTA +ve for acute thrombus in left MCA ED Physician Dr. Richardson spoke with Dr. Chase from stroke center he states pt is not a candidate for thrombectomy tba to [facility]     * New onset AF, fairly rate controlled at the moment. CHADSVASC score 7 ( HTN, Age >75, Stroke, Vascular disease, female).  Needs AC when safe from neuro stand point, agree with UFH first and when stroke is stable then possibly with a NOAC.  IF needed for rate control, can start PO diltiazem if able to tolerate oral route, otherwise can use IV drip.  2d echo, tele.    * Left carotid artery stenosis, ipsilateral to stroke and > 70% stenosis, agree with obtaining opinion from  vascular surgery re: CEA vs PTA. Otherwise, cont antiplatelet, statin. This stroke could be embolic from AF but also artery to artery embolization is possible.     4/16-  concerned about her intermittent weakness in the right upper extremity. She is not therapeutic yet on coumadin.   will start IV Heparin while she reaches an adequate INR.  Needs a CEA but need to determine the correct timing. Will have to speak to Dr Sanchez.  physical therapy very important at this time.   baby aspirin 81 mg daily.   continue metoprolol for afib rate control.    4/17-  continue IV heparin while loading with coumadin.   recommend she consult with her cardiologist at [facility], as an outpatient, regarding need for left CEA.  physical therapy  rehab placement when INR is therapeutic.    4/18-  increase coumadin dose to 10 mg daily.   continue IV heparin.   physical therapy.  Miralax daily for bowels.    4/20  resume IV heparin and coumadin  follow  hematocrit  once therapeutic, will need rehab.   once stronger, should speak to her cardiologist at [facility] about TAVR and then carotid surgery.    4/21-  decrease daily coumadin to 5 mg daily.   stop heparin drip.  follow INR  rehab placement.     4/22-  continue coumadin  outpatient assessment of Aortic Stenosis and need for CEA  ready for rehab.

## 2018-04-23 ENCOUNTER — TRANSCRIPTION ENCOUNTER (OUTPATIENT)
Age: 83
End: 2018-04-23

## 2018-04-23 LAB
INR BLD: 1.95 RATIO — HIGH (ref 0.88–1.16)
PROTHROM AB SERPL-ACNC: 21.4 SEC — HIGH (ref 9.8–12.7)

## 2018-04-23 PROCEDURE — 99233 SBSQ HOSP IP/OBS HIGH 50: CPT

## 2018-04-23 PROCEDURE — 73110 X-RAY EXAM OF WRIST: CPT | Mod: 26,LT

## 2018-04-23 RX ORDER — ASPIRIN/CALCIUM CARB/MAGNESIUM 324 MG
1 TABLET ORAL
Qty: 0 | Refills: 0 | DISCHARGE
Start: 2018-04-23

## 2018-04-23 RX ORDER — NEBIVOLOL HYDROCHLORIDE 5 MG/1
0 TABLET ORAL
Qty: 0 | Refills: 0 | COMMUNITY

## 2018-04-23 RX ORDER — ATORVASTATIN CALCIUM 80 MG/1
1 TABLET, FILM COATED ORAL
Qty: 0 | Refills: 0 | DISCHARGE
Start: 2018-04-23

## 2018-04-23 RX ORDER — METOPROLOL TARTRATE 50 MG
1 TABLET ORAL
Qty: 0 | Refills: 0 | DISCHARGE
Start: 2018-04-23

## 2018-04-23 RX ORDER — WARFARIN SODIUM 2.5 MG/1
3 TABLET ORAL
Qty: 90 | Refills: 0 | OUTPATIENT
Start: 2018-04-23 | End: 2018-05-22

## 2018-04-23 RX ORDER — WARFARIN SODIUM 2.5 MG/1
4 TABLET ORAL DAILY
Qty: 0 | Refills: 0 | Status: DISCONTINUED | OUTPATIENT
Start: 2018-04-23 | End: 2018-04-24

## 2018-04-23 RX ADMIN — ATORVASTATIN CALCIUM 80 MILLIGRAM(S): 80 TABLET, FILM COATED ORAL at 22:17

## 2018-04-23 RX ADMIN — Medication 25 MILLIGRAM(S): at 18:51

## 2018-04-23 RX ADMIN — Medication 100 MICROGRAM(S): at 06:20

## 2018-04-23 RX ADMIN — WARFARIN SODIUM 4 MILLIGRAM(S): 2.5 TABLET ORAL at 22:17

## 2018-04-23 RX ADMIN — Medication 25 MILLIGRAM(S): at 06:20

## 2018-04-23 RX ADMIN — Medication 81 MILLIGRAM(S): at 13:02

## 2018-04-23 NOTE — DISCHARGE NOTE ADULT - CARE PROVIDERS DIRECT ADDRESSES
[email or web address]

## 2018-04-23 NOTE — CHART NOTE - NSCHARTNOTEFT_GEN_A_CORE
Assessment: Nutrition follow up   Acute CVA:   - con't asa/statin  - echo noted- severe AS and sever MS -out pt management   - stop heparin  - stop coumadin given abrupt rise in INR  - off aspirin, stop indefinitely while on coumadin.  - monitor coags closely   - repeat CTH shows subacute CVA.  No definitive bleeding.    Afib- rate controlled  -con't  metoprolol    -monitor daily INR - currently 2.9.  Hold coumadin tonight.  ICA stensosis:  Dispo  - full code  - DC to rehab Monday  - PT / SW for post dc needs    Pt remains with fair po intake however appetite improving at this time. Tolerating po diet well with no nausea/emesis. Denies any difficulty chewing or swallowing. Suggest liberalizing diet to low sodium restriction only 2/2 to advancing age. No recent weights documented. Will continue with current POC.         Recommendations:  1) change diet to low sodium  2) obtain and monitor weights  3) Encourage pt to order meals at  for food preferences.       Diet Presciption: Diet, DASH/TLC:   Sodium & Cholesterol Restricted (04-14-18 @ 15:40)      Wt Hx: no recent weights   Height (cm): 152.4 (04-14-18 @ 12:46)  Weight (kg): 48.3 (04-14-18 @ 12:46)  BMI (kg/m2): 20.8 (04-14-18 @ 12:46)      · Weight  (lbs) 105.1 lb  · Weight (kg) 47.6 kg  · From (25cal/kg) 1190 To (30cal/kg)1428 kcalories   From (1 g/kg) 47.6 To (1.2 g/kg) 57.12 gm protein     Estimated Needs:   [x ] no change since previous assessment    · Nutrition Diagnostic Terminology #1: Nutrient  · Nutrient: Malnutrition; pt meets criteria for moderate malnutrition in context of social circumstances  · Etiology: variable po intake related to decreased appetite, difficulty obtaining groceries/preparing food  · Signs/Symptoms: po intake meeting <75% ENN x5days, 5# (4.8%) wt loss, moderate muscle wasting, moderate fat loss  · Nutrition Intervention: Nutrition Education; Collaboration and Referral of Nutrition Care  · Nutrition Education - Content: Other (specify); encouraged po intake, coumadin education  · Collaboration and Referral of Nutrition Care: Collaboration with other providers; SW  · Goal/Expected Outcome: pt po intake meeting >80% ENN, reduce s/s malnutrition      Nutrition Diagnosis is [ x] ongoing  [ ] resolved [ ] not applicable       Pertinent Medications: MEDICATIONS  (STANDING):  aspirin enteric coated 81 milliGRAM(s) Oral daily  atorvastatin 80 milliGRAM(s) Oral at bedtime  levothyroxine 100 MICROGram(s) Oral daily  metoprolol tartrate 25 milliGRAM(s) Oral two times a day    MEDICATIONS  (PRN):    Pertinent Labs: 04-21 Na142 mmol/L Glu 88 mg/dL K+ 3.9 mmol/L Cr  0.71 mg/dL BUN 19 mg/dL 04-15 [id number] 5.3 % 04-15 Chol 191 mg/dL  mg/dL HDL 47 mg/dL Trig 93 mg/dL          Skin: ying score = 20  No skin breakdown at this time.           Monitoring and Evaluation:   [x] PO intake/Nutr support infusion [ x ] Tolerance to Nutr [ x ] weights [ x ] labs[ x ] follow up per protocol  [ ] other:

## 2018-04-23 NOTE — DISCHARGE NOTE ADULT - MEDICATION SUMMARY - MEDICATIONS TO TAKE
I will START or STAY ON the medications listed below when I get home from the hospital:    aspirin 81 mg oral delayed release tablet  -- 1 tab(s) by mouth once a day  -- Indication: For CEREBRAL VASCULAR ACCIDENT    warfarin 1 mg oral tablet  -- 3 tab(s) by mouth once a day (at bedtime) MDD:DOSE ADJUSTMENT BY REHAB MEDICAL STAFF PER INR DAILY  -- Indication: For CVA (cerebral vascular accident)    atorvastatin 80 mg oral tablet  -- 1 tab(s) by mouth once a day (at bedtime)  -- Indication: For CVA (cerebral vascular accident)    metoprolol tartrate 25 mg oral tablet  -- 1 tab(s) by mouth 2 times a day  -- Indication: For Essential hypertension    levothyroxine 125 mcg (0.125 mg) oral tablet  -- 1 tab(s) by mouth once a day  -- Indication: For hypothyroid I will START or STAY ON the medications listed below when I get home from the hospital:    aspirin 81 mg oral delayed release tablet  -- 1 tab(s) by mouth once a day  -- Indication: For CEREBRAL VASCULAR ACCIDENT    warfarin 1 mg oral tablet  -- 1 tab(s) by mouth once  -- 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    atorvastatin 80 mg oral tablet  -- 1 tab(s) by mouth once a day (at bedtime)  -- Indication: For CVA (cerebral vascular accident)    metoprolol tartrate 25 mg oral tablet  -- 1 tab(s) by mouth 2 times a day  -- Indication: For Essential hypertension    albuterol 2.5 mg/3 mL (0.083%) inhalation solution  -- 1 dose(s) inhaled every 6 hours  -- Indication: For pneumonia    albuterol 90 mcg/inh inhalation aerosol  -- 1 puff(s) inhaled every 4 hours  -- Indication: For pneumonia    amoxicillin-clavulanate 500 mg-125 mg oral tablet  -- 1 tab(s) by mouth every 12 hours 6 days  -- Indication: For pneumonia    levothyroxine 125 mcg (0.125 mg) oral tablet  -- 1 tab(s) by mouth once a day  -- Indication: For hypothyroid

## 2018-04-23 NOTE — DISCHARGE NOTE ADULT - HOSPITAL COURSE
87 year old Woman with pmhx HTN, hypothyroidism, scarlet fever as child, h/o osteomyelitis in left elbow and right knee s/p multiple surgeries as child now presenting after having fall at home. Patient states at roughly 4:30 AM on day of admission as she was getting out of bed to go to bathroom, getting dizzy and falling to ground. She reports no head trauma during the event. She states she was on floor for 2 hours, and had difficulty getting up to her feet.    In ED, it was noted that patient had atrial fibrillation rhythm and CTA head demonstrated occlusion with cut off of the mid left M1 segment with a 1 cm thrombus and high grade stenosis of left carotid artery. Dr. Cook/Hugh Stroke physician was called by ED attending for probable neurovascular intervention but was deemed to be not a candidate by Dr. Cook due to age, lesion location and NIHSS score.     Today remains stable with multiple chronic complaints, L elbow pain, sneezing; denies neurological changes since admission. CP free, no palpitation     PHYSICAL EXAM:    GENERAL: frail, NAD   HEAD:  Atraumatic, Normocephalic  EYES: EOMI, PERRLA, conjunctiva and sclera clear  HEENT: Moist mucous membranes  NECK: Supple, No JVD  NERVOUS SYSTEM:  Alert & Oriented X3,  R side weakness   CHEST/LUNG: Clear to auscultation bilaterally; No rales, rhonchi, wheezing, or rubs  HEART:S1S2 normal  ABDOMEN: Soft, Nontender, Nondistended; Bowel sounds present  GENITOURINARY- Voiding, no palpable bladder  EXTREMITIES:  2+ Peripheral Pulses, No clubbing, cyanosis, or edema  MUSCULOSKELETAL No muscle tenderness, Muscle tone normal, cannot resist or lift RLE; LUE / LLE at baseline  SKIN-no rash, no lesion  PSYCH- very talkative with prolonged conversation, mood stable  LYMPH Node- No palpable lymph node    87 year old Woman with pmhx HTN, hypothyroidism, scarlet fever as child, h/o osteomyelitis in left elbow and right knee s/p multiple surgeries as child now presenting after having fall at home found to have acute CVA, admitted and treated for:     Acute CVA:   - con't asa/statin  - echo noted- severe AS and sever MS -out pt management   - con't coumadin / dose per INR  - off aspirin, stop indefinitely while on coumadin.  - monitor coags closely   - repeat CTH shows subacute CVA.  No definitive bleeding.      Afib- rate controlled  -con't  metoprolol    -monitor INR out patient to maintained therapeutic goal of 2.0 - 3.0    ICA stensosis:   Dr. Alexander follow up appreciated, out pt management at [facility]     Dispo  - full code  - stable for discharge to rehab 87 year old Woman with pmhx HTN, hypothyroidism, scarlet fever as child, h/o osteomyelitis in left elbow and right knee s/p multiple surgeries as child now presenting after having fall at home. Patient states at roughly 4:30 AM on day of admission as she was getting out of bed to go to bathroom, getting dizzy and falling to ground. She reports no head trauma during the event. She states she was on floor for 2 hours, and had difficulty getting up to her feet.    In ED, it was noted that patient had atrial fibrillation rhythm and CTA head demonstrated occlusion with cut off of the mid left M1 segment with a 1 cm thrombus and high grade stenosis of left carotid artery. Dr. Cook/Hugh Stroke physician was called by ED attending for probable neurovascular intervention but was deemed to be not a candidate by Dr. Cook due to age, lesion location and NIHSS score.     Today remains stable with multiple chronic complaints, L elbow pain, sneezing; denies neurological changes since admission. CP free, no palpitation     REVIEW OF SYSTEMS: All other review of systems is negative unless indicated above.    PHYSICAL EXAM:    GENERAL: frail, NAD   HEAD:  Atraumatic, Normocephalic  EYES: EOMI, PERRLA, conjunctiva and sclera clear  HEENT: Moist mucous membranes  NECK: Supple, No JVD  NERVOUS SYSTEM:  Alert & Oriented X3,  R side weakness   CHEST/LUNG: Clear to auscultation bilaterally; No rales, rhonchi, wheezing, or rubs  HEART:S1S2 normal  ABDOMEN: Soft, Nontender, Nondistended; Bowel sounds present  GENITOURINARY- Voiding, no palpable bladder  EXTREMITIES:  2+ Peripheral Pulses, No clubbing, cyanosis, or edema  MUSCULOSKELETAL No muscle tenderness, Muscle tone normal, cannot resist or lift RLE; LUE / LLE at baseline  SKIN-no rash, no lesion  PSYCH- very talkative with prolonged conversation, mood stable  LYMPH Node- No palpable lymph node    87 year old Woman with pmhx HTN, hypothyroidism, scarlet fever as child, h/o osteomyelitis in left elbow and right knee s/p multiple surgeries as child now presenting after having fall at home found to have acute CVA, admitted and treated for:     Acute CVA:   - con't asa/statin  - echo noted- severe AS and sever MS -out pt management   - con't coumadin / dose per INR  - off aspirin, stop indefinitely while on coumadin.  - monitor coags closely   - repeat CTH shows subacute CVA.  No definitive bleeding.      Afib- rate controlled  -con't  metoprolol    -monitor INR out patient to maintained therapeutic goal of 2.0 - 3.0    ICA stensosis:   Dr. Alexander follow up appreciated, out pt management at [facility]     Dispo  - full code  - stable for discharge to rehab    Attending Statement:  40 minutes spent on total encounter; more than 50% of the visit was spent counseling and/or coordinating care by the attending physician.  Patient seen and examined with KVNG Lane.  Agree with physical exam and assessment and plan. 87 year old Woman with pmhx HTN, hypothyroidism, scarlet fever as child, h/o osteomyelitis in left elbow and right knee s/p multiple surgeries as child now presenting after having fall at home. Patient states at roughly 4:30 AM on day of admission as she was getting out of bed to go to bathroom, getting dizzy and falling to ground. She reports no head trauma during the event. She states she was on floor for 2 hours, and had difficulty getting up to her feet.  In ED, it was noted that patient had atrial fibrillation rhythm and CTA head demonstrated occlusion with cut off of the mid left M1 segment with a 1 cm thrombus and high grade stenosis of left carotid artery. Dr. Cook/Hugh Stroke physician was called by ED attending for probable neurovascular intervention but was deemed to be not a candidate by Dr. Cook due to age, lesion location and NIHSS score.     Discharge addendum: Patient was not able to discharge due to multifocal pneumonia. She was started on IV zosyn, clinically much improved.     REVIEW OF SYSTEMS: All other review of systems is negative unless indicated above.    Vital Signs Last 24 Hrs  T(C): 36.2 (30 Apr 2018 11:02), Max: 36.6 (29 Apr 2018 16:32)  T(F): 97.2 (30 Apr 2018 11:02), Max: 97.9 (29 Apr 2018 16:32)  HR: 88 (30 Apr 2018 11:02) (68 - 89)  BP: 103/68 (30 Apr 2018 11:02) (102/43 - 117/50)  BP(mean): --  RR: 17 (30 Apr 2018 11:02) (16 - 18)  SpO2: 98% (30 Apr 2018 11:02) (98% - 100%)    PHYSICAL EXAM:    GENERAL: frail, NAD   HEAD:  Atraumatic, Normocephalic  EYES: EOMI, PERRLA, conjunctiva and sclera clear  HEENT: Moist mucous membranes  NECK: Supple, No JVD  NERVOUS SYSTEM:  Alert & Oriented X3,  R side weakness   CHEST/LUNG: Clear to auscultation bilaterally; No rales, rhonchi, wheezing, or rubs  HEART:S1S2 normal  ABDOMEN: Soft, Nontender, Nondistended; Bowel sounds present  GENITOURINARY- Voiding, no palpable bladder  EXTREMITIES:  2+ Peripheral Pulses, No clubbing, cyanosis, or edema  MUSCULOSKELETAL No muscle tenderness, Muscle tone normal, cannot resist or lift RLE; LUE / LLE at baseline  SKIN-no rash, no lesion  PSYCH- very talkative with prolonged conversation, mood stable  LYMPH Node- No palpable lymph node    87 year old Woman with pmhx HTN, hypothyroidism, scarlet fever as child, h/o osteomyelitis in left elbow and right knee s/p multiple surgeries as child now presenting after having fall at home found to have acute CVA, admitted and treated for:     Acute CVA:   - con't asa/statin  - echo noted- severe AS and sever MS -out pt management   - con't coumadin / dose per INR  - off aspirin, stop indefinitely while on coumadin.  - monitor coags closely   - repeat CTH shows subacute CVA.  No definitive bleeding.      Afib- rate controlled  -con't  metoprolol    -monitor INR out patient to maintained therapeutic goal of 2.0 - 3.0    ICA stensosis:   Dr. Alexander follow up appreciated, out pt management at [facility]     Multifocal pneumonia  S/P IV zosyn, change to po augmentin for another 6 days.     Dispo  - full code  - stable for discharge to rehab  -Spent more than 30 minutes to prepare the discharge  Her PMD was notified.

## 2018-04-23 NOTE — DISCHARGE NOTE ADULT - OTHER SIGNIFICANT FINDINGS
< from: CT Head No Cont (04.20.18 @ 16:57) >  mild periventricular white matter ischemia. Small subacute   infarction in the LEFT basal ganglia is noted with suggestion of faint   internal hyperdensity which may reflect a punctate focus of hemorrhage.       < end of copied text >    < from: CT Head No Cont (04.16.18 @ 23:12) >  IMPRESSION:   Very small subacute infarct in the left posterior basal gangliais   stable.     < end of copied text >    < from: Transthoracic Echocardiogram (04.16.18 @ 11:40) >   Summary     Severe mitral stenosis is present.   Moderate (2+) mitral regurgitation is present.   Severe mitral annular calcification is present.   Significant fibrocalcific changes noted to the aortic valve leaflets with   restriction in leaflet excursion. Peak transaortic gradient is 68 mmHg;   this finding is consistent with severe aortic stenosis.   Mild (1+) aortic regurgitation is present.   Normal appearing tricuspid valve structure and function.   Mild to moderate tricuspid valve regurgitation is present.   The left atrium is severely dilated.   The left ventricle is normal in size, wall thickness, wall motion and   contractility.  Estimated left ventricular ejection fraction is 55-60 %.   Normal appearing right ventricle structure and function.   No evidence of pericardial effusion.    < end of copied text >

## 2018-04-23 NOTE — CHART NOTE - NSCHARTNOTEFT_GEN_A_CORE
Upon Nutritional Assessment by the Registered Dietitian your patient was determined to meet criteria / has evidence of the following diagnosis/diagnoses:          [ ]  Mild Protein Calorie Malnutrition        [x ]  Moderate Protein Calorie Malnutrition        [ ] Severe Protein Calorie Malnutrition        [ ] Unspecified Protein Calorie Malnutrition        [ ] Underweight / BMI <19        [ ] Morbid Obesity / BMI > 40      Findings as based on:  •  Comprehensive nutrition assessment and consultation  •  Calorie counts (nutrient intake analysis)  •  Food acceptance and intake status from observations by staff  •  Follow up  •  Patient education  •  Intervention secondary to interdisciplinary rounds  •   concerns      Treatment:    1) Recommend SW consult   2) Add MVI/minerals to ensure 100% RDI met   3) Encourage po intake meeting >80% estimated nutrition needs   4) Obtain current wt. Will continue to monitor po intake, wt, labs  5) Liberalize diet to Low sodium diet (d/c fat/cholesterol restriction 2/2 to advancing age)    The following diet has been recommended:    Low sodium diet   PROVIDER Section:     By signing this assessment you are acknowledging and agree with the diagnosis/diagnoses assigned by the Registered Dietitian    Comments:

## 2018-04-23 NOTE — PROGRESS NOTE ADULT - ASSESSMENT
A/P:   88 yo female with new onset afib/flutter Vidal vasc #5,  embolic CVA    4/22: Due to multiple doses of 10mg coumadin several days back- INR is briskly rising- will hold coumadin again tonight as INR 2.9    Plan:  ::INR with brisk rise from 1.49 to 2.72 (held 4/21) then 2.90 today 4/22----->inr 1.95 today will resume at 4mg tonight.  ::Daily PT/INR/CBC/BMP  ::To go to rehab  ::[facility] b/l  will f/u

## 2018-04-23 NOTE — DISCHARGE NOTE ADULT - CARE PLAN
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

## 2018-04-23 NOTE — DISCHARGE NOTE ADULT - PATIENT PORTAL LINK FT
You can access the [facility] Patient Portal, offered by [facility], by registering with the following website: [email or web address]

## 2018-04-23 NOTE — DISCHARGE NOTE ADULT - PLAN OF CARE
recovery back to baseline PT / OT at acute rehab  take your medications as prescribe (coumadin for stroke risk reduction; and metoprolol to control AFIB) optimal blood pressure control Take your medications as prescribed stable Follow up with Dr Brower after discharge from acute rehab

## 2018-04-23 NOTE — DISCHARGE NOTE ADULT - ADDITIONAL INSTRUCTIONS
to be followed by medical team while at acute rehab    Follow up with neurology and your primary care provider within 1 week after discharge from acute rehab

## 2018-04-23 NOTE — DISCHARGE NOTE ADULT - CARE PROVIDER_API CALL
Madeline Blackburn), Cardiovascular Disease; Internal Medicine  [address] [place] [address]  Phone: [phone]  Fax: [phone]    Dafne Dsouza), Neurology  General  [address] [facility]  [address] [place] [address]  Phone: [phone]  Fax: [phone]

## 2018-04-23 NOTE — PROGRESS NOTE ADULT - SUBJECTIVE AND OBJECTIVE BOX
HPI  HPI:  87 year old female with pmhx HTN, hypothyroidism, scarlet fever as child, h/o osteomyelitis in left elbow and right knee s/p multiple surgeries as child now presenting after having fall at home. Patient states at roughly 4:30 AM this morning as she was getting out of bed to go to bathroom, getting dizzy and falling to ground. She reports no head trauma during the event. She states she was on floor for 2 hours, and had difficulty getting up to her feet. A phone was noted nearby and she called 911. She notes during her time on the floor having noted right arm weakness  and leg weakness that was intermittent but progressively worsening. She notes having falls in the past due to "2 bum knees" and having difficulty getting up due to her joint issues. She states yesterday feeling well and reports no weakness, dizziness, palpitations, chest pain or shortness of breath. In ED, it was noted that patient had atrial fibrillation rhythm and CTA head demonstrated occlusion with cut off of the mid left M1 segment with a 1 cm thrombus and high grade stenosis of left carotid artery. Dr. Cook/[facility] Stroke physician was called by ED attending for probable neurovascular intervention but was deemed to be not a candidate by Dr. Cook due to age, lesion location and NIHSS score. She is presently laying in bed, resting comfortably, concerned regarding her arm and leg weakness but otherwise reports no dizziness, headache, vision changes. (14 Apr 2018 14:51)      Patient is a 87y old  Female who presents with a chief complaint of fall and weakness (14 Apr 2018 14:51)      Consulted by Dr. Chambers  for VTE prophylaxis, risk stratification, and anticoagulation management.    PAST MEDICAL & SURGICAL HISTORY:  Essential hypertension  Chronic multifocal osteomyelitis, other site  Scarlet fever  S/P debridement    CrCl:40.29    IMPROVE VTE Risk Score: #5    [id number] Score: #6    IMPROVE Bleeding Risk Score    Falls Risk:   High (x  )  Mod (  )  Low (  )    4/21: Patient seen at bedside, discussed current INR from 1.49 to 2.72- brisk rise. Will hold coumadin tonight. Patient verbalizes understanding- explains that she doesn't feel well today- tired- and wants to work with PT. Needs OT for hand strength and dexterity.  4/22: Patient seen at bedside- resting comfortable- did work with PT yesterday. Coumadin held yesterday- INR 2.90 this am (rising) therefore will hold again tonight.  Stable, no changes in dispo.  4-23-18 Pt seen at bedside. discussed her anticoagulation with coumadin, questions answered.  Pt concerned about going to rehab.     FAMILY HISTORY:  Family history of heart disease (Father, Mother, Sibling)    Denies any personal or familial history of clotting or bleeding disorders.    Allergies    No Known Allergies    Intolerances        REVIEW OF SYSTEMS    (  )Fever	     (  )Constipation	(  )SOB				(  )Headache	(  )Dysuria  (  )Chills	     (  )Melena	(  )Dyspnea present on exertion	                    (  )Dizziness                    (  )Polyuria  (  )Nausea	     (  )Hematochezia	(  )Cough			                    (  )Syncope   	(  )Hematuria  (  )Vomiting    (  )Chest Pain	(  )Wheezing			(  )Weakness  (  )Diarrhea     (  )Palpitations	(  )Anorexia			(  )Myalgia    All other review of systems negative: Yes          PHYSICAL EXAM:    Constitutional: Appears Well    Neurological: A& O x 3, STEELE  RLE <LLE    Skin: Warm    Respiratory and Thorax: normal effort; Breath sounds: normal; No rales/wheezing/rhonchi  	  Cardiovascular: S1, S2, irregular, NMBR	MP Aflutter    Gastrointestinal: BS + x 4Q, nontender	    Genitourinary:  Bladder nondistended, nontender    Musculoskeletal:   General Right:   no muscle/joint tenderness,   normal tone, no joint swelling,   ROM: limited	    General Left:   no muscle/joint tenderness,   normal tone, no joint swelling,   ROM: /full    Lower extrems:   Right: no calf tenderness              negative jojo's sign               + pedal pulses    Left:   no calf tenderness              negative jojo's sign               + pedal pulses                           12.5   9.16  )-----------( 380      ( 22 Apr 2018 06:01 )             38.3       PT/INR - ( 23 Apr 2018 06:40 )   PT: 21.4 sec;   INR: 1.95 ratio                               12.5   9.16  )-----------( 380      ( 22 Apr 2018 06:01 )             38.3       04-21    142  |  111<H>  |  19  ----------------------------<  88  3.9   |  24  |  0.71    Ca    8.8      21 Apr 2018 05:51  Mg     2.2     04-21      PT/INR - ( 23 Apr 2018 06:40 )   PT: 21.4 sec;   INR: 1.95 ratio  PT/INR - ( 22 Apr 2018 06:01 )   PT: 32.0 sec;   INR: 2.90 ratio    PT/INR - ( 21 Apr 2018 05:51 )   PT: 30.0 sec;   INR: 2.72 ratio    PT/INR - ( 20 Apr 2018 04:06 )   PT: 16.2 sec;   INR: 1.49 ratio         MEDICATIONS  (STANDING):  aspirin enteric coated 81 milliGRAM(s) Oral daily  atorvastatin 80 milliGRAM(s) Oral at bedtime  levothyroxine 100 MICROGram(s) Oral daily  metoprolol tartrate 25 milliGRAM(s) Oral two times a day  warfarin 4 milliGRAM(s) Oral daily        DVT Prophylaxis:  LMWH                   (  )  Heparin SQ           (  )  Coumadin             ( x )  Xarelto                  (  )  Eliquis                   (  )  Venodynes           (x  )  Ambulation          (x  )  UFH                       (  )  Contraindicated  (  )    Vital Signs Last 24 Hrs  T(C): 36.8 (04-23-18 @ 11:26), Max: 36.8 (04-23-18 @ 11:26)  T(F): 98.3 (04-23-18 @ 11:26), Max: 98.3 (04-23-18 @ 11:26)  HR: 81 (04-23-18 @ 11:26) (72 - 83)  BP: 102/43 (04-23-18 @ 11:26) (102/43 - 125/57)  BP(mean): --  RR: 18 (04-23-18 @ 11:26) (17 - 18)  SpO2: 99% (04-23-18 @ 11:26) (97% - 99%)

## 2018-04-24 LAB
ANION GAP SERPL CALC-SCNC: 9 MMOL/L — SIGNIFICANT CHANGE UP (ref 5–17)
BUN SERPL-MCNC: 14 MG/DL — SIGNIFICANT CHANGE UP (ref 7–23)
CALCIUM SERPL-MCNC: 8.7 MG/DL — SIGNIFICANT CHANGE UP (ref 8.5–10.1)
CHLORIDE SERPL-SCNC: 105 MMOL/L — SIGNIFICANT CHANGE UP (ref 96–108)
CO2 SERPL-SCNC: 26 MMOL/L — SIGNIFICANT CHANGE UP (ref 22–31)
CREAT SERPL-MCNC: 0.6 MG/DL — SIGNIFICANT CHANGE UP (ref 0.5–1.3)
GLUCOSE SERPL-MCNC: 92 MG/DL — SIGNIFICANT CHANGE UP (ref 70–99)
INR BLD: 1.63 RATIO — HIGH (ref 0.88–1.16)
POTASSIUM SERPL-MCNC: 3.7 MMOL/L — SIGNIFICANT CHANGE UP (ref 3.5–5.3)
POTASSIUM SERPL-SCNC: 3.7 MMOL/L — SIGNIFICANT CHANGE UP (ref 3.5–5.3)
PROTHROM AB SERPL-ACNC: 17.8 SEC — HIGH (ref 9.8–12.7)
SODIUM SERPL-SCNC: 140 MMOL/L — SIGNIFICANT CHANGE UP (ref 135–145)

## 2018-04-24 PROCEDURE — 99233 SBSQ HOSP IP/OBS HIGH 50: CPT

## 2018-04-24 PROCEDURE — 71045 X-RAY EXAM CHEST 1 VIEW: CPT | Mod: 26

## 2018-04-24 RX ORDER — WARFARIN SODIUM 2.5 MG/1
7.5 TABLET ORAL ONCE
Qty: 0 | Refills: 0 | Status: COMPLETED | OUTPATIENT
Start: 2018-04-24 | End: 2018-04-24

## 2018-04-24 RX ORDER — ENOXAPARIN SODIUM 100 MG/ML
40 INJECTION SUBCUTANEOUS DAILY
Qty: 0 | Refills: 0 | Status: DISCONTINUED | OUTPATIENT
Start: 2018-04-24 | End: 2018-04-27

## 2018-04-24 RX ADMIN — Medication 25 MILLIGRAM(S): at 05:51

## 2018-04-24 RX ADMIN — Medication 200 MILLIGRAM(S): at 03:12

## 2018-04-24 RX ADMIN — WARFARIN SODIUM 7.5 MILLIGRAM(S): 2.5 TABLET ORAL at 22:19

## 2018-04-24 RX ADMIN — Medication 25 MILLIGRAM(S): at 22:19

## 2018-04-24 RX ADMIN — Medication 81 MILLIGRAM(S): at 14:00

## 2018-04-24 RX ADMIN — Medication 200 MILLIGRAM(S): at 22:18

## 2018-04-24 RX ADMIN — Medication 100 MICROGRAM(S): at 05:51

## 2018-04-24 RX ADMIN — ENOXAPARIN SODIUM 40 MILLIGRAM(S): 100 INJECTION SUBCUTANEOUS at 14:01

## 2018-04-24 RX ADMIN — Medication 200 MILLIGRAM(S): at 09:50

## 2018-04-24 RX ADMIN — ATORVASTATIN CALCIUM 80 MILLIGRAM(S): 80 TABLET, FILM COATED ORAL at 22:20

## 2018-04-24 RX ADMIN — Medication 200 MILLIGRAM(S): at 16:30

## 2018-04-24 NOTE — PROGRESS NOTE ADULT - ASSESSMENT
Assessment and Plan:  87 year old Woman with pmhx HTN, hypothyroidism, scarlet fever as child, h/o osteomyelitis in left elbow and right knee s/p multiple surgeries as child now presenting after having fall at home found to have acute CVA.    Acute CVA:   - con't asa/statin  - echo noted- severe AS and sever MS -out pt management   - off heparin  - coumadin subtherapeutic in the setting of being held 1 day d/t rapid INR increase  - f/b anticoag team: dose with lovenox x 1 today and coumadin 7. 5 mg  - off aspirin, stop indefinitely while on coumadin.  - monitor coags closely   - repeat CTH shows subacute CVA.  No definitive bleeding.      Afib- rate controlled  -con't  metoprolol    -monitor daily INR     Cough  - cxr to r/o acute pathology  - she high risk for PNA    ICA stensosis:   Dr. Alexander follow up appreciated, out pt management at [facility]     Dispo  - full code  - DC to rehab   - PT / SW for post dc needs

## 2018-04-24 NOTE — PROGRESS NOTE ADULT - SUBJECTIVE AND OBJECTIVE BOX
HPI  HPI:  87 year old female with pmhx HTN, hypothyroidism, scarlet fever as child, h/o osteomyelitis in left elbow and right knee s/p multiple surgeries as child now presenting after having fall at home. Patient states at roughly 4:30 AM this morning as she was getting out of bed to go to bathroom, getting dizzy and falling to ground. She reports no head trauma during the event. She states she was on floor for 2 hours, and had difficulty getting up to her feet. A phone was noted nearby and she called 911. She notes during her time on the floor having noted right arm weakness  and leg weakness that was intermittent but progressively worsening. She notes having falls in the past due to "2 bum knees" and having difficulty getting up due to her joint issues. She states yesterday feeling well and reports no weakness, dizziness, palpitations, chest pain or shortness of breath. In ED, it was noted that patient had atrial fibrillation rhythm and CTA head demonstrated occlusion with cut off of the mid left M1 segment with a 1 cm thrombus and high grade stenosis of left carotid artery. Dr. Cook/[facility] Stroke physician was called by ED attending for probable neurovascular intervention but was deemed to be not a candidate by Dr. Cook due to age, lesion location and NIHSS score. She is presently laying in bed, resting comfortably, concerned regarding her arm and leg weakness but otherwise reports no dizziness, headache, vision changes. (14 Apr 2018 14:51)      Patient is a 87y old  Female who presents with a chief complaint of fall and weakness (14 Apr 2018 14:51)      Consulted by Dr. Chambers  for VTE prophylaxis, risk stratification, and anticoagulation management.    PAST MEDICAL & SURGICAL HISTORY:  Essential hypertension  Chronic multifocal osteomyelitis, other site  Scarlet fever  S/P debridement    CrCl:40.29    IMPROVE VTE Risk Score: #5    [id number] Score: #6    IMPROVE Bleeding Risk Score    Falls Risk:   High (x  )  Mod (  )  Low (  )    4/21: Patient seen at bedside, discussed current INR from 1.49 to 2.72- brisk rise. Will hold coumadin tonight. Patient verbalizes understanding- explains that she doesn't feel well today- tired- and wants to work with PT. Needs OT for hand strength and dexterity.  4/22: Patient seen at bedside- resting comfortable- did work with PT yesterday. Coumadin held yesterday- INR 2.90 this am (rising) therefore will hold again tonight.  Stable, no changes in dispo.  4-23-18 Pt seen at bedside. discussed her anticoagulation with coumadin, questions answered.  Pt concerned about going to rehab.   4-24-18 pt seen at bedside helped oob to chair was sitting n edge of bed.  Dr Malhotra present.  Discussed her anticoagulation with coumadin and starting Lovenox until inr is 2 or greater.  Pt has no concerns about anticoagulation  She states she has concerns about going to rehab and she signed a appeal.  FAMILY HISTORY:  Family history of heart disease (Father, Mother, Sibling)    Denies any personal or familial history of clotting or bleeding disorders.    Allergies    No Known Allergies    Intolerances        REVIEW OF SYSTEMS    (  )Fever	     (  )Constipation	(  )SOB				(  )Headache	(  )Dysuria  (  )Chills	     (  )Melena	(  )Dyspnea present on exertion	                    (  )Dizziness                    (  )Polyuria  (  )Nausea	     (  )Hematochezia	(  )Cough			                    (  )Syncope   	(  )Hematuria  (  )Vomiting    (  )Chest Pain	(  )Wheezing			(  )Weakness  (  )Diarrhea     (  )Palpitations	(  )Anorexia			(  )Myalgia    All other review of systems negative: Yes          PHYSICAL EXAM:    Constitutional: Appears Well    Neurological: A& O x 3, STEELE  RLE <LLE    Skin: Warm    Respiratory and Thorax: normal effort; Breath sounds: normal; No rales/wheezing/rhonchi  	  Cardiovascular: S1, S2, irregular, NMBR	MP Aflutter    Gastrointestinal: BS + x 4Q, nontender	    Genitourinary:  Bladder nondistended, nontender    Musculoskeletal:   General Right:   no muscle/joint tenderness,   normal tone, no joint swelling,   ROM: limited	    General Left:   no muscle/joint tenderness,   normal tone, no joint swelling,   ROM: /full    Lower extrems:   Right: no calf tenderness              negative jojo's sign               + pedal pulses    Left:   no calf tenderness              negative jojo's sign               + pedal pulses    04-24    140  |  105  |  14  ----------------------------<  92  3.7   |  26  |  0.60    Ca    8.7      24 Apr 2018 05:25                           12.5   9.16  )-----------( 380      ( 22 Apr 2018 06:01 )             38.3       PT/INR - ( 23 Apr 2018 06:40 )   PT: 21.4 sec;   INR: 1.95 ratio                               12.5   9.16  )-----------( 380      ( 22 Apr 2018 06:01 )             38.3       04-21    142  |  111<H>  |  19  ----------------------------<  88  3.9   |  24  |  0.71    Ca    8.8      21 Apr 2018 05:51  Mg     2.2     04-21    PT/INR - ( 24 Apr 2018 05:25 )   PT: 17.8 sec;   INR: 1.63 ratio   PT/INR - ( 23 Apr 2018 06:40 )   PT: 21.4 sec;   INR: 1.95 ratio  PT/INR - ( 22 Apr 2018 06:01 )   PT: 32.0 sec;   INR: 2.90 ratio    PT/INR - ( 21 Apr 2018 05:51 )   PT: 30.0 sec;   INR: 2.72 ratio    PT/INR - ( 20 Apr 2018 04:06 )   PT: 16.2 sec;   INR: 1.49 ratio         MEDICATIONS  (STANDING):  aspirin enteric coated 81 milliGRAM(s) Oral daily  atorvastatin 80 milliGRAM(s) Oral at bedtime  levothyroxine 100 MICROGram(s) Oral daily  metoprolol tartrate 25 milliGRAM(s) Oral two times a day  warfarin 4 milliGRAM(s) Oral daily        DVT Prophylaxis:  LMWH                   (  )  Heparin SQ           (  )  Coumadin             ( x )  Xarelto                  (  )  Eliquis                   (  )  Venodynes           (x  )  Ambulation          (x  )  UFH                       (  )  Contraindicated  (  )    Vital Signs Last 24 Hrs  T(C): 36.6 (04-24-18 @ 11:39), Max: 37.4 (04-24-18 @ 04:40)  T(F): 97.9 (04-24-18 @ 11:39), Max: 99.4 (04-24-18 @ 04:40)  HR: 92 (04-24-18 @ 11:39) (92 - 94)  BP: 126/82 (04-24-18 @ 11:39) (124/89 - 126/82)  BP(mean): --  RR: 18 (04-24-18 @ 11:39) (18 - 19)  SpO2: 96% (04-24-18 @ 11:39) (96% - 98%)

## 2018-04-24 NOTE — PROGRESS NOTE ADULT - SUBJECTIVE AND OBJECTIVE BOX
cc: fall and weakness    HPI: 87 year old Woman with pmhx HTN, hypothyroidism, scarlet fever as child, h/o osteomyelitis in left elbow and right knee s/p multiple surgeries as child now presenting after having fall at home. Patient states at roughly 4:30 AM on day of admission as she was getting out of bed to go to bathroom, getting dizzy and falling to ground. She reports no head trauma during the event. She states she was on floor for 2 hours, and had difficulty getting up to her feet.   A phone was noted nearby and she called 911. She notes during her time on the floor having noted right arm weakness  and leg weakness that was intermittent but progressively worsening. She notes having falls in the past due to "2 bum knees" and having difficulty getting up due to her joint issues. In ED, it was noted that patient had atrial fibrillation rhythm and CTA head demonstrated occlusion with cut off of the mid left M1 segment with a 1 cm thrombus and high grade stenosis of left carotid artery. [facility] was called by ED attending for probable neurovascular intervention but was deemed to be not a candidate by Dr. Cook due to age, lesion location and NIHSS score. She is presently laying in bed, resting comfortably, concerned regarding her arm and leg weakness but otherwise reports no dizziness, headache, vision changes. (14 Apr 2018 14:51)    4/19: No complaints, feeling well. Awaiting therapeutic INR.   4/20: had LBM overnight x5 after taking miralax; had red stool d/t her eating beets for dinner; denies CP/palpitations/SOB; no abd pain/n/v/f/c; concern that she will have to get a new PCP (she's not clear as to why she thinks Paul does not desire to be her PCP)  4/21: States she doesn't feel good but admits it is very non-specific.    4/22: No new issues, status quo.  INR therapeutic. Awaiting discharge to rehab.  4/23: discharge to rehab; but refused  4/24: continues to refuse discharge; c/o frequent cough with sputum production denies fever, chills, has chest discomfort with frequent cough, otherwise no CP    Review of system- rest of review of systems are negative except as mentioned in HPI    Vital Signs Last 24 Hrs  T(C): 36.6 (24 Apr 2018 11:39), Max: 37.4 (24 Apr 2018 04:40)  T(F): 97.9 (24 Apr 2018 11:39), Max: 99.4 (24 Apr 2018 04:40)  HR: 92 (24 Apr 2018 11:39) (92 - 94)  BP: 126/82 (24 Apr 2018 11:39) (124/89 - 126/82)  BP(mean): --  RR: 18 (24 Apr 2018 11:39) (18 - 19)  SpO2: 96% (24 Apr 2018 11:39) (96% - 98%)    PHYSICAL EXAM:    GENERAL: frail, NAD   HEAD:  Atraumatic, Normocephalic  EYES: EOMI, PERRLA, conjunctiva and sclera clear  HEENT: Moist mucous membranes  NECK: Supple, No JVD  NERVOUS SYSTEM:  Alert & Oriented X3,  R side weakness   CHEST/LUNG: Clear to auscultation bilaterally; No rales, rhonchi, wheezing, or rubs, frequent cough during assessment with small amt of pale yellow thick sputum  HEART:S1S2 normal  ABDOMEN: Soft, Nontender, Nondistended; Bowel sounds present  GENITOURINARY- Voiding, no palpable bladder  EXTREMITIES:  2+ Peripheral Pulses, No clubbing, cyanosis, or edema  MUSCULOSKELETAL  ability to lift RLE improved; now with active ROM to RUE; chronic deformity to LUE  SKIN-no rash, no lesion  PSYCH- very talkative with prolonged conversation, mood stable  LYMPH Node- No palpable lymph node    LABS      04-24    140  |  105  |  14  ----------------------------<  92  3.7   |  26  |  0.60    Ca    8.7      24 Apr 2018 05:25    PT/INR - ( 24 Apr 2018 05:25 )   PT: 17.8 sec;   INR: 1.63 ratio

## 2018-04-24 NOTE — PROGRESS NOTE ADULT - ASSESSMENT
A/P:   86 yo female with new onset afib/flutter Vidal vasc #5,  embolic CVA    4/22: Due to multiple doses of 10mg coumadin several days back- INR is briskly rising- will hold coumadin again tonight as INR 2.9    Plan:  :: increase to one dose of 7.5mg today of coumadin and start on Lovenox 40mg sq daily. discussed plan with Dr Malhotra. He has no concerns.   ::Daily PT/INR/CBC/BMP  ::To go to rehab  ::[facility] b/l  will f/u

## 2018-04-24 NOTE — PROGRESS NOTE ADULT - ATTENDING COMMENTS
Patient seen and examined.   Complaining of cough.  Agree with NP Domingo assessment and plan.   Discussed with patient in length regarding management and d/c plan.   Check CXR

## 2018-04-25 LAB
HCT VFR BLD CALC: 38.2 % — SIGNIFICANT CHANGE UP (ref 34.5–45)
HGB BLD-MCNC: 12.9 G/DL — SIGNIFICANT CHANGE UP (ref 11.5–15.5)
INR BLD: 1.56 RATIO — HIGH (ref 0.88–1.16)
MCHC RBC-ENTMCNC: 30.1 PG — SIGNIFICANT CHANGE UP (ref 27–34)
MCHC RBC-ENTMCNC: 33.8 GM/DL — SIGNIFICANT CHANGE UP (ref 32–36)
MCV RBC AUTO: 89 FL — SIGNIFICANT CHANGE UP (ref 80–100)
NRBC # BLD: 0 /100 WBCS — SIGNIFICANT CHANGE UP (ref 0–0)
PLATELET # BLD AUTO: 308 K/UL — SIGNIFICANT CHANGE UP (ref 150–400)
PROTHROM AB SERPL-ACNC: 17 SEC — HIGH (ref 9.8–12.7)
RBC # BLD: 4.29 M/UL — SIGNIFICANT CHANGE UP (ref 3.8–5.2)
RBC # FLD: 13.5 % — SIGNIFICANT CHANGE UP (ref 10.3–14.5)
WBC # BLD: 11.53 K/UL — HIGH (ref 3.8–10.5)
WBC # FLD AUTO: 11.53 K/UL — HIGH (ref 3.8–10.5)

## 2018-04-25 PROCEDURE — 99233 SBSQ HOSP IP/OBS HIGH 50: CPT

## 2018-04-25 PROCEDURE — 71275 CT ANGIOGRAPHY CHEST: CPT | Mod: 26

## 2018-04-25 RX ORDER — SODIUM CHLORIDE 9 MG/ML
250 INJECTION INTRAMUSCULAR; INTRAVENOUS; SUBCUTANEOUS ONCE
Qty: 0 | Refills: 0 | Status: COMPLETED | OUTPATIENT
Start: 2018-04-25 | End: 2018-04-25

## 2018-04-25 RX ORDER — ACETAMINOPHEN 500 MG
650 TABLET ORAL EVERY 6 HOURS
Qty: 0 | Refills: 0 | Status: DISCONTINUED | OUTPATIENT
Start: 2018-04-25 | End: 2018-04-30

## 2018-04-25 RX ORDER — ONDANSETRON 8 MG/1
4 TABLET, FILM COATED ORAL ONCE
Qty: 0 | Refills: 0 | Status: COMPLETED | OUTPATIENT
Start: 2018-04-25 | End: 2018-04-25

## 2018-04-25 RX ORDER — PIPERACILLIN AND TAZOBACTAM 4; .5 G/20ML; G/20ML
4.5 INJECTION, POWDER, LYOPHILIZED, FOR SOLUTION INTRAVENOUS EVERY 8 HOURS
Qty: 0 | Refills: 0 | Status: DISCONTINUED | OUTPATIENT
Start: 2018-04-25 | End: 2018-04-26

## 2018-04-25 RX ORDER — IPRATROPIUM BROMIDE 0.2 MG/ML
500 SOLUTION, NON-ORAL INHALATION ONCE
Qty: 0 | Refills: 0 | Status: COMPLETED | OUTPATIENT
Start: 2018-04-25 | End: 2018-04-25

## 2018-04-25 RX ORDER — METOPROLOL TARTRATE 50 MG
5 TABLET ORAL ONCE
Qty: 0 | Refills: 0 | Status: COMPLETED | OUTPATIENT
Start: 2018-04-25 | End: 2018-04-25

## 2018-04-25 RX ORDER — WARFARIN SODIUM 2.5 MG/1
7.5 TABLET ORAL ONCE
Qty: 0 | Refills: 0 | Status: COMPLETED | OUTPATIENT
Start: 2018-04-25 | End: 2018-04-25

## 2018-04-25 RX ADMIN — ATORVASTATIN CALCIUM 80 MILLIGRAM(S): 80 TABLET, FILM COATED ORAL at 21:36

## 2018-04-25 RX ADMIN — Medication 650 MILLIGRAM(S): at 07:17

## 2018-04-25 RX ADMIN — ENOXAPARIN SODIUM 40 MILLIGRAM(S): 100 INJECTION SUBCUTANEOUS at 11:08

## 2018-04-25 RX ADMIN — Medication 650 MILLIGRAM(S): at 16:31

## 2018-04-25 RX ADMIN — SODIUM CHLORIDE 8.33 MILLILITER(S): 9 INJECTION INTRAMUSCULAR; INTRAVENOUS; SUBCUTANEOUS at 18:23

## 2018-04-25 RX ADMIN — PIPERACILLIN AND TAZOBACTAM 25.03 GRAM(S): 4; .5 INJECTION, POWDER, LYOPHILIZED, FOR SOLUTION INTRAVENOUS at 21:36

## 2018-04-25 RX ADMIN — Medication 200 MILLIGRAM(S): at 06:19

## 2018-04-25 RX ADMIN — WARFARIN SODIUM 7.5 MILLIGRAM(S): 2.5 TABLET ORAL at 21:37

## 2018-04-25 RX ADMIN — ONDANSETRON 4 MILLIGRAM(S): 8 TABLET, FILM COATED ORAL at 16:31

## 2018-04-25 RX ADMIN — Medication 600 MILLIGRAM(S): at 16:31

## 2018-04-25 RX ADMIN — Medication 100 MICROGRAM(S): at 06:19

## 2018-04-25 RX ADMIN — Medication 5 MILLIGRAM(S): at 16:27

## 2018-04-25 RX ADMIN — Medication 81 MILLIGRAM(S): at 11:08

## 2018-04-25 RX ADMIN — Medication 500 MICROGRAM(S): at 16:38

## 2018-04-25 RX ADMIN — PIPERACILLIN AND TAZOBACTAM 25 GRAM(S): 4; .5 INJECTION, POWDER, LYOPHILIZED, FOR SOLUTION INTRAVENOUS at 16:41

## 2018-04-25 NOTE — PROGRESS NOTE ADULT - SUBJECTIVE AND OBJECTIVE BOX
cc: fall and weakness    HPI: 87 year old Woman with pmhx HTN, hypothyroidism, scarlet fever as child, h/o osteomyelitis in left elbow and right knee s/p multiple surgeries as child now presenting after having fall at home. Patient states at roughly 4:30 AM on day of admission as she was getting out of bed to go to bathroom, getting dizzy and falling to ground. She reports no head trauma during the event. She states she was on floor for 2 hours, and had difficulty getting up to her feet.   A phone was noted nearby and she called 911. She notes during her time on the floor having noted right arm weakness  and leg weakness that was intermittent but progressively worsening. She notes having falls in the past due to "2 bum knees" and having difficulty getting up due to her joint issues. In ED, it was noted that patient had atrial fibrillation rhythm and CTA head demonstrated occlusion with cut off of the mid left M1 segment with a 1 cm thrombus and high grade stenosis of left carotid artery. Dr. Cook/Hugh Stroke physician was called by ED attending for probable neurovascular intervention but was deemed to be not a candidate by Dr. Cook due to age, lesion location and NIHSS score. She is presently laying in bed, resting comfortably, concerned regarding her arm and leg weakness but otherwise reports no dizziness, headache, vision changes. (14 Apr 2018 14:51)    4/19: No complaints, feeling well. Awaiting therapeutic INR.   4/20: had LBM overnight x5 after taking miralax; had red stool d/t her eating beets for dinner; denies CP/palpitations/SOB; no abd pain/n/v/f/c; concern that she will have to get a new PCP (she's not clear as to why she thinks Paul does not desire to be her PCP)  4/21: States she doesn't feel good but admits it is very non-specific.    4/22: No new issues, status quo.  INR therapeutic. Awaiting discharge to rehab.  4/23: discharge to rehab; but refused  4/24: continues to refuse discharge; c/o frequent cough with sputum production denies fever, chills, has chest discomfort with frequent cough, otherwise no CP  04/25/18: Patient seen and examined. Complaining of cough and sob. CT chest today-multi focal pneumonia. Discussed with patient in length regarding management and d/c plan. Called son and left message.     Review of system- rest of review of systems are negative except as mentioned in HPI    Vital Signs Last 24 Hrs  T(C): 36.4 (25 Apr 2018 11:18), Max: 38.1 (25 Apr 2018 06:06)  T(F): 97.5 (25 Apr 2018 11:18), Max: 100.6 (25 Apr 2018 06:06)  HR: 89 (25 Apr 2018 11:18) (81 - 100)  BP: 137/64 (25 Apr 2018 11:18) (110/41 - 146/90)  BP(mean): --  RR: 18 (25 Apr 2018 11:18) (18 - 18)  SpO2: 97% (25 Apr 2018 11:18) (92% - 98%)      PHYSICAL EXAM:    GENERAL: frail, NAD   HEAD:  Atraumatic, Normocephalic  EYES: EOMI, PERRLA, conjunctiva and sclera clear  HEENT: Moist mucous membranes  NECK: Supple, No JVD  NERVOUS SYSTEM:  Alert & Oriented X3,  R side weakness   CHEST/LUNG: Clear to auscultation bilaterally; No rales, rhonchi, wheezing, or rubs, frequent cough during assessment with small amt of pale yellow thick sputum  HEART:S1S2 normal  ABDOMEN: Soft, Nontender, Nondistended; Bowel sounds present  GENITOURINARY- Voiding, no palpable bladder  EXTREMITIES:  2+ Peripheral Pulses, No clubbing, cyanosis, or edema  MUSCULOSKELETAL  ability to lift RLE improved; now with active ROM to RUE; chronic deformity to LUE  SKIN-no rash, no lesion  PSYCH- very talkative with prolonged conversation, mood stable  LYMPH Node- No palpable lymph node    LABS                            12.9   11.53 )-----------( 308      ( 25 Apr 2018 12:38 )             38.2     24 Apr 2018 05:25    140    |  105    |  14     ----------------------------<  92     3.7     |  26     |  0.60     Ca    8.7        24 Apr 2018 05:25      MEDICATIONS:    aspirin enteric coated 81 milliGRAM(s) Oral daily  atorvastatin 80 milliGRAM(s) Oral at bedtime  enoxaparin Injectable 40 milliGRAM(s) SubCutaneous daily  guaiFENesin  milliGRAM(s) Oral every 12 hours  ipratropium  for Nebulization. 500 MICROGram(s) Nebulizer once  levothyroxine 100 MICROGram(s) Oral daily  metoprolol tartrate 25 milliGRAM(s) Oral two times a day  ondansetron Injectable 4 milliGRAM(s) IV Push once  piperacillin/tazobactam IVPB. 4.5 Gram(s) IV Intermittent every 8 hours  warfarin 7.5 milliGRAM(s) Oral once    MEDICATIONS  (PRN):  acetaminophen   Tablet 650 milliGRAM(s) Oral every 6 hours PRN For Temp greater than 38 C (100.4 F)  acetaminophen   Tablet. 650 milliGRAM(s) Oral every 6 hours PRN Mild Pain (1 - 3)    PT/INR - ( 25 Apr 2018 06:25 )   PT: 17.0 sec;   INR: 1.56 ratio

## 2018-04-25 NOTE — PROGRESS NOTE ADULT - ASSESSMENT
Assessment and Plan:  87 year old Woman with pmhx HTN, hypothyroidism, scarlet fever as child, h/o osteomyelitis in left elbow and right knee s/p multiple surgeries as child now presenting after having fall at home found to have acute CVA.    Acute CVA:   - con't asa/statin  - echo noted- severe AS and sever MS -out pt management   - off heparin  - coumadin subtherapeutic in the setting of being held 1 day d/t rapid INR increase  - f/b anticoag team: dose with lovenox x 1 today and coumadin 7. 5 mg  - off aspirin, stop indefinitely while on coumadin.  - monitor coags closely   - repeat CTH shows subacute CVA.  No definitive bleeding.      Afib- rate controlled  INR subtherapeutic  -con't  metoprolol    -monitor daily INR     Multi focal pneumonia  Check blood cultures.  Start IV zosyn  ID consult    ICA stensosis:   Dr. Alexander follow up appreciated, out pt management at [facility]     Dispo  Cancel discharge.   To rehab once medically stable

## 2018-04-25 NOTE — PROGRESS NOTE ADULT - SUBJECTIVE AND OBJECTIVE BOX
HPI: This is a 87 year old female with pmhx HTN, hypothyroidism, scarlet fever as child, h/o osteomyelitis in left elbow and right knee s/p multiple surgeries as child now presenting after having fall at home. Patient states at roughly 4:30 AM this morning as she was getting out of bed to go to bathroom, getting dizzy and falling to ground. She reports no head trauma during the event. She states she was on floor for 2 hours, and had difficulty getting up to her feet. A phone was noted nearby and she called 911. She notes during her time on the floor having noted right arm weakness  and leg weakness that was intermittent but progressively worsening. She notes having falls in the past due to "2 bum knees" and having difficulty getting up due to her joint issues. She states yesterday feeling well and reports no weakness, dizziness, palpitations, chest pain or shortness of breath. In ED, it was noted that patient had atrial fibrillation rhythm and CTA head demonstrated occlusion with cut off of the mid left M1 segment with a 1 cm thrombus and high grade stenosis of left carotid artery. Dr. Cook/Hugh Stroke physician was called by ED attending for probable neurovascular intervention but was deemed to be not a candidate by Dr. Cook due to age, lesion location and NIHSS score. She is presently laying in bed, resting comfortably, concerned regarding her arm and leg weakness but otherwise reports no dizziness, headache, vision changes. (14 Apr 2018 14:51)    Patient is a 87y old  Female who presents with a chief complaint of fall and weakness (14 Apr 2018 14:51)    Consulted by Dr. Chambers  for VTE prophylaxis, risk stratification, and anticoagulation management.    PAST MEDICAL & SURGICAL HISTORY:  Essential hypertension  Chronic multifocal osteomyelitis, other site  Scarlet fever  S/P debridement    CrCl:40.29    IMPROVE VTE Risk Score: #5    [id number] Score: #6    IMPROVE Bleeding Risk Score    Falls Risk:   High (x  )  Mod (  )  Low (  )    4/21: Patient seen at bedside, discussed current INR from 1.49 to 2.72- brisk rise. Will hold coumadin tonight. Patient verbalizes understanding- explains that she doesn't feel well today- tired- and wants to work with PT. Needs OT for hand strength and dexterity.  4/22: Patient seen at bedside- resting comfortable- did work with PT yesterday. Coumadin held yesterday- INR 2.90 this am (rising) therefore will hold again tonight.  Stable, no changes in dispo.  4-23-18 Pt seen at bedside. discussed her anticoagulation with coumadin, questions answered.  Pt concerned about going to rehab.   4-24-18 pt seen at bedside helped oob to chair was sitting n edge of bed.  Dr Malhotra present.  Discussed her anticoagulation with coumadin and starting Lovenox until inr is 2 or greater.  Pt has no concerns about anticoagulation  She states she has concerns about going to rehab and she signed a appeal.  4/25/18: Pt seen at bedside, talking on phone. Informed pt of her INR result, brisk drop and will continue with same dose.     FAMILY HISTORY:  Family history of heart disease (Father, Mother, Sibling)    Denies any personal or familial history of clotting or bleeding disorders.    Allergies    No Known Allergies    Intolerances    REVIEW OF SYSTEMS    (  )Fever	     (  )Constipation	(  )SOB				(  )Headache	(  )Dysuria  (  )Chills	     (  )Melena	(  )Dyspnea present on exertion	                    (  )Dizziness                    (  )Polyuria  (  )Nausea	     (  )Hematochezia	(  )Cough			                    (  )Syncope   	(  )Hematuria  (  )Vomiting    (  )Chest Pain	(  )Wheezing			(  )Weakness  (  )Diarrhea     (  )Palpitations	(  )Anorexia			(  )Myalgia    All other review of systems negative: Yes    PHYSICAL EXAM:    Constitutional: Appears Well    Neurological: A& O x 3, STEELE  RLE <LLE    Skin: Warm    Respiratory and Thorax: normal effort; Breath sounds: normal; No rales/wheezing/rhonchi  	  Cardiovascular: S1, S2, irregular, NMBR	MP Aflutter    Gastrointestinal: BS + x 4Q, nontender	    Genitourinary:  Bladder nondistended, nontender    Musculoskeletal:   General Right:   no muscle/joint tenderness,   normal tone, no joint swelling,   ROM: limited	    General Left:   no muscle/joint tenderness,   normal tone, no joint swelling,   ROM: full    Lower extrems:   Right: no calf tenderness              negative jojo's sign               + pedal pulses    Left:   no calf tenderness              negative jojo's sign               + pedal pulses        04-24    140  |  105  |  14  ----------------------------<  92  3.7   |  26  |  0.60    Ca    8.7      24 Apr 2018 05:25    PT/INR - ( 25 Apr 2018 06:25 )   PT: 17.0 sec;   INR: 1.56 ratio    PT/INR - ( 24 Apr 2018 05:25 )   PT: 17.8 sec;   INR: 1.63 ratio   PT/INR - ( 23 Apr 2018 06:40 )   PT: 21.4 sec;   INR: 1.95 ratio  PT/INR - ( 22 Apr 2018 06:01 )   PT: 32.0 sec;   INR: 2.90 ratio    PT/INR - ( 21 Apr 2018 05:51 )   PT: 30.0 sec;   INR: 2.72 ratio    PT/INR - ( 20 Apr 2018 04:06 )   PT: 16.2 sec;   INR: 1.49 ratio      MEDICATIONS  (STANDING):  aspirin enteric coated 81 milliGRAM(s) Oral daily  atorvastatin 80 milliGRAM(s) Oral at bedtime  enoxaparin Injectable 40 milliGRAM(s) SubCutaneous daily  levothyroxine 100 MICROGram(s) Oral daily  metoprolol tartrate 25 milliGRAM(s) Oral two times a day  warfarin 7.5 milliGRAM(s) Oral once    Vital Signs Last 24 Hrs  T(C): 38.1 (04-25-18 @ 06:06), Max: 38.1 (04-25-18 @ 06:06)  T(F): 100.6 (04-25-18 @ 06:06), Max: 100.6 (04-25-18 @ 06:06)  HR: 81 (04-25-18 @ 06:06) (81 - 100)  BP: 110/41 (04-25-18 @ 06:06) (110/41 - 146/90)  BP(mean): --  RR: 18 (04-25-18 @ 06:06) (18 - 18)  SpO2: 98% (04-25-18 @ 06:06) (92% - 98%)    IMAGING:  EXAM:  CT BRAIN:  04/20/2018    IMPRESSION: mild periventricular white matter ischemia. Small subacute infarction in the LEFT basal ganglia is noted with suggestion of faint internal hyperdensity which may reflect a punctate focus of hemorrhage.       DVT Prophylaxis:  LMWH                   ( X )  Heparin SQ           (  )  Coumadin             ( x )  Xarelto                  (  )  Eliquis                   (  )  Venodynes           (x  )  Ambulation          (x  )  UFH                       (  )  Contraindicated  (  )

## 2018-04-25 NOTE — PROGRESS NOTE ADULT - ASSESSMENT
A 86 yo female with new onset afib/flutter Vidal vasc #5, embolic CVA    Plan:  :: c/w warfarin PO 7.5mg for brisk drop in INR of 1.63-> 1.56.  :: will need to c/w VTE ppx with lovenox 40 mg SQ daily d/t subtherapeutic INR  ::Daily PT/INR/CBC/BMP  :: Possible d/c to rehab today    Will continue to follow.

## 2018-04-26 LAB
ANION GAP SERPL CALC-SCNC: 9 MMOL/L — SIGNIFICANT CHANGE UP (ref 5–17)
BUN SERPL-MCNC: 19 MG/DL — SIGNIFICANT CHANGE UP (ref 7–23)
CALCIUM SERPL-MCNC: 8 MG/DL — LOW (ref 8.5–10.1)
CHLORIDE SERPL-SCNC: 107 MMOL/L — SIGNIFICANT CHANGE UP (ref 96–108)
CO2 SERPL-SCNC: 26 MMOL/L — SIGNIFICANT CHANGE UP (ref 22–31)
CREAT SERPL-MCNC: 0.74 MG/DL — SIGNIFICANT CHANGE UP (ref 0.5–1.3)
GLUCOSE SERPL-MCNC: 86 MG/DL — SIGNIFICANT CHANGE UP (ref 70–99)
HCT VFR BLD CALC: 34.9 % — SIGNIFICANT CHANGE UP (ref 34.5–45)
HGB BLD-MCNC: 11.5 G/DL — SIGNIFICANT CHANGE UP (ref 11.5–15.5)
INR BLD: 2.29 RATIO — HIGH (ref 0.88–1.16)
MCHC RBC-ENTMCNC: 29.8 PG — SIGNIFICANT CHANGE UP (ref 27–34)
MCHC RBC-ENTMCNC: 33 GM/DL — SIGNIFICANT CHANGE UP (ref 32–36)
MCV RBC AUTO: 90.4 FL — SIGNIFICANT CHANGE UP (ref 80–100)
NRBC # BLD: 0 /100 WBCS — SIGNIFICANT CHANGE UP (ref 0–0)
PLATELET # BLD AUTO: 220 K/UL — SIGNIFICANT CHANGE UP (ref 150–400)
POTASSIUM SERPL-MCNC: 3.8 MMOL/L — SIGNIFICANT CHANGE UP (ref 3.5–5.3)
POTASSIUM SERPL-SCNC: 3.8 MMOL/L — SIGNIFICANT CHANGE UP (ref 3.5–5.3)
PROTHROM AB SERPL-ACNC: 25.2 SEC — HIGH (ref 9.8–12.7)
RBC # BLD: 3.86 M/UL — SIGNIFICANT CHANGE UP (ref 3.8–5.2)
RBC # FLD: 13.7 % — SIGNIFICANT CHANGE UP (ref 10.3–14.5)
SODIUM SERPL-SCNC: 142 MMOL/L — SIGNIFICANT CHANGE UP (ref 135–145)
WBC # BLD: 9.78 K/UL — SIGNIFICANT CHANGE UP (ref 3.8–10.5)
WBC # FLD AUTO: 9.78 K/UL — SIGNIFICANT CHANGE UP (ref 3.8–10.5)

## 2018-04-26 PROCEDURE — 99233 SBSQ HOSP IP/OBS HIGH 50: CPT

## 2018-04-26 RX ORDER — SODIUM CHLORIDE 9 MG/ML
500 INJECTION INTRAMUSCULAR; INTRAVENOUS; SUBCUTANEOUS ONCE
Qty: 0 | Refills: 0 | Status: COMPLETED | OUTPATIENT
Start: 2018-04-26 | End: 2018-04-26

## 2018-04-26 RX ORDER — PIPERACILLIN AND TAZOBACTAM 4; .5 G/20ML; G/20ML
3.38 INJECTION, POWDER, LYOPHILIZED, FOR SOLUTION INTRAVENOUS EVERY 8 HOURS
Qty: 0 | Refills: 0 | Status: DISCONTINUED | OUTPATIENT
Start: 2018-04-26 | End: 2018-04-30

## 2018-04-26 RX ORDER — IPRATROPIUM/ALBUTEROL SULFATE 18-103MCG
3 AEROSOL WITH ADAPTER (GRAM) INHALATION ONCE
Qty: 0 | Refills: 0 | Status: COMPLETED | OUTPATIENT
Start: 2018-04-26 | End: 2018-04-26

## 2018-04-26 RX ORDER — WARFARIN SODIUM 2.5 MG/1
5 TABLET ORAL DAILY
Qty: 0 | Refills: 0 | Status: DISCONTINUED | OUTPATIENT
Start: 2018-04-26 | End: 2018-04-27

## 2018-04-26 RX ADMIN — Medication 600 MILLIGRAM(S): at 18:29

## 2018-04-26 RX ADMIN — PIPERACILLIN AND TAZOBACTAM 25.03 GRAM(S): 4; .5 INJECTION, POWDER, LYOPHILIZED, FOR SOLUTION INTRAVENOUS at 18:28

## 2018-04-26 RX ADMIN — Medication 81 MILLIGRAM(S): at 12:56

## 2018-04-26 RX ADMIN — Medication 3 MILLILITER(S): at 20:34

## 2018-04-26 RX ADMIN — PIPERACILLIN AND TAZOBACTAM 25.03 GRAM(S): 4; .5 INJECTION, POWDER, LYOPHILIZED, FOR SOLUTION INTRAVENOUS at 05:32

## 2018-04-26 RX ADMIN — Medication 100 MICROGRAM(S): at 05:35

## 2018-04-26 RX ADMIN — Medication 25 MILLIGRAM(S): at 18:29

## 2018-04-26 RX ADMIN — ENOXAPARIN SODIUM 40 MILLIGRAM(S): 100 INJECTION SUBCUTANEOUS at 12:56

## 2018-04-26 RX ADMIN — ATORVASTATIN CALCIUM 80 MILLIGRAM(S): 80 TABLET, FILM COATED ORAL at 22:17

## 2018-04-26 RX ADMIN — WARFARIN SODIUM 5 MILLIGRAM(S): 2.5 TABLET ORAL at 22:15

## 2018-04-26 RX ADMIN — SODIUM CHLORIDE 500 MILLILITER(S): 9 INJECTION INTRAMUSCULAR; INTRAVENOUS; SUBCUTANEOUS at 05:32

## 2018-04-26 RX ADMIN — PIPERACILLIN AND TAZOBACTAM 25 GRAM(S): 4; .5 INJECTION, POWDER, LYOPHILIZED, FOR SOLUTION INTRAVENOUS at 22:17

## 2018-04-26 RX ADMIN — Medication 600 MILLIGRAM(S): at 05:36

## 2018-04-26 NOTE — PROGRESS NOTE ADULT - ASSESSMENT
Assessment and Plan:  87 year old Woman with pmhx HTN, hypothyroidism, scarlet fever as child, h/o osteomyelitis in left elbow and right knee s/p multiple surgeries as child now presenting after having fall at home found to have acute CVA.    Acute CVA:   - con't statin  - echo noted- severe AS and sever MS -out pt management   - off heparin  - off aspirin,  while on coumadin.  - repeat CTH shows subacute CVA.  No definitive bleeding.      Afib- rate controlled  INR therapeutic today  -con't  metoprolol    -monitor daily INR     Multi focal pneumonia  Follow blood cultures.  Started IV zosyn  ID consult appreciated    ICA stensosis:   Dr. Alexander follow up appreciated, out pt management at [facility]     Dispo    To rehab once medically stable

## 2018-04-26 NOTE — PROGRESS NOTE ADULT - ASSESSMENT
A 88 yo female with new onset afib/flutter Vidal vasc #5, embolic CVA    Plan:  ::  warfarin PO 5mg po tonight  INR 1.56-----> 2.29  :: will need to c/w VTE ppx with lovenox 40 mg SQ daily d/t subtherapeutic INR x 1 more day  ::Daily PT/INR/CBC/BMP  :: Possible d/c to rehab today    Will continue to follow.

## 2018-04-26 NOTE — PROGRESS NOTE ADULT - SUBJECTIVE AND OBJECTIVE BOX
cc: fall and weakness    HPI: 87 year old Woman with pmhx HTN, hypothyroidism, scarlet fever as child, h/o osteomyelitis in left elbow and right knee s/p multiple surgeries as child now presenting after having fall at home. Patient states at roughly 4:30 AM on day of admission as she was getting out of bed to go to bathroom, getting dizzy and falling to ground. She reports no head trauma during the event. She states she was on floor for 2 hours, and had difficulty getting up to her feet.   A phone was noted nearby and she called 911. She notes during her time on the floor having noted right arm weakness  and leg weakness that was intermittent but progressively worsening. She notes having falls in the past due to "2 bum knees" and having difficulty getting up due to her joint issues. In ED, it was noted that patient had atrial fibrillation rhythm and CTA head demonstrated occlusion with cut off of the mid left M1 segment with a 1 cm thrombus and high grade stenosis of left carotid artery. Dr. Cook/Hugh Stroke physician was called by ED attending for probable neurovascular intervention but was deemed to be not a candidate by Dr. Cook due to age, lesion location and NIHSS score. She is presently laying in bed, resting comfortably, concerned regarding her arm and leg weakness but otherwise reports no dizziness, headache, vision changes. (14 Apr 2018 14:51)    4/19: No complaints, feeling well. Awaiting therapeutic INR.   4/20: had LBM overnight x5 after taking miralax; had red stool d/t her eating beets for dinner; denies CP/palpitations/SOB; no abd pain/n/v/f/c; concern that she will have to get a new PCP (she's not clear as to why she thinks Paul does not desire to be her PCP)  4/21: States she doesn't feel good but admits it is very non-specific.    4/22: No new issues, status quo.  INR therapeutic. Awaiting discharge to rehab.  4/23: discharge to rehab; but refused  4/24: continues to refuse discharge; c/o frequent cough with sputum production denies fever, chills, has chest discomfort with frequent cough, otherwise no CP  04/25/18: Patient seen and examined. Complaining of cough and sob. CT chest today-multi focal pneumonia. Discussed with patient in length regarding management and d/c plan. Called son and left message.   04/26/18: Patient seen and examined. SOB better today, still with cough. Discussed with patient in length regarding management and d/c plan.     Review of system- rest of review of systems are negative except as mentioned in HPI    Vital Signs Last 24 Hrs  T(C): 36.5 (26 Apr 2018 10:45), Max: 37.6 (25 Apr 2018 16:30)  T(F): 97.7 (26 Apr 2018 10:45), Max: 99.6 (25 Apr 2018 16:30)  HR: 88 (26 Apr 2018 10:45) (79 - 141)  BP: 117/64 (26 Apr 2018 10:45) (75/51 - 117/64)  BP(mean): --  RR: 18 (26 Apr 2018 10:45) (18 - 24)  SpO2: 94% (26 Apr 2018 10:45) (94% - 100%)      PHYSICAL EXAM:    GENERAL: frail, NAD   HEAD:  Atraumatic, Normocephalic  EYES: EOMI, PERRLA, conjunctiva and sclera clear  HEENT: Moist mucous membranes  NECK: Supple, No JVD  NERVOUS SYSTEM:  Alert & Oriented X3,  R side weakness   CHEST/LUNG: Clear to auscultation bilaterally; No rales, rhonchi, wheezing, or rubs, frequent cough during assessment with small amt of pale yellow thick sputum  HEART:S1S2 normal  ABDOMEN: Soft, Nontender, Nondistended; Bowel sounds present  GENITOURINARY- Voiding, no palpable bladder  EXTREMITIES:  2+ Peripheral Pulses, No clubbing, cyanosis, or edema  MUSCULOSKELETAL  ability to lift RLE improved; now with active ROM to RUE; chronic deformity to LUE  SKIN-no rash, no lesion  PSYCH- very talkative with prolonged conversation, mood stable  LYMPH Node- No palpable lymph node          LABS                              11.5   9.78  )-----------( 220      ( 26 Apr 2018 05:59 )             34.9     26 Apr 2018 05:59    142    |  107    |  19     ----------------------------<  86     3.8     |  26     |  0.74     Ca    8.0        26 Apr 2018 05:59        PT/INR - ( 26 Apr 2018 09:03 )   PT: 25.2 sec;   INR: 2.29 ratio                                   MEDICATIONS:         MEDICATIONS  (STANDING):  aspirin enteric coated 81 milliGRAM(s) Oral daily  atorvastatin 80 milliGRAM(s) Oral at bedtime  enoxaparin Injectable 40 milliGRAM(s) SubCutaneous daily  guaiFENesin  milliGRAM(s) Oral every 12 hours  ipratropium  for Nebulization. 500 MICROGram(s) Nebulizer once  levothyroxine 100 MICROGram(s) Oral daily  metoprolol tartrate 25 milliGRAM(s) Oral two times a day  piperacillin/tazobactam IVPB. 3.375 Gram(s) IV Intermittent every 8 hours    MEDICATIONS  (PRN):  acetaminophen   Tablet 650 milliGRAM(s) Oral every 6 hours PRN For Temp greater than 38 C (100.4 F)  acetaminophen   Tablet. 650 milliGRAM(s) Oral every 6 hours PRN Mild Pain (1 - 3)

## 2018-04-26 NOTE — CONSULT NOTE ADULT - SUBJECTIVE AND OBJECTIVE BOX
Patient is a 87y old  Female who presents with a chief complaint of fall and weakness (23 Apr 2018 16:01)    HPI:  87 year old female with pmhx HTN, hypothyroidism, scarlet fever as child, h/o osteomyelitis in left elbow and right knee s/p multiple surgeries as child now presenting after having fall at home on 4/14. Patient states at roughly 4:30 AM that morning as she was getting out of bed to go to bathroom, getting dizzy and falling to ground. She reports no head trauma during the event. She states she was on floor for 2 hours, and had difficulty getting up to her feet. She notes during her time on the floor having noted right arm weakness  and leg weakness that was intermittent but progressively worsening. She notes having falls in the past due to "2 bum knees" and having difficulty getting up due to her joint issues. In ED, it was noted that patient had atrial fibrillation rhythm and CTA head demonstrated occlusion with cut off of the mid left M1 segment with a 1 cm thrombus and high grade stenosis of left carotid artery c/w acute CVA, over hospital course pt improved, plan for discharge to rehab but in past 48 hours, developed worsening cough/sob/malaise, CTA chest done 4/25 showing multifocal pna/hcap, she was given IV zosyn.       PMH: as above  PSH: as above  Meds: per reconciliation sheet, noted below  MEDICATIONS  (STANDING):  aspirin enteric coated 81 milliGRAM(s) Oral daily  atorvastatin 80 milliGRAM(s) Oral at bedtime  enoxaparin Injectable 40 milliGRAM(s) SubCutaneous daily  guaiFENesin  milliGRAM(s) Oral every 12 hours  ipratropium  for Nebulization. 500 MICROGram(s) Nebulizer once  levothyroxine 100 MICROGram(s) Oral daily  metoprolol tartrate 25 milliGRAM(s) Oral two times a day  piperacillin/tazobactam IVPB. 4.5 Gram(s) IV Intermittent every 8 hours    MEDICATIONS  (PRN):  acetaminophen   Tablet 650 milliGRAM(s) Oral every 6 hours PRN For Temp greater than 38 C (100.4 F)  acetaminophen   Tablet. 650 milliGRAM(s) Oral every 6 hours PRN Mild Pain (1 - 3)    Allergies    No Known Allergies    Intolerances      Social: no smoking, no alcohol, no illegal drugs; no recent travel, no exposure to TB  FAMILY HISTORY:  Family history of heart disease (Father, Mother, Sibling)     no history of premature cardiovascular disease in first degree relatives    ROS: no HA, no dizziness, no sore throat, no blurry vision, no CP, no palpitations,  no abdominal pain, no diarrhea, no N/V, no dysuria, no leg pain, no claudication, no rash, no joint aches, no rectal pain or bleeding, no night sweats  All other systems reviewed and are negative    Vital Signs Last 24 Hrs  T(C): 36.5 (26 Apr 2018 10:45), Max: 37.6 (25 Apr 2018 16:30)  T(F): 97.7 (26 Apr 2018 10:45), Max: 99.6 (25 Apr 2018 16:30)  HR: 88 (26 Apr 2018 10:45) (79 - 141)  BP: 117/64 (26 Apr 2018 10:45) (75/51 - 117/64)  BP(mean): --  RR: 18 (26 Apr 2018 10:45) (18 - 24)  SpO2: 94% (26 Apr 2018 10:45) (94% - 100%)  Daily     Daily     PE:    Constitutional: frail looking  HEENT: NC/AT, EOMI, PERRLA, conjunctivae clear; ears and nose atraumatic; pharynx benign  Neck: supple; thyroid not palpable  Back: no tenderness  Respiratory: decreased breath sounds, rhonchi  Cardiovascular: S1S2 regular, no murmurs  Abdomen: soft, not tender, not distended, positive BS; liver and spleen WNL  Genitourinary: no suprapubic tenderness  Lymphatic: no LN palpable  Musculoskeletal: no muscle tenderness, no joint swelling or tenderness  Extremities: no pedal edema  Neurological/ Psychiatric: AxOx3, Judgement and insight normal;  moving all extremities  Skin: bruising along extremities, no palpable lesions    Labs: all available labs reviewed                        11.5   9.78  )-----------( 220      ( 26 Apr 2018 05:59 )             34.9     04-26    142  |  107  |  19  ----------------------------<  86  3.8   |  26  |  0.74    Ca    8.0<L>      26 Apr 2018 05:59         Radiology: all available radiological tests reviewed  < from: CT Angio Chest PE Protocol w/ IV Cont (04.25.18 @ 14:31) >    EXAM:  CT ANGIO CHEST PE PROTOCOL IC                            PROCEDURE DATE:  04/25/2018          INTERPRETATION:  CT ANGIO CHEST PE PROTOCOL IC    HISTORY:  Shortness of breath, cough, atrial fibrillation    TECHNIQUE: Contrast enhanced CT pulmonary angiogram was performed.   Multiplanar CT and HRCT images were reviewed. Maximum intensity   projection (MIP) images are reconstructed as per CT angiography protocol.   Images were acquired during the administration of 90 cc Omnipaque 350 IV   contrast. This study was performed using automatic exposure control   (radiation dose reduction software) to obtain a diagnostic image quality   scan with patient dose as low as reasonably achievable.    COMPARISON: Chest x-ray one day prior    PULMONARY ARTERIES: No pulmonary embolism to the segmental branches.   Limited visualization of subsegmental branches secondary to respiratory   motion. Enlargement of the main pulmonary artery up to 3.9 cm.    LUNGS, AIRWAYS: The central airways are patent.There is patchy   consolidation in both lower lobes and left upper lobe.    PLEURA: Trace right pleural effusion.    HEART AND VESSELS: Mild cardiomegaly. No pericardial effusion. Coronary   and aortic valve calcification. Severe calcification of themitral   annulus. Aortic atherosclerosis without aneurysm.    MEDIASTINUM, DONALDO, AXILLAE: No adenopathy.    UPPER ABDOMEN: Limited visualization is unremarkable.    BONES AND CHEST WALL: No acute bony abnormality.    IMPRESSION:     No pulmonary embolism to the segmental branches. Limited visualization of   subsegmental branches secondary to respiratory motion.     Enlargement of the main pulmonary artery up to 3.9 cm. Correlate for   pulmonary arterial hypertension.    Small patchy consolidationin both lower lobes and left upper lobe,   likely infection.        Advanced directives addressed: full resuscitation

## 2018-04-26 NOTE — CONSULT NOTE ADULT - ASSESSMENT
87 year old female with pmhx HTN, hypothyroidism, scarlet fever as child, h/o osteomyelitis in left elbow and right knee s/p multiple surgeries as child now presenting after having fall at home on 4/14 found to have acute CVA, hospital course c/b worsening cough/sob/malaise, CTA chest done 4/25 showing multifocal pna/hcap, she was given IV zosyn.     1. multifocal pna/HCAP/acute CVA  - agree with zosyn for resistant gnr/anaroebic/strep/mssa coverage #2  - check sputum cx  - monitor temps  -tolerating abx well so far; no side effects noted  -reason for abx use and side effects reviewed with patient  - supportive care  - f/u cbc    2. other issues - CVA, afib - care per medicine

## 2018-04-26 NOTE — CONSULT NOTE ADULT - CONSULT REQUESTED BY NAME
JUDITH HOSPITALIST  Progress Note     Bina Cordero Patient Status:  Inpatient    1950 MRN [id number]   [facility] 8NE-A Attending Yamila Henriquez MD   Hosp Day # 4 PCP Shannan Chaney MD     Chief Complaint:  SOB     S: Patient   c/o Roddy Koo heart failure- felt to be more likely sarcoid flair than HF   EF preserved   1. Lasix 20 mg po BID    2. Cardiology following   3. Wt down 2 lbs   2. Atrial fibrillation on Eliquis,    1. Metoprolol, cozaar   2. Eliquis  3. Echo preserved LVEF   4.  Sabina Gallardo

## 2018-04-26 NOTE — PROGRESS NOTE ADULT - SUBJECTIVE AND OBJECTIVE BOX
HPI: This is a 87 year old female with pmhx HTN, hypothyroidism, scarlet fever as child, h/o osteomyelitis in left elbow and right knee s/p multiple surgeries as child now presenting after having fall at home. Patient states at roughly 4:30 AM this morning as she was getting out of bed to go to bathroom, getting dizzy and falling to ground. She reports no head trauma during the event. She states she was on floor for 2 hours, and had difficulty getting up to her feet. A phone was noted nearby and she called 911. She notes during her time on the floor having noted right arm weakness  and leg weakness that was intermittent but progressively worsening. She notes having falls in the past due to "2 bum knees" and having difficulty getting up due to her joint issues. She states yesterday feeling well and reports no weakness, dizziness, palpitations, chest pain or shortness of breath. In ED, it was noted that patient had atrial fibrillation rhythm and CTA head demonstrated occlusion with cut off of the mid left M1 segment with a 1 cm thrombus and high grade stenosis of left carotid artery. Dr. Cook/Hugh Stroke physician was called by ED attending for probable neurovascular intervention but was deemed to be not a candidate by Dr. Cook due to age, lesion location and NIHSS score. She is presently laying in bed, resting comfortably, concerned regarding her arm and leg weakness but otherwise reports no dizziness, headache, vision changes. (14 Apr 2018 14:51)    Patient is a 87y old  Female who presents with a chief complaint of fall and weakness (14 Apr 2018 14:51)    Consulted by Dr. Chambers  for VTE prophylaxis, risk stratification, and anticoagulation management.    PAST MEDICAL & SURGICAL HISTORY:  Essential hypertension  Chronic multifocal osteomyelitis, other site  Scarlet fever  S/P debridement    CrCl:40.29    IMPROVE VTE Risk Score: #5    [id number] Score: #6    IMPROVE Bleeding Risk Score    Falls Risk:   High (x  )  Mod (  )  Low (  )    4/21: Patient seen at bedside, discussed current INR from 1.49 to 2.72- brisk rise. Will hold coumadin tonight. Patient verbalizes understanding- explains that she doesn't feel well today- tired- and wants to work with PT. Needs OT for hand strength and dexterity.  4/22: Patient seen at bedside- resting comfortable- did work with PT yesterday. Coumadin held yesterday- INR 2.90 this am (rising) therefore will hold again tonight.  Stable, no changes in dispo.  4-23-18 Pt seen at bedside. discussed her anticoagulation with coumadin, questions answered.  Pt concerned about going to rehab.   4-24-18 pt seen at bedside helped oob to chair was sitting n edge of bed.  Dr Malhotra present.  Discussed her anticoagulation with coumadin and starting Lovenox until inr is 2 or greater.  Pt has no concerns about anticoagulation  She states she has concerns about going to rehab and she signed a appeal.  4/25/18: Pt seen at bedside, talking on phone. Informed pt of her INR result, brisk drop and will continue with same dose.   4/26: seen at bedside, states " my numbers are up but I feel lousy"    FAMILY HISTORY:  Family history of heart disease (Father, Mother, Sibling)    Denies any personal or familial history of clotting or bleeding disorders.    Allergies    No Known Allergies    Intolerances    REVIEW OF SYSTEMS    (  )Fever	     (  )Constipation	(  )SOB				(  )Headache	(  )Dysuria  (  )Chills	     (  )Melena	(  )Dyspnea present on exertion	                    (  )Dizziness                    (  )Polyuria  (  )Nausea	     (  )Hematochezia	(  )Cough			                    (  )Syncope   	(  )Hematuria  (  )Vomiting    (  )Chest Pain	(  )Wheezing			(  )Weakness  (  )Diarrhea     (  )Palpitations	(  )Anorexia			(  )Myalgia    All other review of systems negative: Yes    PHYSICAL EXAM:    Constitutional: Appears Well    Neurological: A& O x 3, STEELE  RLE <LLE    Skin: Warm    Respiratory and Thorax: normal effort; Breath sounds: normal; No rales/wheezing/rhonchi  	  Cardiovascular: S1, S2, irregular, NMBR	MP Aflutter    Gastrointestinal: BS + x 4Q, nontender	    Genitourinary:  Bladder nondistended, nontender    Musculoskeletal:   General Right:   no muscle/joint tenderness,   normal tone, no joint swelling,   ROM: limited	    General Left:   no muscle/joint tenderness,   normal tone, no joint swelling,   ROM: full    Lower extrems:   Right: no calf tenderness              negative jojo's sign               + pedal pulses    Left:   no calf tenderness              negative jojo's sign               + pedal pulses                          11.5   9.78  )-----------( 220      ( 26 Apr 2018 05:59 )             34.9       04-26    142  |  107  |  19  ----------------------------<  86  3.8   |  26  |  0.74    Ca    8.0<L>      26 Apr 2018 05:59           04-24    140  |  105  |  14  ----------------------------<  92  3.7   |  26  |  0.60    Ca    8.7      24 Apr 2018 05:25      PT/INR - ( 26 Apr 2018 09:03 )   PT: 25.2 sec;   INR: 2.29 ratio        PT/INR - ( 25 Apr 2018 06:25 )   PT: 17.0 sec;   INR: 1.56 ratio    PT/INR - ( 24 Apr 2018 05:25 )   PT: 17.8 sec;   INR: 1.63 ratio   PT/INR - ( 23 Apr 2018 06:40 )   PT: 21.4 sec;   INR: 1.95 ratio  PT/INR - ( 22 Apr 2018 06:01 )   PT: 32.0 sec;   INR: 2.90 ratio    PT/INR - ( 21 Apr 2018 05:51 )   PT: 30.0 sec;   INR: 2.72 ratio    PT/INR - ( 20 Apr 2018 04:06 )   PT: 16.2 sec;   INR: 1.49 ratio      MEDICATIONS  (STANDING):  aspirin enteric coated 81 milliGRAM(s) Oral daily  atorvastatin 80 milliGRAM(s) Oral at bedtime  enoxaparin Injectable 40 milliGRAM(s) SubCutaneous daily  levothyroxine 100 MICROGram(s) Oral daily  metoprolol tartrate 25 milliGRAM(s) Oral two times a day  warfarin 7.5 milliGRAM(s) Oral once    IMAGING:  EXAM:  CT BRAIN:  04/20/2018    IMPRESSION: mild periventricular white matter ischemia. Small subacute infarction in the LEFT basal ganglia is noted with suggestion of faint internal hyperdensity which may reflect a punctate focus of hemorrhage.       DVT Prophylaxis:  LMWH                   ( X )  Heparin SQ           (  )  Coumadin             ( x )  Xarelto                  (  )  Eliquis                   (  )  Venodynes           (x  )  Ambulation          (x  )  UFH                       (  )  Contraindicated  (  )

## 2018-04-27 LAB
INR BLD: 3.27 RATIO — HIGH (ref 0.88–1.16)
PROTHROM AB SERPL-ACNC: 36.2 SEC — HIGH (ref 9.8–12.7)

## 2018-04-27 PROCEDURE — 99233 SBSQ HOSP IP/OBS HIGH 50: CPT

## 2018-04-27 RX ORDER — WARFARIN SODIUM 2.5 MG/1
2.5 TABLET ORAL ONCE
Qty: 0 | Refills: 0 | Status: DISCONTINUED | OUTPATIENT
Start: 2018-04-27 | End: 2018-04-27

## 2018-04-27 RX ORDER — WARFARIN SODIUM 2.5 MG/1
1 TABLET ORAL ONCE
Qty: 0 | Refills: 0 | Status: COMPLETED | OUTPATIENT
Start: 2018-04-27 | End: 2018-04-27

## 2018-04-27 RX ADMIN — WARFARIN SODIUM 1 MILLIGRAM(S): 2.5 TABLET ORAL at 21:06

## 2018-04-27 RX ADMIN — Medication 100 MICROGRAM(S): at 06:06

## 2018-04-27 RX ADMIN — PIPERACILLIN AND TAZOBACTAM 25 GRAM(S): 4; .5 INJECTION, POWDER, LYOPHILIZED, FOR SOLUTION INTRAVENOUS at 21:06

## 2018-04-27 RX ADMIN — Medication 81 MILLIGRAM(S): at 13:13

## 2018-04-27 RX ADMIN — PIPERACILLIN AND TAZOBACTAM 25 GRAM(S): 4; .5 INJECTION, POWDER, LYOPHILIZED, FOR SOLUTION INTRAVENOUS at 06:04

## 2018-04-27 RX ADMIN — ATORVASTATIN CALCIUM 80 MILLIGRAM(S): 80 TABLET, FILM COATED ORAL at 21:06

## 2018-04-27 RX ADMIN — Medication 25 MILLIGRAM(S): at 18:40

## 2018-04-27 RX ADMIN — PIPERACILLIN AND TAZOBACTAM 25 GRAM(S): 4; .5 INJECTION, POWDER, LYOPHILIZED, FOR SOLUTION INTRAVENOUS at 13:13

## 2018-04-27 RX ADMIN — Medication 600 MILLIGRAM(S): at 06:06

## 2018-04-27 NOTE — PROGRESS NOTE ADULT - SUBJECTIVE AND OBJECTIVE BOX
Date of service: 04-27-18 @ 09:27    pt seen and examined  still w/ nonproductive cough  no fevers  laying in bed      ROS: no fever or chills; denies dizziness, no HA, no abdominal pain, no diarrhea or constipation; no dysuria, no urinary frequency, no legs pain, no rashes    MEDICATIONS  (STANDING):  aspirin enteric coated 81 milliGRAM(s) Oral daily  atorvastatin 80 milliGRAM(s) Oral at bedtime  enoxaparin Injectable 40 milliGRAM(s) SubCutaneous daily  ipratropium  for Nebulization. 500 MICROGram(s) Nebulizer once  levothyroxine 100 MICROGram(s) Oral daily  metoprolol tartrate 25 milliGRAM(s) Oral two times a day  piperacillin/tazobactam IVPB. 3.375 Gram(s) IV Intermittent every 8 hours  warfarin 5 milliGRAM(s) Oral daily      Vital Signs Last 24 Hrs  T(C): 36.4 (27 Apr 2018 04:24), Max: 37.2 (26 Apr 2018 21:42)  T(F): 97.5 (27 Apr 2018 04:24), Max: 99 (26 Apr 2018 21:42)  HR: 87 (27 Apr 2018 04:24) (74 - 102)  BP: 105/56 (27 Apr 2018 04:24) (105/56 - 118/63)  BP(mean): --  RR: 17 (27 Apr 2018 04:24) (17 - 18)  SpO2: 99% (27 Apr 2018 04:24) (92% - 99%)          PE:  Constitutional: frail looking  HEENT: NC/AT, EOMI, PERRLA, conjunctivae clear; ears and nose atraumatic; pharynx benign  Neck: supple; thyroid not palpable  Back: no tenderness  Respiratory: decreased breath sounds, rhonchi  Cardiovascular: S1S2 regular, no murmurs  Abdomen: soft, not tender, not distended, positive BS; liver and spleen WNL  Genitourinary: no suprapubic tenderness  Lymphatic: no LN palpable  Musculoskeletal: no muscle tenderness, no joint swelling or tenderness  Extremities: no pedal edema  Neurological/ Psychiatric: AxOx3, Judgement and insight normal;  moving all extremities  Skin: bruising along extremities, no palpable lesions    Labs: all available labs reviewed                                   11.5   9.78  )-----------( 220      ( 26 Apr 2018 05:59 )             34.9     04-26    142  |  107  |  19  ----------------------------<  86  3.8   |  26  |  0.74    Ca    8.0<L>      26 Apr 2018 05:59             Cultures:    Culture - Blood (04.25.18 @ 15:39)    Specimen Source: .Blood Blood-Peripheral aerobic bottle only    Culture Results:   No growth to date.        Radiology: all available radiological tests reviewed  < from: CT Angio Chest PE Protocol w/ IV Cont (04.25.18 @ 14:31) >    EXAM:  CT ANGIO CHEST PE PROTOCOL IC                            PROCEDURE DATE:  04/25/2018          INTERPRETATION:  CT ANGIO CHEST PE PROTOCOL IC    HISTORY:  Shortness of breath, cough, atrial fibrillation    TECHNIQUE: Contrast enhanced CT pulmonary angiogram was performed.   Multiplanar CT and HRCT images were reviewed. Maximum intensity   projection (MIP) images are reconstructed as per CT angiography protocol.   Images were acquired during the administration of 90 cc Omnipaque 350 IV   contrast. This study was performed using automatic exposure control   (radiation dose reduction software) to obtain a diagnostic image quality   scan with patient dose as low as reasonably achievable.    COMPARISON: Chest x-ray one day prior    PULMONARY ARTERIES: No pulmonary embolism to the segmental branches.   Limited visualization of subsegmental branches secondary to respiratory   motion. Enlargement of the main pulmonary artery up to 3.9 cm.    LUNGS, AIRWAYS: The central airways are patent.There is patchy   consolidation in both lower lobes and left upper lobe.    PLEURA: Trace right pleural effusion.    HEART AND VESSELS: Mild cardiomegaly. No pericardial effusion. Coronary   and aortic valve calcification. Severe calcification of themitral   annulus. Aortic atherosclerosis without aneurysm.    MEDIASTINUM, DONALDO, AXILLAE: No adenopathy.    UPPER ABDOMEN: Limited visualization is unremarkable.    BONES AND CHEST WALL: No acute bony abnormality.    IMPRESSION:     No pulmonary embolism to the segmental branches. Limited visualization of   subsegmental branches secondary to respiratory motion.     Enlargement of the main pulmonary artery up to 3.9 cm. Correlate for   pulmonary arterial hypertension.    Small patchy consolidationin both lower lobes and left upper lobe,   likely infection.        Advanced directives addressed: full resuscitation

## 2018-04-27 NOTE — PROGRESS NOTE ADULT - SUBJECTIVE AND OBJECTIVE BOX
cc: fall and weakness    HPI: 87 year old Woman with pmhx HTN, hypothyroidism, scarlet fever as child, h/o osteomyelitis in left elbow and right knee s/p multiple surgeries as child now presenting after having fall at home. Patient states at roughly 4:30 AM on day of admission as she was getting out of bed to go to bathroom, getting dizzy and falling to ground. She reports no head trauma during the event. She states she was on floor for 2 hours, and had difficulty getting up to her feet.   A phone was noted nearby and she called 911. She notes during her time on the floor having noted right arm weakness  and leg weakness that was intermittent but progressively worsening. She notes having falls in the past due to "2 bum knees" and having difficulty getting up due to her joint issues. In ED, it was noted that patient had atrial fibrillation rhythm and CTA head demonstrated occlusion with cut off of the mid left M1 segment with a 1 cm thrombus and high grade stenosis of left carotid artery. Dr. Cook/Hugh Stroke physician was called by ED attending for probable neurovascular intervention but was deemed to be not a candidate by Dr. Cook due to age, lesion location and NIHSS score. She is presently laying in bed, resting comfortably, concerned regarding her arm and leg weakness but otherwise reports no dizziness, headache, vision changes. (14 Apr 2018 14:51)    4/19: No complaints, feeling well. Awaiting therapeutic INR.   4/20: had LBM overnight x5 after taking miralax; had red stool d/t her eating beets for dinner; denies CP/palpitations/SOB; no abd pain/n/v/f/c; concern that she will have to get a new PCP (she's not clear as to why she thinks Paul does not desire to be her PCP)  4/21: States she doesn't feel good but admits it is very non-specific.    4/22: No new issues, status quo.  INR therapeutic. Awaiting discharge to rehab.  4/23: discharge to rehab; but refused  4/24: continues to refuse discharge; c/o frequent cough with sputum production denies fever, chills, has chest discomfort with frequent cough, otherwise no CP  04/25/18: Patient seen and examined. Complaining of cough and sob. CT chest today-multi focal pneumonia. Discussed with patient in length regarding management and d/c plan. Called son and left message.   04/26/18: Patient seen and examined. SOB better today, still with cough. Discussed with patient in length regarding management and d/c plan.   04/27/18: Patient seen and examined. No new complaints. Cough improving. No more fever.     Review of system- rest of review of systems are negative except as mentioned in HPI    Vital Signs Last 24 Hrs  T(C): 36.6 (27 Apr 2018 10:35), Max: 37.2 (26 Apr 2018 21:42)  T(F): 97.8 (27 Apr 2018 10:35), Max: 99 (26 Apr 2018 21:42)  HR: 89 (27 Apr 2018 10:35) (74 - 102)  BP: 116/51 (27 Apr 2018 10:35) (105/56 - 118/63)  BP(mean): 67 (27 Apr 2018 10:35) (67 - 67)  RR: 18 (27 Apr 2018 10:35) (17 - 18)  SpO2: 100% (27 Apr 2018 10:35) (92% - 100%)      PHYSICAL EXAM:    GENERAL: frail, NAD   HEAD:  Atraumatic, Normocephalic  EYES: EOMI, PERRLA, conjunctiva and sclera clear  HEENT: Moist mucous membranes  NECK: Supple, No JVD  NERVOUS SYSTEM:  Alert & Oriented X3,  R side weakness   CHEST/LUNG: Clear to auscultation bilaterally; No rales, rhonchi, wheezing, or rubs, frequent cough during assessment with small amt of pale yellow thick sputum  HEART:S1S2 normal  ABDOMEN: Soft, Nontender, Nondistended; Bowel sounds present  GENITOURINARY- Voiding, no palpable bladder  EXTREMITIES:  2+ Peripheral Pulses, No clubbing, cyanosis, or edema  MUSCULOSKELETAL  ability to lift RLE improved; now with active ROM to RUE; chronic deformity to LUE  SKIN-no rash, no lesion  PSYCH- very talkative with prolonged conversation, mood stable  LYMPH Node- No palpable lymph node          LABS                                         11.5   9.78  )-----------( 220      ( 26 Apr 2018 05:59 )             34.9     26 Apr 2018 05:59    142    |  107    |  19     ----------------------------<  86     3.8     |  26     |  0.74     Ca    8.0        26 Apr 2018 05:59        PT/INR - ( 27 Apr 2018 06:29 )   PT: 36.2 sec;   INR: 3.27 ratio                           MEDICATIONS:         MEDICATIONS  (STANDING):  aspirin enteric coated 81 milliGRAM(s) Oral daily  atorvastatin 80 milliGRAM(s) Oral at bedtime  enoxaparin Injectable 40 milliGRAM(s) SubCutaneous daily  guaiFENesin  milliGRAM(s) Oral every 12 hours  ipratropium  for Nebulization. 500 MICROGram(s) Nebulizer once  levothyroxine 100 MICROGram(s) Oral daily  metoprolol tartrate 25 milliGRAM(s) Oral two times a day  piperacillin/tazobactam IVPB. 3.375 Gram(s) IV Intermittent every 8 hours    MEDICATIONS  (PRN):  acetaminophen   Tablet 650 milliGRAM(s) Oral every 6 hours PRN For Temp greater than 38 C (100.4 F)  acetaminophen   Tablet. 650 milliGRAM(s) Oral every 6 hours PRN Mild Pain (1 - 3)

## 2018-04-27 NOTE — PROGRESS NOTE ADULT - ASSESSMENT
87 year old female with pmhx HTN, hypothyroidism, scarlet fever as child, h/o osteomyelitis in left elbow and right knee s/p multiple surgeries as child now presenting after having fall at home on 4/14 found to have acute CVA, hospital course c/b worsening cough/sob/malaise, CTA chest done 4/25 showing multifocal pna/hcap, she was given IV zosyn.     1. multifocal pna/HCAP/acute CVA  - improving  - on zosyn [id number] for resistant gnr/anaroebic/strep/mssa coverage #3  - continue with antibiotic coverage  - check sputum cx, blood cx no growth  - monitor temps  - tolerating abx well so far; no side effects noted  - reason for abx use and side effects reviewed with patient  - supportive care  - f/u cbc    2. other issues - CVA, afib - care per medicine

## 2018-04-27 NOTE — PROGRESS NOTE ADULT - ASSESSMENT
A 88 yo female with new onset afib/flutter Vidal vasc #5, embolic CVA    Plan:  ::Decrease Warfarin to 1mg PO tonight, ok with INR 3.2  ::Dc Lovenox SQ  ::Daily PT/INR/CBC/BMP      Will continue to follow.

## 2018-04-27 NOTE — PROGRESS NOTE ADULT - ASSESSMENT
Assessment and Plan:  87 year old Woman with pmhx HTN, hypothyroidism, scarlet fever as child, h/o osteomyelitis in left elbow and right knee s/p multiple surgeries as child now presenting after having fall at home found to have acute CVA.    Acute CVA:   - con't statin  - echo noted- severe AS and sever MS -out pt management   - off aspirin,  while on coumadin.    Afib- rate controlled  INR a little supratherapeutic today   -con't  metoprolol    -monitor daily INR     Multi focal pneumonia  Follow blood cultures. No growth  Continue IV zosyn  ID consult appreciated    ICA stensosis:   Dr. Alexander follow up appreciated, out pt management at [facility]     Dispo    To rehab once medically stable

## 2018-04-27 NOTE — PROGRESS NOTE ADULT - SUBJECTIVE AND OBJECTIVE BOX
HPI: This is a 87 year old female with pmhx HTN, hypothyroidism, scarlet fever as child, h/o osteomyelitis in left elbow and right knee s/p multiple surgeries as child now presenting after having fall at home. Patient states at roughly 4:30 AM this morning as she was getting out of bed to go to bathroom, getting dizzy and falling to ground. She reports no head trauma during the event. She states she was on floor for 2 hours, and had difficulty getting up to her feet. A phone was noted nearby and she called 911. She notes during her time on the floor having noted right arm weakness  and leg weakness that was intermittent but progressively worsening. She notes having falls in the past due to "2 bum knees" and having difficulty getting up due to her joint issues. She states yesterday feeling well and reports no weakness, dizziness, palpitations, chest pain or shortness of breath. In ED, it was noted that patient had atrial fibrillation rhythm and CTA head demonstrated occlusion with cut off of the mid left M1 segment with a 1 cm thrombus and high grade stenosis of left carotid artery. Dr. Cook/Hugh Stroke physician was called by ED attending for probable neurovascular intervention but was deemed to be not a candidate by Dr. Cook due to age, lesion location and NIHSS score. She is presently laying in bed, resting comfortably, concerned regarding her arm and leg weakness but otherwise reports no dizziness, headache, vision changes. (14 Apr 2018 14:51)    Patient is a 87y old  Female who presents with a chief complaint of fall and weakness (14 Apr 2018 14:51)    Consulted by Dr. Chambers  for VTE prophylaxis, risk stratification, and anticoagulation management.    PAST MEDICAL & SURGICAL HISTORY:  Essential hypertension  Chronic multifocal osteomyelitis, other site  Scarlet fever  S/P debridement    CrCl:40.29    IMPROVE VTE Risk Score: #5    [id number] Score: #6    IMPROVE Bleeding Risk Score    Falls Risk:   High (x  )  Mod (  )  Low (  )    4/21: Patient seen at bedside, discussed current INR from 1.49 to 2.72- brisk rise. Will hold coumadin tonight. Patient verbalizes understanding- explains that she doesn't feel well today- tired- and wants to work with PT. Needs OT for hand strength and dexterity.  4/22: Patient seen at bedside- resting comfortable- did work with PT yesterday. Coumadin held yesterday- INR 2.90 this am (rising) therefore will hold again tonight.  Stable, no changes in dispo.  4-23-18 Pt seen at bedside. discussed her anticoagulation with coumadin, questions answered.  Pt concerned about going to rehab.   4-24-18 pt seen at bedside helped oob to chair was sitting n edge of bed.  Dr Malhotra present.  Discussed her anticoagulation with coumadin and starting Lovenox until inr is 2 or greater.  Pt has no concerns about anticoagulation  She states she has concerns about going to rehab and she signed a appeal.  4/25/18: Pt seen at bedside, talking on phone. Informed pt of her INR result, brisk drop and will continue with same dose.   4/26: seen at bedside, states " my numbers are up but I feel lousy"  4/27: Patient seen at bedside-INR 3.2 today.     FAMILY HISTORY:  Family history of heart disease (Father, Mother, Sibling)    Denies any personal or familial history of clotting or bleeding disorders.    Allergies    No Known Allergies    Intolerances    REVIEW OF SYSTEMS    (  )Fever	     (  )Constipation	(  )SOB				(  )Headache	(  )Dysuria  (  )Chills	     (  )Melena	(  )Dyspnea present on exertion	                    (  )Dizziness                    (  )Polyuria  (  )Nausea	     (  )Hematochezia	(  )Cough			                    (  )Syncope   	(  )Hematuria  (  )Vomiting    (  )Chest Pain	(  )Wheezing			(  )Weakness  (  )Diarrhea     (  )Palpitations	(  )Anorexia			(  )Myalgia    All other review of systems negative: Yes    PHYSICAL EXAM:    Constitutional: Appears Well    Neurological: A& O x 3, STEELE  RLE <LLE    Skin: Warm    Respiratory and Thorax: normal effort; Breath sounds: normal; No rales/wheezing/rhonchi  	  Cardiovascular: S1, S2, irregular, NMBR	MP Aflutter    Gastrointestinal: BS + x 4Q, nontender	    Genitourinary:  Bladder nondistended, nontender    Musculoskeletal:   General Right:   no muscle/joint tenderness,   normal tone, no joint swelling,   ROM: limited	    General Left:   no muscle/joint tenderness,   normal tone, no joint swelling,   ROM: full    Lower extrems:   Right: no calf tenderness              negative jojo's sign               + pedal pulses    Left:   no calf tenderness              negative jojo's sign               + pedal pulses                          11.5   9.78  )-----------( 220      ( 26 Apr 2018 05:59 )             34.9       04-26    142  |  107  |  19  ----------------------------<  86  3.8   |  26  |  0.74    Ca    8.0<L>      26 Apr 2018 05:59        PT/INR - ( 27 Apr 2018 06:29 )   PT: 36.2 sec;   INR: 3.27 ratio    PT/INR - ( 26 Apr 2018 09:03 )   PT: 25.2 sec;   INR: 2.29 ratio    PT/INR - ( 25 Apr 2018 06:25 )   PT: 17.0 sec;   INR: 1.56 ratio    PT/INR - ( 24 Apr 2018 05:25 )   PT: 17.8 sec;   INR: 1.63 ratio   PT/INR - ( 23 Apr 2018 06:40 )   PT: 21.4 sec;   INR: 1.95 ratio  PT/INR - ( 22 Apr 2018 06:01 )   PT: 32.0 sec;   INR: 2.90 ratio    PT/INR - ( 21 Apr 2018 05:51 )   PT: 30.0 sec;   INR: 2.72 ratio    PT/INR - ( 20 Apr 2018 04:06 )   PT: 16.2 sec;   INR: 1.49 ratio      MEDICATIONS  (STANDING):  aspirin enteric coated 81 milliGRAM(s) Oral daily  atorvastatin 80 milliGRAM(s) Oral at bedtime  ipratropium  for Nebulization. 500 MICROGram(s) Nebulizer once  levothyroxine 100 MICROGram(s) Oral daily  metoprolol tartrate 25 milliGRAM(s) Oral two times a day  piperacillin/tazobactam IVPB. 3.375 Gram(s) IV Intermittent every 8 hours  warfarin 1 milliGRAM(s) Oral once    IMAGING:  EXAM:  CT BRAIN:  04/20/2018    IMPRESSION: mild periventricular white matter ischemia. Small subacute infarction in the LEFT basal ganglia is noted with suggestion of faint internal hyperdensity which may reflect a punctate focus of hemorrhage.       DVT Prophylaxis:  LMWH                   (  )  Heparin SQ           (  )  Coumadin             ( x )  Xarelto                  (  )  Eliquis                   (  )  Venodynes           (x  )  Ambulation          (x  )  UFH                       (  )  Contraindicated  (  )

## 2018-04-28 LAB
ANION GAP SERPL CALC-SCNC: 7 MMOL/L — SIGNIFICANT CHANGE UP (ref 5–17)
BUN SERPL-MCNC: 10 MG/DL — SIGNIFICANT CHANGE UP (ref 7–23)
CALCIUM SERPL-MCNC: 8.5 MG/DL — SIGNIFICANT CHANGE UP (ref 8.5–10.1)
CHLORIDE SERPL-SCNC: 105 MMOL/L — SIGNIFICANT CHANGE UP (ref 96–108)
CO2 SERPL-SCNC: 28 MMOL/L — SIGNIFICANT CHANGE UP (ref 22–31)
CREAT SERPL-MCNC: 0.69 MG/DL — SIGNIFICANT CHANGE UP (ref 0.5–1.3)
GLUCOSE SERPL-MCNC: 75 MG/DL — SIGNIFICANT CHANGE UP (ref 70–99)
HCT VFR BLD CALC: 37.3 % — SIGNIFICANT CHANGE UP (ref 34.5–45)
HGB BLD-MCNC: 12.5 G/DL — SIGNIFICANT CHANGE UP (ref 11.5–15.5)
INR BLD: 2.88 RATIO — HIGH (ref 0.88–1.16)
MCHC RBC-ENTMCNC: 30 PG — SIGNIFICANT CHANGE UP (ref 27–34)
MCHC RBC-ENTMCNC: 33.5 GM/DL — SIGNIFICANT CHANGE UP (ref 32–36)
MCV RBC AUTO: 89.4 FL — SIGNIFICANT CHANGE UP (ref 80–100)
NRBC # BLD: 0 /100 WBCS — SIGNIFICANT CHANGE UP (ref 0–0)
PLATELET # BLD AUTO: 195 K/UL — SIGNIFICANT CHANGE UP (ref 150–400)
POTASSIUM SERPL-MCNC: 3.3 MMOL/L — LOW (ref 3.5–5.3)
POTASSIUM SERPL-SCNC: 3.3 MMOL/L — LOW (ref 3.5–5.3)
PROTHROM AB SERPL-ACNC: 31.8 SEC — HIGH (ref 9.8–12.7)
RBC # BLD: 4.17 M/UL — SIGNIFICANT CHANGE UP (ref 3.8–5.2)
RBC # FLD: 13.4 % — SIGNIFICANT CHANGE UP (ref 10.3–14.5)
SODIUM SERPL-SCNC: 140 MMOL/L — SIGNIFICANT CHANGE UP (ref 135–145)
WBC # BLD: 6 K/UL — SIGNIFICANT CHANGE UP (ref 3.8–10.5)
WBC # FLD AUTO: 6 K/UL — SIGNIFICANT CHANGE UP (ref 3.8–10.5)

## 2018-04-28 RX ORDER — WARFARIN SODIUM 2.5 MG/1
5 TABLET ORAL DAILY
Qty: 0 | Refills: 0 | Status: DISCONTINUED | OUTPATIENT
Start: 2018-04-28 | End: 2018-04-29

## 2018-04-28 RX ORDER — ALBUTEROL 90 UG/1
2.5 AEROSOL, METERED ORAL EVERY 6 HOURS
Qty: 0 | Refills: 0 | Status: DISCONTINUED | OUTPATIENT
Start: 2018-04-28 | End: 2018-04-30

## 2018-04-28 RX ORDER — ALBUTEROL 90 UG/1
1 AEROSOL, METERED ORAL EVERY 4 HOURS
Qty: 0 | Refills: 0 | Status: DISCONTINUED | OUTPATIENT
Start: 2018-04-28 | End: 2018-04-30

## 2018-04-28 RX ORDER — POTASSIUM CHLORIDE 20 MEQ
40 PACKET (EA) ORAL EVERY 4 HOURS
Qty: 0 | Refills: 0 | Status: COMPLETED | OUTPATIENT
Start: 2018-04-28 | End: 2018-04-28

## 2018-04-28 RX ADMIN — Medication 25 MILLIGRAM(S): at 06:04

## 2018-04-28 RX ADMIN — Medication 100 MICROGRAM(S): at 06:04

## 2018-04-28 RX ADMIN — ALBUTEROL 2.5 MILLIGRAM(S): 90 AEROSOL, METERED ORAL at 14:32

## 2018-04-28 RX ADMIN — Medication 40 MILLIEQUIVALENT(S): at 21:54

## 2018-04-28 RX ADMIN — Medication 25 MILLIGRAM(S): at 18:07

## 2018-04-28 RX ADMIN — PIPERACILLIN AND TAZOBACTAM 25 GRAM(S): 4; .5 INJECTION, POWDER, LYOPHILIZED, FOR SOLUTION INTRAVENOUS at 15:45

## 2018-04-28 RX ADMIN — Medication 81 MILLIGRAM(S): at 12:40

## 2018-04-28 RX ADMIN — PIPERACILLIN AND TAZOBACTAM 25 GRAM(S): 4; .5 INJECTION, POWDER, LYOPHILIZED, FOR SOLUTION INTRAVENOUS at 06:03

## 2018-04-28 RX ADMIN — ATORVASTATIN CALCIUM 80 MILLIGRAM(S): 80 TABLET, FILM COATED ORAL at 21:54

## 2018-04-28 RX ADMIN — Medication 40 MILLIEQUIVALENT(S): at 15:45

## 2018-04-28 RX ADMIN — WARFARIN SODIUM 5 MILLIGRAM(S): 2.5 TABLET ORAL at 21:54

## 2018-04-28 RX ADMIN — ALBUTEROL 2.5 MILLIGRAM(S): 90 AEROSOL, METERED ORAL at 20:01

## 2018-04-28 RX ADMIN — PIPERACILLIN AND TAZOBACTAM 25 GRAM(S): 4; .5 INJECTION, POWDER, LYOPHILIZED, FOR SOLUTION INTRAVENOUS at 21:55

## 2018-04-28 NOTE — PROGRESS NOTE ADULT - ASSESSMENT
Assessment and Plan:  87 year old Woman with pmhx HTN, hypothyroidism, scarlet fever as child, h/o osteomyelitis in left elbow and right knee s/p multiple surgeries as child now presenting after having fall at home found to have acute CVA.    Acute CVA:   - con't statin  - echo noted- severe AS and sever MS -out pt management   - off aspirin,  while on coumadin.    Afib- rate controlled  INR therapeutic today. Continue  coumadin   -con't  metoprolol    -monitor daily INR     Multi focal pneumonia  Follow blood cultures. No growth  Continue IV zosyn  ID follow up appreciated    ICA stensosis:   Dr. Alexander follow up appreciated, out pt management at [facility]     Dispo    To rehab once medically stable

## 2018-04-28 NOTE — PROGRESS NOTE ADULT - SUBJECTIVE AND OBJECTIVE BOX
cc: fall and weakness    HPI: 87 year old Woman with pmhx HTN, hypothyroidism, scarlet fever as child, h/o osteomyelitis in left elbow and right knee s/p multiple surgeries as child now presenting after having fall at home. Patient states at roughly 4:30 AM on day of admission as she was getting out of bed to go to bathroom, getting dizzy and falling to ground. She reports no head trauma during the event. She states she was on floor for 2 hours, and had difficulty getting up to her feet.   A phone was noted nearby and she called 911. She notes during her time on the floor having noted right arm weakness  and leg weakness that was intermittent but progressively worsening. She notes having falls in the past due to "2 bum knees" and having difficulty getting up due to her joint issues. In ED, it was noted that patient had atrial fibrillation rhythm and CTA head demonstrated occlusion with cut off of the mid left M1 segment with a 1 cm thrombus and high grade stenosis of left carotid artery. Dr. Cook/Hugh Stroke physician was called by ED attending for probable neurovascular intervention but was deemed to be not a candidate by Dr. Cook due to age, lesion location and NIHSS score. She is presently laying in bed, resting comfortably, concerned regarding her arm and leg weakness but otherwise reports no dizziness, headache, vision changes. (14 Apr 2018 14:51)    4/19: No complaints, feeling well. Awaiting therapeutic INR.   4/20: had LBM overnight x5 after taking miralax; had red stool d/t her eating beets for dinner; denies CP/palpitations/SOB; no abd pain/n/v/f/c; concern that she will have to get a new PCP (she's not clear as to why she thinks Paul does not desire to be her PCP)  4/21: States she doesn't feel good but admits it is very non-specific.    4/22: No new issues, status quo.  INR therapeutic. Awaiting discharge to rehab.  4/23: discharge to rehab; but refused  4/24: continues to refuse discharge; c/o frequent cough with sputum production denies fever, chills, has chest discomfort with frequent cough, otherwise no CP  04/25/18: Patient seen and examined. Complaining of cough and sob. CT chest today-multi focal pneumonia. Discussed with patient in length regarding management and d/c plan. Called son and left message.   04/26/18: Patient seen and examined. SOB better today, still with cough. Discussed with patient in length regarding management and d/c plan.   04/27/18: Patient seen and examined. No new complaints. Cough improving. No more fever.   04/28/18: Patient seen and examined. Complaining of mild hemoptysis, otherwise stable.     Review of system- rest of review of systems are negative except as mentioned in HPI    Vital Signs Last 24 Hrs  T(C): 36.4 (28 Apr 2018 11:08), Max: 36.6 (27 Apr 2018 17:22)  T(F): 97.6 (28 Apr 2018 11:08), Max: 97.8 (27 Apr 2018 17:22)  HR: 87 (28 Apr 2018 11:08) (78 - 97)  BP: 121/58 (28 Apr 2018 11:08) (112/49 - 123/68)  BP(mean): --  RR: 18 (28 Apr 2018 11:08) (18 - 18)  SpO2: 100% (28 Apr 2018 11:08) (96% - 100%)        PHYSICAL EXAM:    GENERAL: frail, NAD   HEAD:  Atraumatic, Normocephalic  EYES: EOMI, PERRLA, conjunctiva and sclera clear  HEENT: Moist mucous membranes  NECK: Supple, No JVD  NERVOUS SYSTEM:  Alert & Oriented X3,  R side weakness   CHEST/LUNG: Clear to auscultation bilaterally; No rales, rhonchi, wheezing, or rubs, frequent cough during assessment with small amt of pale yellow thick sputum  HEART:S1S2 normal  ABDOMEN: Soft, Nontender, Nondistended; Bowel sounds present  GENITOURINARY- Voiding, no palpable bladder  EXTREMITIES:  2+ Peripheral Pulses, No clubbing, cyanosis, or edema  MUSCULOSKELETAL  ability to lift RLE improved; now with active ROM to RUE; chronic deformity to LUE  SKIN-no rash, no lesion  PSYCH- very talkative with prolonged conversation, mood stable  LYMPH Node- No palpable lymph node          LABS                                                       12.5   6.00  )-----------( 195      ( 28 Apr 2018 06:01 )             37.3     28 Apr 2018 06:01    140    |  105    |  10     ----------------------------<  75     3.3     |  28     |  0.69     Ca    8.5        28 Apr 2018 06:01        PT/INR - ( 28 Apr 2018 06:01 )   PT: 31.8 sec;   INR: 2.88 ratio           CAPILLARY BLOOD GLUCOSE        MEDICATIONS:         MEDICATIONS  (STANDING):  aspirin enteric coated 81 milliGRAM(s) Oral daily  atorvastatin 80 milliGRAM(s) Oral at bedtime  enoxaparin Injectable 40 milliGRAM(s) SubCutaneous daily  guaiFENesin  milliGRAM(s) Oral every 12 hours  ipratropium  for Nebulization. 500 MICROGram(s) Nebulizer once  levothyroxine 100 MICROGram(s) Oral daily  metoprolol tartrate 25 milliGRAM(s) Oral two times a day  piperacillin/tazobactam IVPB. 3.375 Gram(s) IV Intermittent every 8 hours    MEDICATIONS  (PRN):  acetaminophen   Tablet 650 milliGRAM(s) Oral every 6 hours PRN For Temp greater than 38 C (100.4 F)  acetaminophen   Tablet. 650 milliGRAM(s) Oral every 6 hours PRN Mild Pain (1 - 3)

## 2018-04-29 LAB
INR BLD: 2.51 RATIO — HIGH (ref 0.88–1.16)
PROTHROM AB SERPL-ACNC: 27.6 SEC — HIGH (ref 9.8–12.7)

## 2018-04-29 PROCEDURE — 99233 SBSQ HOSP IP/OBS HIGH 50: CPT

## 2018-04-29 RX ORDER — WARFARIN SODIUM 2.5 MG/1
2.5 TABLET ORAL DAILY
Qty: 0 | Refills: 0 | Status: DISCONTINUED | OUTPATIENT
Start: 2018-04-29 | End: 2018-04-30

## 2018-04-29 RX ADMIN — PIPERACILLIN AND TAZOBACTAM 25 GRAM(S): 4; .5 INJECTION, POWDER, LYOPHILIZED, FOR SOLUTION INTRAVENOUS at 21:20

## 2018-04-29 RX ADMIN — Medication 100 MICROGRAM(S): at 06:09

## 2018-04-29 RX ADMIN — PIPERACILLIN AND TAZOBACTAM 25 GRAM(S): 4; .5 INJECTION, POWDER, LYOPHILIZED, FOR SOLUTION INTRAVENOUS at 13:51

## 2018-04-29 RX ADMIN — ALBUTEROL 2.5 MILLIGRAM(S): 90 AEROSOL, METERED ORAL at 07:55

## 2018-04-29 RX ADMIN — PIPERACILLIN AND TAZOBACTAM 25 GRAM(S): 4; .5 INJECTION, POWDER, LYOPHILIZED, FOR SOLUTION INTRAVENOUS at 06:08

## 2018-04-29 RX ADMIN — ALBUTEROL 2.5 MILLIGRAM(S): 90 AEROSOL, METERED ORAL at 20:11

## 2018-04-29 RX ADMIN — Medication 81 MILLIGRAM(S): at 11:39

## 2018-04-29 RX ADMIN — ATORVASTATIN CALCIUM 80 MILLIGRAM(S): 80 TABLET, FILM COATED ORAL at 21:20

## 2018-04-29 RX ADMIN — Medication 25 MILLIGRAM(S): at 06:09

## 2018-04-29 RX ADMIN — Medication 25 MILLIGRAM(S): at 17:29

## 2018-04-29 RX ADMIN — WARFARIN SODIUM 2.5 MILLIGRAM(S): 2.5 TABLET ORAL at 21:20

## 2018-04-29 NOTE — PROGRESS NOTE ADULT - SUBJECTIVE AND OBJECTIVE BOX
HPI: This is a 87 year old female with pmhx HTN, hypothyroidism, scarlet fever as child, h/o osteomyelitis in left elbow and right knee s/p multiple surgeries as child now presenting after having fall at home. Patient states at roughly 4:30 AM this morning as she was getting out of bed to go to bathroom, getting dizzy and falling to ground. She reports no head trauma during the event. She states she was on floor for 2 hours, and had difficulty getting up to her feet. A phone was noted nearby and she called 911. She notes during her time on the floor having noted right arm weakness  and leg weakness that was intermittent but progressively worsening. She notes having falls in the past due to "2 bum knees" and having difficulty getting up due to her joint issues. She states yesterday feeling well and reports no weakness, dizziness, palpitations, chest pain or shortness of breath. In ED, it was noted that patient had atrial fibrillation rhythm and CTA head demonstrated occlusion with cut off of the mid left M1 segment with a 1 cm thrombus and high grade stenosis of left carotid artery. Dr. Cook/Hugh Stroke physician was called by ED attending for probable neurovascular intervention but was deemed to be not a candidate by Dr. Cook due to age, lesion location and NIHSS score. She is presently laying in bed, resting comfortably, concerned regarding her arm and leg weakness but otherwise reports no dizziness, headache, vision changes. (14 Apr 2018 14:51)    Patient is a 87y old  Female who presents with a chief complaint of fall and weakness (14 Apr 2018 14:51)    Consulted by Dr. Chambers  for VTE prophylaxis, risk stratification, and anticoagulation management.    PAST MEDICAL & SURGICAL HISTORY:  Essential hypertension  Chronic multifocal osteomyelitis, other site  Scarlet fever  S/P debridement    CrCl:40.29    IMPROVE VTE Risk Score: #5    [id number] Score: #6    IMPROVE Bleeding Risk Score    Falls Risk:   High (x  )  Mod (  )  Low (  )    4/21: Patient seen at bedside, discussed current INR from 1.49 to 2.72- brisk rise. Will hold coumadin tonight. Patient verbalizes understanding- explains that she doesn't feel well today- tired- and wants to work with PT. Needs OT for hand strength and dexterity.  4/22: Patient seen at bedside- resting comfortable- did work with PT yesterday. Coumadin held yesterday- INR 2.90 this am (rising) therefore will hold again tonight.  Stable, no changes in dispo.  4-23-18 Pt seen at bedside. discussed her anticoagulation with coumadin, questions answered.  Pt concerned about going to rehab.   4-24-18 pt seen at bedside helped oob to chair was sitting n edge of bed.  Dr Malhotra present.  Discussed her anticoagulation with coumadin and starting Lovenox until inr is 2 or greater.  Pt has no concerns about anticoagulation  She states she has concerns about going to rehab and she signed a appeal.  4/25/18: Pt seen at bedside, talking on phone. Informed pt of her INR result, brisk drop and will continue with same dose.   4/26: seen at bedside, states " my numbers are up but I feel lousy"  4/27: Patient seen at bedside-INR 3.2 today.   4/29: seen at bedside,  Now has B/L PNA    FAMILY HISTORY:  Family history of heart disease (Father, Mother, Sibling)    Denies any personal or familial history of clotting or bleeding disorders.    Allergies    No Known Allergies    Intolerances    REVIEW OF SYSTEMS    (  )Fever	     (  )Constipation	(  )SOB				(  )Headache	(  )Dysuria  (  )Chills	     (  )Melena	(  )Dyspnea present on exertion	                    (  )Dizziness                    (  )Polyuria  (  )Nausea	     (  )Hematochezia	(  )Cough			                    (  )Syncope   	(  )Hematuria  (  )Vomiting    (  )Chest Pain	(  )Wheezing			(  )Weakness  (  )Diarrhea     (  )Palpitations	(  )Anorexia			(  )Myalgia    All other review of systems negative: Yes    PHYSICAL EXAM:    Constitutional: Appears Well    Neurological: A& O x 3, STEELE  RLE <LLE    Skin: Warm    Respiratory and Thorax: normal effort; Breath sounds: normal; No rales/wheezing/rhonchi  	  Cardiovascular: S1, S2, irregular, NMBR	MP Aflutter    Gastrointestinal: BS + x 4Q, nontender	    Genitourinary:  Bladder nondistended, nontender    Musculoskeletal:   General Right:   no muscle/joint tenderness,   normal tone, no joint swelling,   ROM: limited	    General Left:   no muscle/joint tenderness,   normal tone, no joint swelling,   ROM: full    Lower extrems:   Right: no calf tenderness              negative jojo's sign               + pedal pulses    Left:   no calf tenderness              negative jojo's sign               + pedal pulses                          12.5   6.00  )-----------( 195      ( 28 Apr 2018 06:01 )             37.3       04-28    140  |  105  |  10  ----------------------------<  75  3.3<L>   |  28  |  0.69    Ca    8.5      28 Apr 2018 06:01                             11.5   9.78  )-----------( 220      ( 26 Apr 2018 05:59 )             34.9       04-26    142  |  107  |  19  ----------------------------<  86  3.8   |  26  |  0.74    Ca    8.0<L>      26 Apr 2018 05:59          PT/INR - ( 29 Apr 2018 06:50 )   PT: 27.6 sec;   INR: 2.51 ratio      PT INR    ( 28, Apr 2018 06:50)   PT: 31.8 sec;   INR: 2.88 ratio  PT/INR - ( 27 Apr 2018 06:29 )   PT: 36.2 sec;   INR: 3.27 ratio    PT/INR - ( 26 Apr 2018 09:03 )   PT: 25.2 sec;   INR: 2.29 ratio    PT/INR - ( 25 Apr 2018 06:25 )   PT: 17.0 sec;   INR: 1.56 ratio    PT/INR - ( 24 Apr 2018 05:25 )   PT: 17.8 sec;   INR: 1.63 ratio   PT/INR - ( 23 Apr 2018 06:40 )   PT: 21.4 sec;   INR: 1.95 ratio  PT/INR - ( 22 Apr 2018 06:01 )   PT: 32.0 sec;   INR: 2.90 ratio    PT/INR - ( 21 Apr 2018 05:51 )   PT: 30.0 sec;   INR: 2.72 ratio    PT/INR - ( 20 Apr 2018 04:06 )   PT: 16.2 sec;   INR: 1.49 ratio      MEDICATIONS  (STANDING):  aspirin enteric coated 81 milliGRAM(s) Oral daily  atorvastatin 80 milliGRAM(s) Oral at bedtime  ipratropium  for Nebulization. 500 MICROGram(s) Nebulizer once  levothyroxine 100 MICROGram(s) Oral daily  metoprolol tartrate 25 milliGRAM(s) Oral two times a day  piperacillin/tazobactam IVPB. 3.375 Gram(s) IV Intermittent every 8 hours  warfarin 1 milliGRAM(s) Oral once    IMAGING:  EXAM:  CT BRAIN:  04/20/2018    IMPRESSION: mild periventricular white matter ischemia. Small subacute infarction in the LEFT basal ganglia is noted with suggestion of faint internal hyperdensity which may reflect a punctate focus of hemorrhage.       DVT Prophylaxis:  LMWH                   (  )  Heparin SQ           (  )  Coumadin             ( x )  Xarelto                  (  )  Eliquis                   (  )  Venodynes           (x  )  Ambulation          (x  )  UFH                       (  )  Contraindicated  (  )

## 2018-04-29 NOTE — PROGRESS NOTE ADULT - ASSESSMENT
A 86 yo female with new onset afib/flutter Vidal vasc #5, embolic CVA    Plan:  :coumadin 2.5 mg po tonight  ::Daily PT/INR/CBC/BMP      Will continue to follow.

## 2018-04-29 NOTE — PROGRESS NOTE ADULT - SUBJECTIVE AND OBJECTIVE BOX
cc: fall and weakness    HPI: 87 year old Woman with pmhx HTN, hypothyroidism, scarlet fever as child, h/o osteomyelitis in left elbow and right knee s/p multiple surgeries as child now presenting after having fall at home. Patient states at roughly 4:30 AM on day of admission as she was getting out of bed to go to bathroom, getting dizzy and falling to ground. She reports no head trauma during the event. She states she was on floor for 2 hours, and had difficulty getting up to her feet.   A phone was noted nearby and she called 911. She notes during her time on the floor having noted right arm weakness  and leg weakness that was intermittent but progressively worsening. She notes having falls in the past due to "2 bum knees" and having difficulty getting up due to her joint issues. In ED, it was noted that patient had atrial fibrillation rhythm and CTA head demonstrated occlusion with cut off of the mid left M1 segment with a 1 cm thrombus and high grade stenosis of left carotid artery. Dr. Cook/Hugh Stroke physician was called by ED attending for probable neurovascular intervention but was deemed to be not a candidate by Dr. Cook due to age, lesion location and NIHSS score. She is presently laying in bed, resting comfortably, concerned regarding her arm and leg weakness but otherwise reports no dizziness, headache, vision changes. (14 Apr 2018 14:51)    4/19: No complaints, feeling well. Awaiting therapeutic INR.   4/20: had LBM overnight x5 after taking miralax; had red stool d/t her eating beets for dinner; denies CP/palpitations/SOB; no abd pain/n/v/f/c; concern that she will have to get a new PCP (she's not clear as to why she thinks Paul does not desire to be her PCP)  4/21: States she doesn't feel good but admits it is very non-specific.    4/22: No new issues, status quo.  INR therapeutic. Awaiting discharge to rehab.  4/23: discharge to rehab; but refused  4/24: continues to refuse discharge; c/o frequent cough with sputum production denies fever, chills, has chest discomfort with frequent cough, otherwise no CP  04/25/18: Patient seen and examined. Complaining of cough and sob. CT chest today-multi focal pneumonia. Discussed with patient in length regarding management and d/c plan. Called son and left message.   04/26/18: Patient seen and examined. SOB better today, still with cough. Discussed with patient in length regarding management and d/c plan.   04/27/18: Patient seen and examined. No new complaints. Cough improving. No more fever.   04/28/18: Patient seen and examined. Complaining of mild hemoptysis, otherwise stable.   04/29/18: Patient seen and examined. No active issues.     Review of system- rest of review of systems are negative except as mentioned in HPI    Vital Signs Last 24 Hrs  T(C): 36.3 (29 Apr 2018 11:31), Max: 36.6 (29 Apr 2018 05:17)  T(F): 97.3 (29 Apr 2018 11:31), Max: 97.8 (29 Apr 2018 05:17)  HR: 79 (29 Apr 2018 11:31) (79 - 92)  BP: 113/43 (29 Apr 2018 11:31) (102/64 - 113/43)  BP(mean): --  RR: 18 (29 Apr 2018 11:31) (17 - 18)  SpO2: 99% (29 Apr 2018 11:31) (99% - 100%)        PHYSICAL EXAM:    GENERAL: frail, NAD   HEAD:  Atraumatic, Normocephalic  EYES: EOMI, PERRLA, conjunctiva and sclera clear  HEENT: Moist mucous membranes  NECK: Supple, No JVD  NERVOUS SYSTEM:  Alert & Oriented X3,  R side weakness   CHEST/LUNG: Clear to auscultation bilaterally; No rales, rhonchi, wheezing, or rubs, frequent cough during assessment with small amt of pale yellow thick sputum  HEART:S1S2 normal  ABDOMEN: Soft, Nontender, Nondistended; Bowel sounds present  GENITOURINARY- Voiding, no palpable bladder  EXTREMITIES:  2+ Peripheral Pulses, No clubbing, cyanosis, or edema  MUSCULOSKELETAL  ability to lift RLE improved; now with active ROM to RUE; chronic deformity to LUE  SKIN-no rash, no lesion  PSYCH- very talkative with prolonged conversation, mood stable  LYMPH Node- No palpable lymph node          LABS                                            12.5   6.00  )-----------( 195      ( 28 Apr 2018 06:01 )             37.3     28 Apr 2018 06:01    140    |  105    |  10     ----------------------------<  75     3.3     |  28     |  0.69     Ca    8.5        28 Apr 2018 06:01        PT/INR - ( 29 Apr 2018 06:50 )   PT: 27.6 sec;   INR: 2.51 ratio           CAPILLARY BLOOD GLUCOSE                                    MEDICATIONS:         MEDICATIONS  (STANDING):  aspirin enteric coated 81 milliGRAM(s) Oral daily  atorvastatin 80 milliGRAM(s) Oral at bedtime  enoxaparin Injectable 40 milliGRAM(s) SubCutaneous daily  guaiFENesin  milliGRAM(s) Oral every 12 hours  ipratropium  for Nebulization. 500 MICROGram(s) Nebulizer once  levothyroxine 100 MICROGram(s) Oral daily  metoprolol tartrate 25 milliGRAM(s) Oral two times a day  piperacillin/tazobactam IVPB. 3.375 Gram(s) IV Intermittent every 8 hours    MEDICATIONS  (PRN):  acetaminophen   Tablet 650 milliGRAM(s) Oral every 6 hours PRN For Temp greater than 38 C (100.4 F)  acetaminophen   Tablet. 650 milliGRAM(s) Oral every 6 hours PRN Mild Pain (1 - 3)

## 2018-04-29 NOTE — PROGRESS NOTE ADULT - ASSESSMENT
Assessment and Plan:  87 year old Woman with pmhx HTN, hypothyroidism, scarlet fever as child, h/o osteomyelitis in left elbow and right knee s/p multiple surgeries as child now presenting after having fall at home found to have acute CVA.    Acute CVA:   - con't statin  - echo noted- severe AS and sever MS -out pt management   - off aspirin,  while on coumadin.    Afib- rate controlled  INR therapeutic today. Continue  coumadin   -con't  metoprolol    -monitor daily INR     Multi focal pneumonia  Follow blood cultures. No growth  Continue IV zosyn, change to po in 1 or 2 days time.   ID follow up appreciated    ICA stensosis:   Dr. Alexander follow up appreciated, out pt management at [facility]     Dispo    To rehab once medically stable, possible in 1 or 2 days time.

## 2018-04-30 VITALS
DIASTOLIC BLOOD PRESSURE: 56 MMHG | OXYGEN SATURATION: 100 % | HEART RATE: 95 BPM | SYSTOLIC BLOOD PRESSURE: 141 MMHG | RESPIRATION RATE: 18 BRPM | TEMPERATURE: 97 F

## 2018-04-30 LAB
ANION GAP SERPL CALC-SCNC: 7 MMOL/L — SIGNIFICANT CHANGE UP (ref 5–17)
BUN SERPL-MCNC: 8 MG/DL — SIGNIFICANT CHANGE UP (ref 7–23)
CALCIUM SERPL-MCNC: 8.2 MG/DL — LOW (ref 8.5–10.1)
CHLORIDE SERPL-SCNC: 106 MMOL/L — SIGNIFICANT CHANGE UP (ref 96–108)
CO2 SERPL-SCNC: 29 MMOL/L — SIGNIFICANT CHANGE UP (ref 22–31)
CREAT SERPL-MCNC: 0.62 MG/DL — SIGNIFICANT CHANGE UP (ref 0.5–1.3)
CULTURE RESULTS: SIGNIFICANT CHANGE UP
GLUCOSE SERPL-MCNC: 91 MG/DL — SIGNIFICANT CHANGE UP (ref 70–99)
HCT VFR BLD CALC: 35.9 % — SIGNIFICANT CHANGE UP (ref 34.5–45)
HGB BLD-MCNC: 11.8 G/DL — SIGNIFICANT CHANGE UP (ref 11.5–15.5)
INR BLD: 3.01 RATIO — HIGH (ref 0.88–1.16)
MCHC RBC-ENTMCNC: 29.4 PG — SIGNIFICANT CHANGE UP (ref 27–34)
MCHC RBC-ENTMCNC: 32.9 GM/DL — SIGNIFICANT CHANGE UP (ref 32–36)
MCV RBC AUTO: 89.3 FL — SIGNIFICANT CHANGE UP (ref 80–100)
NRBC # BLD: 0 /100 WBCS — SIGNIFICANT CHANGE UP (ref 0–0)
PLATELET # BLD AUTO: 201 K/UL — SIGNIFICANT CHANGE UP (ref 150–400)
POTASSIUM SERPL-MCNC: 3.7 MMOL/L — SIGNIFICANT CHANGE UP (ref 3.5–5.3)
POTASSIUM SERPL-SCNC: 3.7 MMOL/L — SIGNIFICANT CHANGE UP (ref 3.5–5.3)
PROTHROM AB SERPL-ACNC: 33.2 SEC — HIGH (ref 9.8–12.7)
RBC # BLD: 4.02 M/UL — SIGNIFICANT CHANGE UP (ref 3.8–5.2)
RBC # FLD: 13.6 % — SIGNIFICANT CHANGE UP (ref 10.3–14.5)
SODIUM SERPL-SCNC: 142 MMOL/L — SIGNIFICANT CHANGE UP (ref 135–145)
SPECIMEN SOURCE: SIGNIFICANT CHANGE UP
WBC # BLD: 5.87 K/UL — SIGNIFICANT CHANGE UP (ref 3.8–10.5)
WBC # FLD AUTO: 5.87 K/UL — SIGNIFICANT CHANGE UP (ref 3.8–10.5)

## 2018-04-30 PROCEDURE — 99233 SBSQ HOSP IP/OBS HIGH 50: CPT

## 2018-04-30 RX ORDER — ALBUTEROL 90 UG/1
1 AEROSOL, METERED ORAL
Qty: 0 | Refills: 0 | DISCHARGE
Start: 2018-04-30

## 2018-04-30 RX ORDER — WARFARIN SODIUM 2.5 MG/1
1 TABLET ORAL ONCE
Qty: 0 | Refills: 0 | Status: DISCONTINUED | OUTPATIENT
Start: 2018-04-30 | End: 2018-04-30

## 2018-04-30 RX ORDER — WARFARIN SODIUM 2.5 MG/1
1 TABLET ORAL
Qty: 0 | Refills: 0 | DISCHARGE
Start: 2018-04-30

## 2018-04-30 RX ADMIN — Medication 25 MILLIGRAM(S): at 05:18

## 2018-04-30 RX ADMIN — Medication 25 MILLIGRAM(S): at 16:58

## 2018-04-30 RX ADMIN — Medication 100 MICROGRAM(S): at 05:18

## 2018-04-30 RX ADMIN — PIPERACILLIN AND TAZOBACTAM 25 GRAM(S): 4; .5 INJECTION, POWDER, LYOPHILIZED, FOR SOLUTION INTRAVENOUS at 05:18

## 2018-04-30 RX ADMIN — Medication 1 TABLET(S): at 16:58

## 2018-04-30 RX ADMIN — ALBUTEROL 2.5 MILLIGRAM(S): 90 AEROSOL, METERED ORAL at 08:06

## 2018-04-30 RX ADMIN — Medication 81 MILLIGRAM(S): at 11:00

## 2018-04-30 RX ADMIN — ALBUTEROL 2.5 MILLIGRAM(S): 90 AEROSOL, METERED ORAL at 14:46

## 2018-04-30 NOTE — PROGRESS NOTE ADULT - ASSESSMENT
87 year old female with pmhx HTN, hypothyroidism, scarlet fever as child, h/o osteomyelitis in left elbow and right knee s/p multiple surgeries as child now presenting after having fall at home on 4/14 found to have acute CVA, hospital course c/b worsening cough/sob/malaise, CTA chest done 4/25 showing multifocal pna/hcap, she was given IV zosyn.     1. multifocal pna/HCAP/acute CVA  - improving  - on zosyn [id number] for resistant gnr/anaroebic/strep/mssa coverage #6  - continue with antibiotic coverage  - switch to oral ceftin [id number] in am to complete 3 more days  - check sputum cx, blood cx no growth  - monitor temps  - tolerating abx well so far; no side effects noted  - reason for abx use and side effects reviewed with patient  - supportive care  - f/u cbc    2. other issues - CVA, afib - care per medicine

## 2018-04-30 NOTE — PROGRESS NOTE ADULT - ASSESSMENT
A 86 yo female with new onset afib/flutter Vidal vasc #5, embolic CVA    Plan:  :coumadin 1 mg po tonight  ::Daily PT/INR/CBC/BMP  POSS TRANSFER TO REHAB TODAY.    Will continue to follow.

## 2018-04-30 NOTE — PROGRESS NOTE ADULT - PROVIDER SPECIALTY LIST ADULT
Anticoag Management
Cardiology
Hospitalist
Infectious Disease
Infectious Disease
Vascular Surgery
Anticoag Management
Cardiology
Hospitalist
Hospitalist
Anticoag Management
Anticoag Management
Hospitalist

## 2018-04-30 NOTE — PROGRESS NOTE ADULT - SUBJECTIVE AND OBJECTIVE BOX
Date of service: 04-30-18 @ 11:10    pt seen and examined  nonproductive cough  no fevers  laying in bed  feels better      ROS: no fever or chills; denies dizziness, no HA, no abdominal pain, no diarrhea or constipation; no dysuria, no urinary frequency, no legs pain, no rashes      MEDICATIONS  (STANDING):  ALBUTerol    0.083% 2.5 milliGRAM(s) Nebulizer every 6 hours  ALBUTerol    90 MICROgram(s) HFA Inhaler 1 Puff(s) Inhalation every 4 hours  aspirin enteric coated 81 milliGRAM(s) Oral daily  atorvastatin 80 milliGRAM(s) Oral at bedtime  levothyroxine 100 MICROGram(s) Oral daily  metoprolol tartrate 25 milliGRAM(s) Oral two times a day  piperacillin/tazobactam IVPB. 3.375 Gram(s) IV Intermittent every 8 hours  warfarin 1 milliGRAM(s) Oral once      Vital Signs Last 24 Hrs  T(C): 36.2 (30 Apr 2018 11:02), Max: 36.6 (29 Apr 2018 16:32)  T(F): 97.2 (30 Apr 2018 11:02), Max: 97.9 (29 Apr 2018 16:32)  HR: 88 (30 Apr 2018 11:02) (68 - 89)  BP: 103/68 (30 Apr 2018 11:02) (102/43 - 117/50)  BP(mean): --  RR: 17 (30 Apr 2018 11:02) (16 - 18)  SpO2: 98% (30 Apr 2018 11:02) (98% - 100%)        PE:  Constitutional: frail looking  HEENT: NC/AT, EOMI, PERRLA, conjunctivae clear; ears and nose atraumatic; pharynx benign  Neck: supple; thyroid not palpable  Back: no tenderness  Respiratory: decreased breath sounds, rhonchi  Cardiovascular: S1S2 regular, no murmurs  Abdomen: soft, not tender, not distended, positive BS; liver and spleen WNL  Genitourinary: no suprapubic tenderness  Lymphatic: no LN palpable  Musculoskeletal: no muscle tenderness, no joint swelling or tenderness  Extremities: no pedal edema  Neurological/ Psychiatric: AxOx3, Judgement and insight normal;  moving all extremities  Skin: bruising along extremities, no palpable lesions    Labs: all available labs reviewed                                              11.8   5.87  )-----------( 201      ( 30 Apr 2018 06:41 )             35.9     04-30    142  |  106  |  8   ----------------------------<  91  3.7   |  29  |  0.62    Ca    8.2<L>      30 Apr 2018 06:41            Cultures:    Culture - Blood (04.25.18 @ 15:39)    Specimen Source: .Blood Blood-Peripheral aerobic bottle only    Culture Results:   No growth to date.        Radiology: all available radiological tests reviewed  < from: CT Angio Chest PE Protocol w/ IV Cont (04.25.18 @ 14:31) >    EXAM:  CT ANGIO CHEST PE PROTOCOL IC                            PROCEDURE DATE:  04/25/2018          INTERPRETATION:  CT ANGIO CHEST PE PROTOCOL IC    HISTORY:  Shortness of breath, cough, atrial fibrillation    TECHNIQUE: Contrast enhanced CT pulmonary angiogram was performed.   Multiplanar CT and HRCT images were reviewed. Maximum intensity   projection (MIP) images are reconstructed as per CT angiography protocol.   Images were acquired during the administration of 90 cc Omnipaque 350 IV   contrast. This study was performed using automatic exposure control   (radiation dose reduction software) to obtain a diagnostic image quality   scan with patient dose as low as reasonably achievable.    COMPARISON: Chest x-ray one day prior    PULMONARY ARTERIES: No pulmonary embolism to the segmental branches.   Limited visualization of subsegmental branches secondary to respiratory   motion. Enlargement of the main pulmonary artery up to 3.9 cm.    LUNGS, AIRWAYS: The central airways are patent.There is patchy   consolidation in both lower lobes and left upper lobe.    PLEURA: Trace right pleural effusion.    HEART AND VESSELS: Mild cardiomegaly. No pericardial effusion. Coronary   and aortic valve calcification. Severe calcification of themitral   annulus. Aortic atherosclerosis without aneurysm.    MEDIASTINUM, DONALDO, AXILLAE: No adenopathy.    UPPER ABDOMEN: Limited visualization is unremarkable.    BONES AND CHEST WALL: No acute bony abnormality.    IMPRESSION:     No pulmonary embolism to the segmental branches. Limited visualization of   subsegmental branches secondary to respiratory motion.     Enlargement of the main pulmonary artery up to 3.9 cm. Correlate for   pulmonary arterial hypertension.    Small patchy consolidationin both lower lobes and left upper lobe,   likely infection.        Advanced directives addressed: full resuscitation

## 2018-04-30 NOTE — PROGRESS NOTE ADULT - SUBJECTIVE AND OBJECTIVE BOX
HPI: This is a 87 year old female with pmhx HTN, hypothyroidism, scarlet fever as child, h/o osteomyelitis in left elbow and right knee s/p multiple surgeries as child now presenting after having fall at home. Patient states at roughly 4:30 AM this morning as she was getting out of bed to go to bathroom, getting dizzy and falling to ground. She reports no head trauma during the event. She states she was on floor for 2 hours, and had difficulty getting up to her feet. A phone was noted nearby and she called 911. She notes during her time on the floor having noted right arm weakness  and leg weakness that was intermittent but progressively worsening. She notes having falls in the past due to "2 bum knees" and having difficulty getting up due to her joint issues. She states yesterday feeling well and reports no weakness, dizziness, palpitations, chest pain or shortness of breath. In ED, it was noted that patient had atrial fibrillation rhythm and CTA head demonstrated occlusion with cut off of the mid left M1 segment with a 1 cm thrombus and high grade stenosis of left carotid artery. Dr. Cook/Hugh Stroke physician was called by ED attending for probable neurovascular intervention but was deemed to be not a candidate by Dr. Cook due to age, lesion location and NIHSS score. She is presently laying in bed, resting comfortably, concerned regarding her arm and leg weakness but otherwise reports no dizziness, headache, vision changes. (14 Apr 2018 14:51)    Patient is a 87y old  Female who presents with a chief complaint of fall and weakness (14 Apr 2018 14:51)    Consulted by Dr. Chambers  for VTE prophylaxis, risk stratification, and anticoagulation management.    PAST MEDICAL & SURGICAL HISTORY:  Essential hypertension  Chronic multifocal osteomyelitis, other site  Scarlet fever  S/P debridement    CrCl:40.29    IMPROVE VTE Risk Score: #5    [id number] Score: #6    IMPROVE Bleeding Risk Score    Falls Risk:   High (x  )  Mod (  )  Low (  )    4/21: Patient seen at bedside, discussed current INR from 1.49 to 2.72- brisk rise. Will hold coumadin tonight. Patient verbalizes understanding- explains that she doesn't feel well today- tired- and wants to work with PT. Needs OT for hand strength and dexterity.  4/22: Patient seen at bedside- resting comfortable- did work with PT yesterday. Coumadin held yesterday- INR 2.90 this am (rising) therefore will hold again tonight.  Stable, no changes in dispo.  4-23-18 Pt seen at bedside. discussed her anticoagulation with coumadin, questions answered.  Pt concerned about going to rehab.   4-24-18 pt seen at bedside helped oob to chair was sitting n edge of bed.  Dr Malhotra present.  Discussed her anticoagulation with coumadin and starting Lovenox until inr is 2 or greater.  Pt has no concerns about anticoagulation  She states she has concerns about going to rehab and she signed a appeal.  4/25/18: Pt seen at bedside, talking on phone. Informed pt of her INR result, brisk drop and will continue with same dose.   4/26: seen at bedside, states " my numbers are up but I feel lousy"  4/27: Patient seen at bedside-INR 3.2 today.   4/29: seen at bedside,  Now has B/L PNA  4-30-18 Pt seen in bathroom on 3E.  pt states she is going to go to [facility] rehab today.  Discussed her anticoagulation with coumadin. Questions answered will reinforce as needed.     FAMILY HISTORY:  Family history of heart disease (Father, Mother, Sibling)    Denies any personal or familial history of clotting or bleeding disorders.    Allergies    No Known Allergies    Intolerances    REVIEW OF SYSTEMS    (  )Fever	     (  )Constipation	(  )SOB				(  )Headache	(  )Dysuria  (  )Chills	     (  )Melena	(  )Dyspnea present on exertion	                    (  )Dizziness                    (  )Polyuria  (  )Nausea	     (  )Hematochezia	(  )Cough			                    (  )Syncope   	(  )Hematuria  (  )Vomiting    (  )Chest Pain	(  )Wheezing			(  )Weakness  (  )Diarrhea     (  )Palpitations	(  )Anorexia			(  )Myalgia    All other review of systems negative: Yes    PHYSICAL EXAM:    Constitutional: Appears Well    Neurological: A& O x 3, STEELE  RLE <LLE    Skin: Warm    Respiratory and Thorax: normal effort; Breath sounds: normal; No rales/wheezing/rhonchi  	  Cardiovascular: S1, S2, irregular, NMBR	MP Aflutter    Gastrointestinal: BS + x 4Q, nontender	    Genitourinary:  Bladder nondistended, nontender    Musculoskeletal:   General Right:   no muscle/joint tenderness,   normal tone, no joint swelling,   ROM: limited	    General Left:   no muscle/joint tenderness,   normal tone, no joint swelling,   ROM: full    Lower extrems:   Right: no calf tenderness              negative jojo's sign               + pedal pulses    Left:   no calf tenderness              negative jojo's sign               + pedal pulses                          11.8   5.87  )-----------( 201      ( 30 Apr 2018 06:41 )             35.9       04-30    142  |  106  |  8   ----------------------------<  91  3.7   |  29  |  0.62    Ca    8.2<L>      30 Apr 2018 06:41                        12.5   6.00  )-----------( 195      ( 28 Apr 2018 06:01 )             37.3       04-28    140  |  105  |  10  ----------------------------<  75  3.3<L>   |  28  |  0.69    Ca    8.5      28 Apr 2018 06:01    PT/INR - ( 30 Apr 2018 06:41 )   PT: 33.2 sec;   INR: 3.01 ratio   PT/INR - ( 29 Apr 2018 06:50 )   PT: 27.6 sec;   INR: 2.51 ratio    PT INR    ( 28, Apr 2018 06:50)   PT: 31.8 sec;   INR: 2.88 ratio  PT/INR - ( 27 Apr 2018 06:29 )   PT: 36.2 sec;   INR: 3.27 ratio    PT/INR - ( 26 Apr 2018 09:03 )   PT: 25.2 sec;   INR: 2.29 ratio    PT/INR - ( 25 Apr 2018 06:25 )   PT: 17.0 sec;   INR: 1.56 ratio    PT/INR - ( 24 Apr 2018 05:25 )   PT: 17.8 sec;   INR: 1.63 ratio   PT/INR - ( 23 Apr 2018 06:40 )   PT: 21.4 sec;   INR: 1.95 ratio  PT/INR - ( 22 Apr 2018 06:01 )   PT: 32.0 sec;   INR: 2.90 ratio    PT/INR - ( 21 Apr 2018 05:51 )   PT: 30.0 sec;   INR: 2.72 ratio    PT/INR - ( 20 Apr 2018 04:06 )   PT: 16.2 sec;   INR: 1.49 ratio      MEDICATIONS  (STANDING):  ALBUTerol    0.083% 2.5 milliGRAM(s) Nebulizer every 6 hours  ALBUTerol    90 MICROgram(s) HFA Inhaler 1 Puff(s) Inhalation every 4 hours  amoxicillin  500 milliGRAM(s)/clavulanate 1 Tablet(s) Oral every 12 hours  aspirin enteric coated 81 milliGRAM(s) Oral daily  atorvastatin 80 milliGRAM(s) Oral at bedtime  levothyroxine 100 MICROGram(s) Oral daily  metoprolol tartrate 25 milliGRAM(s) Oral two times a day  warfarin 1 milliGRAM(s) Oral once    IMAGING:  EXAM:  CT BRAIN:  04/20/2018    IMPRESSION: mild periventricular white matter ischemia. Small subacute infarction in the LEFT basal ganglia is noted with suggestion of faint internal hyperdensity which may reflect a punctate focus of hemorrhage.       IMPRESSION: CT ANGIO OF CHEST PE PROTOCOL W/ IV CONTRAST 4-25-18    No pulmonary embolism to the segmental branches. Limited visualization of   subsegmental branches secondary to respiratory motion.     Enlargement of the main pulmonary artery up to 3.9 cm. Correlate for   pulmonary arterial hypertension.    Small patchy consolidation in both lower lobes and left upper lobe,   likely infection.      DVT Prophylaxis:  LMWH                   (  )  Heparin SQ           (  )  Coumadin             ( x )  Xarelto                  (  )  Eliquis                   (  )  Venodynes           (x  )  Ambulation          (x  )  UFH                       (  )  Contraindicated  (  )

## 2018-05-04 DIAGNOSIS — I65.22 OCCLUSION AND STENOSIS OF LEFT CAROTID ARTERY: ICD-10-CM

## 2018-05-04 DIAGNOSIS — I10 ESSENTIAL (PRIMARY) HYPERTENSION: ICD-10-CM

## 2018-05-04 DIAGNOSIS — R79.1 ABNORMAL COAGULATION PROFILE: ICD-10-CM

## 2018-05-04 DIAGNOSIS — I48.91 UNSPECIFIED ATRIAL FIBRILLATION: ICD-10-CM

## 2018-05-04 DIAGNOSIS — R29.706 NIHSS SCORE 6: ICD-10-CM

## 2018-05-04 DIAGNOSIS — I08.0 RHEUMATIC DISORDERS OF BOTH MITRAL AND AORTIC VALVES: ICD-10-CM

## 2018-05-04 DIAGNOSIS — T14.8XXA OTHER INJURY OF UNSPECIFIED BODY REGION, INITIAL ENCOUNTER: ICD-10-CM
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
## 2018-05-04 DIAGNOSIS — J18.9 PNEUMONIA, UNSPECIFIED ORGANISM: ICD-10-CM

## 2018-05-04 DIAGNOSIS — I48.92 UNSPECIFIED ATRIAL FLUTTER: ICD-10-CM

## 2018-05-04 DIAGNOSIS — G81.90 HEMIPLEGIA, UNSPECIFIED AFFECTING UNSPECIFIED SIDE: ICD-10-CM

## 2018-05-04 DIAGNOSIS — E03.9 HYPOTHYROIDISM, UNSPECIFIED: ICD-10-CM

## 2018-05-04 DIAGNOSIS — G81.91 HEMIPLEGIA, UNSPECIFIED AFFECTING RIGHT DOMINANT SIDE: ICD-10-CM

## 2018-05-04 DIAGNOSIS — I63.312 CEREBRAL INFARCTION DUE TO THROMBOSIS OF LEFT MIDDLE CEREBRAL ARTERY: ICD-10-CM

## 2018-05-04 DIAGNOSIS — M25.531 PAIN IN RIGHT WRIST: ICD-10-CM

## 2018-05-04 DIAGNOSIS — E44.0 MODERATE PROTEIN-CALORIE MALNUTRITION: ICD-10-CM
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
## 2018-05-04 DIAGNOSIS — R29.810 FACIAL WEAKNESS: ICD-10-CM

## 2018-06-15 NOTE — DIETITIAN INITIAL EVALUATION ADULT. - +GENDER
Discharge Instructions after Colonoscopy        Today you had a  Colonoscopy       Activity and Diet   You were given medicine for pain. You may be dizzy or sleepy.   For 24 hours:     Do not drive or use heavy equipment.     Do not make important decisions.     Do not drink any alcohol.   You may return to your normal diet and medicines.       Discomfort     Air was placed in your colon during the exam in order to see it. Walking helps to pass the air.     You may take Tylenol (acetaminophen) for pain unless your doctor has told you not to.   Do not take aspirin or ibuprofen (Advil, Motrin, or other anti-inflammatory   drugs) for _____ days.       Follow-up   ____ We took small tissue samples or polyps to study. Your doctor will call you with the results within two weeks.       When to call:       Call right away if you have:     Unusual pain in belly or chest pain not relieved with passing air.     More than 1 to 2 Tablespoons of bleeding from your rectum.     Fever above 100.6  F (37.5  C).       If you have severe pain, bleeding, or shortness of breath, go to an emergency room.       If you have questions, call:   Monday to Friday, 7 a.m. to 4:30 p.m.   Endoscopy: [phone] (We may have to call you back)       After hours   Hospital: [phone] (Ask for the GI fellow on call)    Statement Selected

## 2019-03-21 NOTE — PROGRESS NOTE ADULT - SUBJECTIVE AND OBJECTIVE BOX
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 Patient states at roughly 4:30 AM on day of admission as she was getting out of bed to go to bathroom, getting dizzy and falling to ground. She reports no head trauma during the event. She states she was on floor for 2 hours, and had difficulty getting up to her feet.   A phone was noted nearby and she called 911. She notes during her time on the floor having noted right arm weakness  and leg weakness that was intermittent but progressively worsening. She notes having falls in the past due to "2 bum knees" and having difficulty getting up due to her joint issues. In ED, it was noted that patient had atrial fibrillation rhythm and CTA head demonstrated occlusion with cut off of the mid left M1 segment with a 1 cm thrombus and high grade stenosis of left carotid artery. Dr. Cook/Hugh Stroke physician was called by ED attending for probable neurovascular intervention but was deemed to be not a candidate by Dr. Cook due to age, lesion location and NIHSS score. She is presently laying in bed, resting comfortably, concerned regarding her arm and leg weakness but otherwise reports no dizziness, headache, vision changes. (14 Apr 2018 14:51)      #Review of system- rest of review of systems are negative except as mentioned in HPI    PHYSICAL EXAM:    Vital Signs Last 24 Hrs  T(C): 36.2 (17 Apr 2018 10:55), Max: 36.5 (17 Apr 2018 04:43)  T(F): 97.1 (17 Apr 2018 10:55), Max: 97.7 (17 Apr 2018 04:43)  HR: 82 (17 Apr 2018 10:55) (74 - 96)  BP: 119/43 (17 Apr 2018 10:55) (119/43 - 156/62)  BP(mean): --  RR: 18 (17 Apr 2018 10:55) (17 - 18)  SpO2: 97% (17 Apr 2018 10:55) (97% - 99%)    GENERAL: comfortable   HEAD:  Atraumatic, Normocephalic  EYES: EOMI, PERRLA, conjunctiva and sclera clear  HEENT: Moist mucous membranes  NECK: Supple, No JVD  NERVOUS SYSTEM:  Alert & Oriented X3, weakness noted on right side   CHEST/LUNG: Clear to auscultation bilaterally; No rales, rhonchi, wheezing, or rubs  HEART:S1S2 normal  ABDOMEN: Soft, Nontender, Nondistended; Bowel sounds present  GENITOURINARY- Voiding, no palpable bladder  EXTREMITIES:  2+ Peripheral Pulses, No clubbing, cyanosis, or edema  MUSCULOSKELETAL No muscle tenderness, Muscle tone normal,  SKIN-no rash, no lesion  PSYCH- Mood stable  LYMPH Node- No palpable lymph node                          12.7   11.07 )-----------( 443      ( 17 Apr 2018 05:31 )             38.5               Culture - Blood (collected 15 Apr 2018 12:45)  Source: .Blood None  Preliminary Report (16 Apr 2018 21:01):    No growth to date.      PT/INR - ( 17 Apr 2018 05:31 )   PT: 12.1 sec;   INR: 1.12 ratio         PTT - ( 17 Apr 2018 10:31 )  PTT:100.3 sec      PT/INR - ( 17 Apr 2018 05:31 )   PT: 12.1 sec;   INR: 1.12 ratio         PTT - ( 17 Apr 2018 10:31 )  PTT:100.3 sec  CAPILLARY BLOOD GLUCOSE          Culture - Blood (collected 15 Apr 2018 12:45)  Source: .Blood None  Preliminary Report (16 Apr 2018 21:01):    No growth to date.      MEDICATIONS  (STANDING):  aspirin enteric coated 81 milliGRAM(s) Oral daily  atorvastatin 80 milliGRAM(s) Oral at bedtime  heparin  Infusion.  Unit(s)/Hr (9 mL/Hr) IV Continuous <Continuous>  levothyroxine 100 MICROGram(s) Oral daily  metoprolol tartrate 25 milliGRAM(s) Oral two times a day  warfarin 7.5 milliGRAM(s) Oral daily    MEDICATIONS  (PRN):  heparin  Injectable 4000 Unit(s) IV Push every 6 hours PRN For aPTT less than 40  heparin  Injectable 2000 Unit(s) IV Push every 6 hours PRN For aPTT between 40 - 57 111

## 2019-07-08 NOTE — ED ADULT TRIAGE NOTE - NS ED NURSE DIRECT TO ROOM YN
Yes F:   E: replete PRN  N: Mechanical soft w/ thin liquids Patient with hx of normocytic anemia (Fe studies on last admission with YOHANNES, also found to have low B12 s/p Vit B12 injection), also has a hx of bleeding vessel s/p ligation  - Hgb on June 28: 7.7, now presenting with Hgb 7.5. No s/s of bleeding  - f/u CBC  - maintain active T&S  - transfuse for Hgb < 7 Patient with hx of normocytic anemia (Fe studies on last admission with YOHANNES, also found to have low B12 s/p Vit B12 injection), also has a hx of bleeding vessel s/p ligation at [facility] (exact history unclear)  - Hgb on June 28: 7.7, now presenting with Hgb 7.5. No s/s of bleeding  - f/u CBC  - maintain active T&S  - transfuse for Hgb < 7 Not on treatment, hasn't seen [facility]. She refused psych eval on last admission in June 2019  - pt to f/u with psych as outpatient    #Schizoaffective disorder  - management as above  - no acute intervention

## 2019-12-27 NOTE — ED ADULT NURSE NOTE - TEMPLATE
The patient needs an appointment for a face to face visit for documentation of chronic narcotic use.  I cannot refill the requested narcotic at this time.   Neuro

## 2020-03-29 NOTE — PROGRESS NOTE ADULT - SUBJECTIVE AND OBJECTIVE BOX
Patient is a 87y old  Female who presents with a chief complaint of fall and weakness (14 Apr 2018 14:51)    She is 88 Yo Female brought to ER with woke up this morning with Rt side arm and leg weakness went to bed last night at 10 pm. Could not move Rt arm or leg well with difficulty with her speech noted in ED. Not a TPA candidate but stat CT/ cTA performed noted Thrombus in left   MCA and ED physician on called  [facility] stroke team spoke with Dr. Cook and decided not a candidate for Thrombectomy and rec admission at  and further rx. Pt denies of any prior neurological sx and headaches, chest pain, SOB or palpitations. H/o Hypothyroidism takes medication. Lives home alone. Being followed by Dr. Blackburn but doesn't recall when she was seen last.     Examined at bed side, feels better, denies angina, orthopnea, PND, leg swelling, palpitations, syncope. Has no h/o cardiac arrhythmia or CAD.    4/15 - no new complaints. Tele reviewed, AF rate controlled. Agree with BB for rate control. Stable from CV stand point.   Cont current regimen. Start AC when safe from neuro standpoint. Pending vascular surgery consult re: SID. Agree with high intensity statin and ASA. ECHO pending.     4/16-  having intermittent weak sensation in the right hand.   tolerating coumadin. Not on heparin and not therapeutic with the coumadin.   remains in a rate controlled Atrial Fibrillation.  Usually followed for cardiology by Dr Bill at [facility].     4/17-  on IV heparin and loading with coumadin.   rate controlled atrial fibrillation.   spoke to Dr Sanchez. CEA not indicated at this time.     4/18-  not therapeutic on coumadin yet.   tolerating heparin. still with right sided weakness.   decreased appetite and some stomach discomfort. might be constipated. difficult for her to use bedpan and commode is too high for her.       Allergies    No Known Allergies    Intolerances        MEDICATIONS  (STANDING):  aspirin enteric coated 81 milliGRAM(s) Oral daily  atorvastatin 80 milliGRAM(s) Oral at bedtime  heparin  Infusion.  Unit(s)/Hr (9 mL/Hr) IV Continuous <Continuous>  levothyroxine 100 MICROGram(s) Oral daily  metoprolol tartrate 25 milliGRAM(s) Oral two times a day  warfarin 10 milliGRAM(s) Oral daily    MEDICATIONS  (PRN):  heparin  Injectable 4000 Unit(s) IV Push every 6 hours PRN For aPTT less than 40  heparin  Injectable 2000 Unit(s) IV Push every 6 hours PRN For aPTT between 40 - 57    REVIEW OF SYSTEMS:    RESPIRATORY: No cough, wheezing, hemoptysis; No shortness of breath  CARDIOVASCULAR: No chest pain or palpitations  All other review of systems is negative unless indicated above      PHYSICAL EXAM:  Daily     Daily   Vital Signs Last 24 Hrs  T(C): 36.4 (18 Apr 2018 04:53), Max: 37.1 (17 Apr 2018 16:38)  T(F): 97.5 (18 Apr 2018 04:53), Max: 98.7 (17 Apr 2018 16:38)  HR: 88 (18 Apr 2018 04:53) (82 - 90)  BP: 97/52 (18 Apr 2018 04:53) (97/52 - 130/52)  BP(mean): --  RR: 19 (18 Apr 2018 04:53) (18 - 19)  SpO2: 98% (18 Apr 2018 04:53) (96% - 98%)    Constitutional: NAD, awake and alert, well-developed  HEENT: PERR, EOMI, Normal Hearing, MMM  Neck: Soft and supple, No LAD, No JVD  Respiratory: Breath sounds are clear bilaterally, No wheezing, rales or rhonchi  Cardiovascular: S1 and S2, regular rate and rhythm, no Murmurs, gallops or rubs  Gastrointestinal: Bowel Sounds present, soft, nontender, nondistended, no guarding, no rebound  Extremities: No peripheral edema  Vascular: 2+ peripheral pulses  Neurological: A/O x 3, no focal deficits  Musculoskeletal: 5/5 strength b/l upper and lower extremities  Skin: No rashes    LABS: All Labs Reviewed:                        12.3   14.16 )-----------( 415      ( 18 Apr 2018 05:43 )             36.1           PT/INR - ( 18 Apr 2018 05:43 )   PT: 12.7 sec;   INR: 1.17 ratio         PTT - ( 18 Apr 2018 00:12 )  PTT:73.2 sec          TELEMETRY/EKG: rate controlled afib/flutter physical therapy  dvt prophylaxis as per ortho physical therapy  dvt prophylaxis as per ortho

## 2020-08-12 ENCOUNTER — INPATIENT (INPATIENT)
Facility: HOSPITAL | Age: 85
LOS: 2 days | Discharge: HOME HEALTH | DRG: 292 | End: 2020-08-15
Attending: FAMILY MEDICINE | Admitting: FAMILY MEDICINE
Payer: MEDICARE

## 2020-08-12 VITALS
HEIGHT: 60 IN | WEIGHT: 80.03 LBS | RESPIRATION RATE: 20 BRPM | OXYGEN SATURATION: 99 % | DIASTOLIC BLOOD PRESSURE: 58 MMHG | HEART RATE: 87 BPM | TEMPERATURE: 98 F | SYSTOLIC BLOOD PRESSURE: 125 MMHG

## 2020-08-12 DIAGNOSIS — Z98.890 OTHER SPECIFIED POSTPROCEDURAL STATES: Chronic | ICD-10-CM

## 2020-08-12 LAB
ALBUMIN SERPL ELPH-MCNC: 3.2 G/DL — LOW (ref 3.3–5)
ALP SERPL-CCNC: 76 U/L — SIGNIFICANT CHANGE UP (ref 40–120)
ALT FLD-CCNC: 24 U/L — SIGNIFICANT CHANGE UP (ref 12–78)
ANION GAP SERPL CALC-SCNC: 4 MMOL/L — LOW (ref 5–17)
APTT BLD: 31.2 SEC — SIGNIFICANT CHANGE UP (ref 27.5–35.5)
AST SERPL-CCNC: 25 U/L — SIGNIFICANT CHANGE UP (ref 15–37)
BASOPHILS # BLD AUTO: 0.04 K/UL — SIGNIFICANT CHANGE UP (ref 0–0.2)
BASOPHILS NFR BLD AUTO: 0.6 % — SIGNIFICANT CHANGE UP (ref 0–2)
BILIRUB SERPL-MCNC: 0.8 MG/DL — SIGNIFICANT CHANGE UP (ref 0.2–1.2)
BUN SERPL-MCNC: 27 MG/DL — HIGH (ref 7–23)
CALCIUM SERPL-MCNC: 8.9 MG/DL — SIGNIFICANT CHANGE UP (ref 8.5–10.1)
CHLORIDE SERPL-SCNC: 103 MMOL/L — SIGNIFICANT CHANGE UP (ref 96–108)
CO2 SERPL-SCNC: 33 MMOL/L — HIGH (ref 22–31)
CREAT SERPL-MCNC: 1.14 MG/DL — SIGNIFICANT CHANGE UP (ref 0.5–1.3)
EOSINOPHIL # BLD AUTO: 0.11 K/UL — SIGNIFICANT CHANGE UP (ref 0–0.5)
EOSINOPHIL NFR BLD AUTO: 1.6 % — SIGNIFICANT CHANGE UP (ref 0–6)
GLUCOSE SERPL-MCNC: 111 MG/DL — HIGH (ref 70–99)
HCT VFR BLD CALC: 42.7 % — SIGNIFICANT CHANGE UP (ref 34.5–45)
HGB BLD-MCNC: 13.8 G/DL — SIGNIFICANT CHANGE UP (ref 11.5–15.5)
IMM GRANULOCYTES NFR BLD AUTO: 0.1 % — SIGNIFICANT CHANGE UP (ref 0–1.5)
INR BLD: 1.23 RATIO — HIGH (ref 0.88–1.16)
LYMPHOCYTES # BLD AUTO: 1.27 K/UL — SIGNIFICANT CHANGE UP (ref 1–3.3)
LYMPHOCYTES # BLD AUTO: 18 % — SIGNIFICANT CHANGE UP (ref 13–44)
MCHC RBC-ENTMCNC: 31.7 PG — SIGNIFICANT CHANGE UP (ref 27–34)
MCHC RBC-ENTMCNC: 32.3 GM/DL — SIGNIFICANT CHANGE UP (ref 32–36)
MCV RBC AUTO: 98.2 FL — SIGNIFICANT CHANGE UP (ref 80–100)
MONOCYTES # BLD AUTO: 0.59 K/UL — SIGNIFICANT CHANGE UP (ref 0–0.9)
MONOCYTES NFR BLD AUTO: 8.4 % — SIGNIFICANT CHANGE UP (ref 2–14)
NEUTROPHILS # BLD AUTO: 5.03 K/UL — SIGNIFICANT CHANGE UP (ref 1.8–7.4)
NEUTROPHILS NFR BLD AUTO: 71.3 % — SIGNIFICANT CHANGE UP (ref 43–77)
NT-PROBNP SERPL-SCNC: 8243 PG/ML — HIGH (ref 0–450)
PLATELET # BLD AUTO: 236 K/UL — SIGNIFICANT CHANGE UP (ref 150–400)
POTASSIUM SERPL-MCNC: 5 MMOL/L — SIGNIFICANT CHANGE UP (ref 3.5–5.3)
POTASSIUM SERPL-SCNC: 5 MMOL/L — SIGNIFICANT CHANGE UP (ref 3.5–5.3)
PROT SERPL-MCNC: 6.7 GM/DL — SIGNIFICANT CHANGE UP (ref 6–8.3)
PROTHROM AB SERPL-ACNC: 14.3 SEC — HIGH (ref 10.6–13.6)
RBC # BLD: 4.35 M/UL — SIGNIFICANT CHANGE UP (ref 3.8–5.2)
RBC # FLD: 14.1 % — SIGNIFICANT CHANGE UP (ref 10.3–14.5)
SODIUM SERPL-SCNC: 140 MMOL/L — SIGNIFICANT CHANGE UP (ref 135–145)
TROPONIN I SERPL-MCNC: <0.015 NG/ML — SIGNIFICANT CHANGE UP (ref 0.01–0.04)
WBC # BLD: 7.05 K/UL — SIGNIFICANT CHANGE UP (ref 3.8–10.5)
WBC # FLD AUTO: 7.05 K/UL — SIGNIFICANT CHANGE UP (ref 3.8–10.5)

## 2020-08-12 PROCEDURE — 71045 X-RAY EXAM CHEST 1 VIEW: CPT | Mod: 26

## 2020-08-12 RX ORDER — FUROSEMIDE 40 MG
20 TABLET ORAL ONCE
Refills: 0 | Status: COMPLETED | OUTPATIENT
Start: 2020-08-12 | End: 2020-08-12

## 2020-08-12 RX ADMIN — Medication 20 MILLIGRAM(S): at 23:05

## 2020-08-12 NOTE — ED PROVIDER NOTE - SKIN, MLM
Skin normal color for race, warm, dry and intact. No evidence of rash. Skin normal color for race, warm, dry and intact. No evidence of rash. +1cm nodule R anterior chest wall.

## 2020-08-12 NOTE — ED ADULT NURSE NOTE - OBJECTIVE STATEMENT
Pt presents to ER c/o SOB r/t losing power today and her oxygen machine not being able to function properly. Pt reports calling power company for complaint and they escalated care to emergency services. Denies CP. AO x 3 oriented to baseline, normal breathing pattern with no difficulty.

## 2020-08-12 NOTE — ED PROVIDER NOTE - PROGRESS NOTE DETAILS
Spoke with patient at length. Explained that cxr and labs show pt is in chf and recommend admission. Pt states she is hesitant to stay due to covid and wants private duty nursing and a private room. I explained that she or her family (son ) wound have to make arrangements for private duty nursing on her own. I would ask if a private room were available if she were willing to stay. Pt very argumentative, stating that I would be the  one to arrange private duty nursing and that she knows how it works at this hospital. Pt then stated she wanted to be transferred to [facility] where her doctor Fly practices. I explained that her condition could be treated here but I would give her a phone so she could contact Dr. Bill personally to accept her as a trransfer patient. I explained that I neede to know if she was in agreement to be admitted. I recommended that  she speak to nursing supervision to see what options were available. Pt is extremely belligerent and argumentative. Supervisor Fingerhut paged and will speak to patient. I am conceerned that patient lives alone and her electricity was not working. Her aide will be back in am but I do not think it is a safe discharge for this patient who states she is sob. Pts son is apparently disabled and lives in  [place]. Keagan RODAS

## 2020-08-12 NOTE — ED ADULT NURSE NOTE - NSIMPLEMENTINTERV_GEN_ALL_ED
Implemented All Fall Risk Interventions:  [place] to call system. Call bell, personal items and telephone within reach. Instruct patient to call for assistance. Room bathroom lighting operational. Non-slip footwear when patient is off stretcher. Physically safe environment: no spills, clutter or unnecessary equipment. Stretcher in lowest position, wheels locked, appropriate side rails in place. Provide visual cue, wrist band, yellow gown, etc. Monitor gait and stability. Monitor for mental status changes and reorient to person, place, and time. Review medications for side effects contributing to fall risk. Reinforce activity limits and safety measures with patient and family.

## 2020-08-12 NOTE — ED PROVIDER NOTE - CONSTITUTIONAL, MLM
normal... Well appearing, awake, alert, oriented to person, place, time/situation and in no apparent distress. Well appearing, awake, alert, oriented to person, place, time/situation and in no apparent distress. Very talkative.

## 2020-08-12 NOTE — ED ADULT TRIAGE NOTE - CHIEF COMPLAINT QUOTE
pt presents to the ED complaining of SOB, per pt she has not had power for "multiple days" and is on 2-3 liters nasal canula, pt states she ran out of oxygen, pt placed on oxygen at triage

## 2020-08-12 NOTE — ED PROVIDER NOTE - PMH
Chronic multifocal osteomyelitis, other site    Essential hypertension    Scarlet fever Afib    Chronic multifocal osteomyelitis, other site    CVA (cerebral vascular accident)    Essential hypertension    Scarlet fever

## 2020-08-12 NOTE — ED PROVIDER NOTE - CARDIAC, MLM
Normal rate, regular rhythm.  Heart sounds S1, S2.  No murmurs, rubs or gallops. irregularly irregular.  Heart sounds S1, S2.  3/6 systolic murmur across the whole precardium.

## 2020-08-12 NOTE — ED PROVIDER NOTE - OBJECTIVE STATEMENT
89 y/o female with pmhx of HTN, scarlet fever presents to the ED c/o SOB. Pt has not had power for a couple of days. On 2-3 liters of O2 nasal canula and ran out of oxygen. 89 y/o female with pmhx of HTN, scarlet fever, CVA, Afib, leaky valves presents to the ED c/o SOB. Pt states shes been out of breath since her stroke in 2018. Pt states that she does not qualify oxygen at home. Pt states that today the electricity in her house went out twice and the second time she was out of breath and thinks that its anxiety. pt does not use oxygen at home but her air conditioning went out. Pt lives alone. Son lives in [place]. Her aid had left for the day. 88 y/o female with pmhx of HTN, scarlet fever, CVA, Afib, leaky valves presents to the ED c/o SOB. Pt states shes been out of breath since her stroke in 2018. Pt states that she does not qualify oxygen at home. Pt states that today the electricity in her house went out twice and the second time she was out of breath and thinks that its anxiety. pt does not use oxygen at home but her air conditioning went out. Pt lives alone. Son lives in [place]. Her aid had left for the day. Nonsmoker nondrinker no drugs.

## 2020-08-13 DIAGNOSIS — I48.21 PERMANENT ATRIAL FIBRILLATION: ICD-10-CM

## 2020-08-13 DIAGNOSIS — R06.02 SHORTNESS OF BREATH: ICD-10-CM

## 2020-08-13 DIAGNOSIS — I50.33 ACUTE ON CHRONIC DIASTOLIC (CONGESTIVE) HEART FAILURE: ICD-10-CM

## 2020-08-13 DIAGNOSIS — I50.9 HEART FAILURE, UNSPECIFIED: ICD-10-CM

## 2020-08-13 DIAGNOSIS — I38 ENDOCARDITIS, VALVE UNSPECIFIED: ICD-10-CM

## 2020-08-13 PROBLEM — I10 ESSENTIAL (PRIMARY) HYPERTENSION: Chronic | Status: ACTIVE | Noted: 2018-04-14

## 2020-08-13 PROBLEM — A38.9 SCARLET FEVER, UNCOMPLICATED: Chronic | Status: ACTIVE | Noted: 2018-04-14

## 2020-08-13 PROBLEM — M86.38 CHRONIC MULTIFOCAL OSTEOMYELITIS, OTHER SITE: Chronic | Status: ACTIVE | Noted: 2018-04-14

## 2020-08-13 LAB
ANION GAP SERPL CALC-SCNC: 1 MMOL/L — LOW (ref 5–17)
BASOPHILS # BLD AUTO: 0.07 K/UL — SIGNIFICANT CHANGE UP (ref 0–0.2)
BASOPHILS NFR BLD AUTO: 0.9 % — SIGNIFICANT CHANGE UP (ref 0–2)
BUN SERPL-MCNC: 23 MG/DL — SIGNIFICANT CHANGE UP (ref 7–23)
CALCIUM SERPL-MCNC: 8.8 MG/DL — SIGNIFICANT CHANGE UP (ref 8.5–10.1)
CHLORIDE SERPL-SCNC: 103 MMOL/L — SIGNIFICANT CHANGE UP (ref 96–108)
CO2 SERPL-SCNC: 37 MMOL/L — HIGH (ref 22–31)
CREAT SERPL-MCNC: 1 MG/DL — SIGNIFICANT CHANGE UP (ref 0.5–1.3)
EOSINOPHIL # BLD AUTO: 0.19 K/UL — SIGNIFICANT CHANGE UP (ref 0–0.5)
EOSINOPHIL NFR BLD AUTO: 2.5 % — SIGNIFICANT CHANGE UP (ref 0–6)
GLUCOSE SERPL-MCNC: 90 MG/DL — SIGNIFICANT CHANGE UP (ref 70–99)
HCT VFR BLD CALC: 37.1 % — SIGNIFICANT CHANGE UP (ref 34.5–45)
HGB BLD-MCNC: 11.6 G/DL — SIGNIFICANT CHANGE UP (ref 11.5–15.5)
IMM GRANULOCYTES NFR BLD AUTO: 0.3 % — SIGNIFICANT CHANGE UP (ref 0–1.5)
LYMPHOCYTES # BLD AUTO: 2.03 K/UL — SIGNIFICANT CHANGE UP (ref 1–3.3)
LYMPHOCYTES # BLD AUTO: 27.1 % — SIGNIFICANT CHANGE UP (ref 13–44)
MAGNESIUM SERPL-MCNC: 2.4 MG/DL — SIGNIFICANT CHANGE UP (ref 1.6–2.6)
MCHC RBC-ENTMCNC: 31.1 PG — SIGNIFICANT CHANGE UP (ref 27–34)
MCHC RBC-ENTMCNC: 31.3 GM/DL — LOW (ref 32–36)
MCV RBC AUTO: 99.5 FL — SIGNIFICANT CHANGE UP (ref 80–100)
MONOCYTES # BLD AUTO: 0.73 K/UL — SIGNIFICANT CHANGE UP (ref 0–0.9)
MONOCYTES NFR BLD AUTO: 9.7 % — SIGNIFICANT CHANGE UP (ref 2–14)
NEUTROPHILS # BLD AUTO: 4.45 K/UL — SIGNIFICANT CHANGE UP (ref 1.8–7.4)
NEUTROPHILS NFR BLD AUTO: 59.5 % — SIGNIFICANT CHANGE UP (ref 43–77)
PLATELET # BLD AUTO: 192 K/UL — SIGNIFICANT CHANGE UP (ref 150–400)
POTASSIUM SERPL-MCNC: 4.7 MMOL/L — SIGNIFICANT CHANGE UP (ref 3.5–5.3)
POTASSIUM SERPL-SCNC: 4.7 MMOL/L — SIGNIFICANT CHANGE UP (ref 3.5–5.3)
RBC # BLD: 3.73 M/UL — LOW (ref 3.8–5.2)
RBC # FLD: 14 % — SIGNIFICANT CHANGE UP (ref 10.3–14.5)
SARS-COV-2 RNA SPEC QL NAA+PROBE: SIGNIFICANT CHANGE UP
SODIUM SERPL-SCNC: 141 MMOL/L — SIGNIFICANT CHANGE UP (ref 135–145)
WBC # BLD: 7.49 K/UL — SIGNIFICANT CHANGE UP (ref 3.8–10.5)
WBC # FLD AUTO: 7.49 K/UL — SIGNIFICANT CHANGE UP (ref 3.8–10.5)

## 2020-08-13 PROCEDURE — 99223 1ST HOSP IP/OBS HIGH 75: CPT

## 2020-08-13 PROCEDURE — 99233 SBSQ HOSP IP/OBS HIGH 50: CPT | Mod: GC

## 2020-08-13 PROCEDURE — 83735 ASSAY OF MAGNESIUM: CPT

## 2020-08-13 PROCEDURE — 36415 COLL VENOUS BLD VENIPUNCTURE: CPT

## 2020-08-13 PROCEDURE — 85025 COMPLETE CBC W/AUTO DIFF WBC: CPT

## 2020-08-13 PROCEDURE — 93010 ELECTROCARDIOGRAM REPORT: CPT

## 2020-08-13 PROCEDURE — 99223 1ST HOSP IP/OBS HIGH 75: CPT | Mod: 25

## 2020-08-13 PROCEDURE — 93306 TTE W/DOPPLER COMPLETE: CPT | Mod: 26

## 2020-08-13 PROCEDURE — 99497 ADVNCD CARE PLAN 30 MIN: CPT

## 2020-08-13 PROCEDURE — 80048 BASIC METABOLIC PNL TOTAL CA: CPT

## 2020-08-13 PROCEDURE — 80053 COMPREHEN METABOLIC PANEL: CPT

## 2020-08-13 PROCEDURE — 93306 TTE W/DOPPLER COMPLETE: CPT

## 2020-08-13 PROCEDURE — 97116 GAIT TRAINING THERAPY: CPT | Mod: GP

## 2020-08-13 PROCEDURE — 86769 SARS-COV-2 COVID-19 ANTIBODY: CPT

## 2020-08-13 PROCEDURE — 97161 PT EVAL LOW COMPLEX 20 MIN: CPT | Mod: GP

## 2020-08-13 PROCEDURE — 93005 ELECTROCARDIOGRAM TRACING: CPT

## 2020-08-13 PROCEDURE — 12345: CPT | Mod: NC

## 2020-08-13 RX ORDER — LEVOTHYROXINE SODIUM 125 MCG
1 TABLET ORAL
Qty: 0 | Refills: 0 | DISCHARGE

## 2020-08-13 RX ORDER — LEVOTHYROXINE SODIUM 125 MCG
112 TABLET ORAL DAILY
Refills: 0 | Status: DISCONTINUED | OUTPATIENT
Start: 2020-08-13 | End: 2020-08-15

## 2020-08-13 RX ORDER — APIXABAN 2.5 MG/1
1 TABLET, FILM COATED ORAL
Qty: 0 | Refills: 0 | DISCHARGE

## 2020-08-13 RX ORDER — POTASSIUM CHLORIDE 20 MEQ
20 PACKET (EA) ORAL
Refills: 0 | Status: DISCONTINUED | OUTPATIENT
Start: 2020-08-13 | End: 2020-08-15

## 2020-08-13 RX ORDER — APIXABAN 2.5 MG/1
2.5 TABLET, FILM COATED ORAL EVERY 12 HOURS
Refills: 0 | Status: DISCONTINUED | OUTPATIENT
Start: 2020-08-13 | End: 2020-08-15

## 2020-08-13 RX ORDER — ENOXAPARIN SODIUM 100 MG/ML
30 INJECTION SUBCUTANEOUS ONCE
Refills: 0 | Status: DISCONTINUED | OUTPATIENT
Start: 2020-08-13 | End: 2020-08-13

## 2020-08-13 RX ORDER — FUROSEMIDE 40 MG
40 TABLET ORAL
Refills: 0 | Status: DISCONTINUED | OUTPATIENT
Start: 2020-08-13 | End: 2020-08-15

## 2020-08-13 RX ORDER — ASPIRIN/CALCIUM CARB/MAGNESIUM 324 MG
81 TABLET ORAL ONCE
Refills: 0 | Status: COMPLETED | OUTPATIENT
Start: 2020-08-13 | End: 2020-08-13

## 2020-08-13 RX ORDER — METOPROLOL TARTRATE 50 MG
25 TABLET ORAL
Refills: 0 | Status: DISCONTINUED | OUTPATIENT
Start: 2020-08-13 | End: 2020-08-15

## 2020-08-13 RX ADMIN — Medication 25 MILLIGRAM(S): at 22:42

## 2020-08-13 RX ADMIN — APIXABAN 2.5 MILLIGRAM(S): 2.5 TABLET, FILM COATED ORAL at 22:41

## 2020-08-13 RX ADMIN — Medication 40 MILLIGRAM(S): at 18:58

## 2020-08-13 RX ADMIN — Medication 25 MILLIGRAM(S): at 10:28

## 2020-08-13 RX ADMIN — Medication 112 MICROGRAM(S): at 05:20

## 2020-08-13 RX ADMIN — Medication 20 MILLIEQUIVALENT(S): at 22:41

## 2020-08-13 RX ADMIN — Medication 40 MILLIGRAM(S): at 10:28

## 2020-08-13 RX ADMIN — Medication 20 MILLIEQUIVALENT(S): at 10:27

## 2020-08-13 RX ADMIN — Medication 81 MILLIGRAM(S): at 10:27

## 2020-08-13 NOTE — PHYSICAL THERAPY INITIAL EVALUATION ADULT - GENERAL OBSERVATIONS, REHAB EVAL
pt rec'd supine in bed on 3E, O2, HM. pt w/ mult complaints re: the HM, the fact that EMS brought pt to hosp, urine incont. due to lasix. Pt very particular w/ PT having to reposition tray table/CB/HM and adjust adult diaper mult times.

## 2020-08-13 NOTE — H&P ADULT - NSHPPHYSICALEXAM_GEN_ALL_CORE
Vital Signs Last 24 Hrs  T(C): 36.8 (13 Aug 2020 01:00), Max: 36.9 (12 Aug 2020 20:27)  T(F): 98.2 (13 Aug 2020 01:00), Max: 98.4 (12 Aug 2020 20:27)  HR: 92 (13 Aug 2020 01:00) (87 - 92)  BP: 133/52 (13 Aug 2020 01:00) (125/58 - 133/52)  BP(mean): --  RR: 17 (13 Aug 2020 01:00) (17 - 20)  SpO2: 100% (13 Aug 2020 01:00) (99% - 100%)

## 2020-08-13 NOTE — PROGRESS NOTE ADULT - PROBLEM SELECTOR PLAN 1
CHF education done with patient   Education provided including:  Signs and symptoms of CHF exacerbation   Daily Weights  What to do if symptoms worsen  How, when, and why to take medication  lifestyle changes  limiting sodium intake to 1500mg-2000mg daily  when to contact HCP  Ejection Fraction pending      Post d/c follow up appointment to be made by unit clerk when medically stable

## 2020-08-13 NOTE — PROGRESS NOTE ADULT - ASSESSMENT
88 YO F with PMH of Aortic, Mitral Stenosis, AFib, CVA, CHF, HTN, is admitted with CHF exacerbation.     #Acute on Chronic Diastolic Heart Failure  - Lasix 40 BID. Monitor electrolytes  - Metoprolol 25  - Echo 2018 - 50-60%. F/U Echo today.   - Evaluate Ambulation for Home O2  - D/C Planning 8/14  - Will need home visit physician on D/C - PCP listed is retired    #Afib  - Eliquis 2.5 BID    #Hypothyroidism  - Levothyroxine 112    #DVT PPX  - Eliquis 2.5 BID 89 YO F with PMH of Aortic, Mitral Stenosis, AFib, CVA, CHF, HTN, is admitted with CHF exacerbation.     #Acute on Chronic Diastolic Heart Failure  - Lasix 40 BID. Monitor electrolytes  - Metoprolol 25  - Echo 2018 - 50-60%. F/U Echo today.   - Evaluate Ambulation for Home O2  - D/C Planning 8/14  - Will need home visit physician on D/C - PCP listed is retired  - [facility] conversation had. DNR/DNI     #Afib  - Eliquis 2.5 BID    #Hypothyroidism  - Levothyroxine 112    #DVT PPX  - Eliquis 2.5 BID

## 2020-08-13 NOTE — CONSULT NOTE ADULT - PROBLEM SELECTOR RECOMMENDATION 2
She has been anticoagulated with Eliquis (despite presence of valve disease) and tolerating anticoagulation; management deferred to her outpatient cardiologist; rate is controlled.

## 2020-08-13 NOTE — H&P ADULT - NSICDXPASTMEDICALHX_GEN_ALL_CORE_FT
PAST MEDICAL HISTORY:  Atrial fibrillation     Cerebrovascular accident (CVA)     Chronic multifocal osteomyelitis, other site     Essential hypertension     Hypothyroidism     Scarlet fever

## 2020-08-13 NOTE — CHART NOTE - NSCHARTNOTEFT_GEN_A_CORE
Upon Nutritional Assessment by the Registered Dietitian your patient was determined to meet criteria / has evidence of the following diagnosis/diagnoses:          [ ]  Mild Protein Calorie Malnutrition        [x]  Moderate Protein Calorie Malnutrition        [ ] Severe Protein Calorie Malnutrition        [ ] Unspecified Protein Calorie Malnutrition        [x] Underweight / BMI <19        [ ] Morbid Obesity / BMI > 40      Findings as based on:  •  Comprehensive nutrition assessment and consultation  •  Calorie counts (nutrient intake analysis)  •  Food acceptance and intake status from observations by staff  •  Follow up  •  Patient education  •  Intervention secondary to interdisciplinary rounds  •   concerns    **Note to MD**  Pt likely meets criteria for severe protein-calorie malnutrition Due to pt being in airborne precaution room, RD unable to physically or visually assess the extent of muscle or fat wasting. If MD feels the pt has sustained significant loss of muscle or fat wasting, than please update assessment to severe protein-calorie malnutrition. Will continue to monitor and attempt physical assessment.        Treatment:    The following diet has been recommended:  -Pt refuses supplements  -Encourage PO intake and high protein or high calorie snacks    PROVIDER Section:     By signing this assessment you are acknowledging and agree with the diagnosis/diagnoses assigned by the Registered Dietitian    Comments:

## 2020-08-13 NOTE — CONSULT NOTE ADULT - ASSESSMENT
Pt is a 89y old Female with hx of Afib (Eliquis), Sever Mitral, Aortic Stenosis, CVA, CHF, HTN, Hypothyroidism presents for worsening SOB. Patient states that she's had worsening lower extremity edema bilaterally, doesn't know for how long. Patient states that power has gone out at home, and was brought to emergency room after she called 911. Patient is anxious to get home.   She does not want us talking to her son Riccardo who recently as per patient was in an accident and is very injured himself. Patient states that she lives alone and has aides for a few hours a day.     1) Acute on Chronic Diastolic Heart Failure  - c/w Lasix, Metoprolol   - Echo 2018 - 50-60%.  - F/U Echo today  - Evaluate Ambulation for Home O2  - daily weights   - cardiology consult appreciated     2) debility   - PT consult appreciated   - supportive care   - PPSV2: 40-50 %    3) Moderate protein calorie malnutrition   - underweight   - registered dietician consult appreciated     4) GOC/ Advance care planning   - patient refusing son to be called to schedule GOC meeting   - GOC meeting had today, f/u meeting 8/14 at 11AM   - MOLST: DNR/I

## 2020-08-13 NOTE — PATIENT PROFILE ADULT - NSPROEDALEARNPREF_GEN_A_NUR
Problem: Potential for Falls  Goal: Patient will remain free of falls  Description  INTERVENTIONS:  - Assess patient frequently for physical needs  -  Identify cognitive and physical deficits and behaviors that affect risk of falls    -  [place] fall precautions as indicated by assessment   - Educate patient/family on patient safety including physical limitations  - Instruct patient to call for assistance with activity based on assessment  - Modify environment to reduce risk of injury  - Consider OT/PT consult to assist with strengthening/mobility  Outcome: Progressing
Pt refusing to cooperate

## 2020-08-13 NOTE — GOALS OF CARE CONVERSATION - ADVANCED CARE PLANNING - CONVERSATION DETAILS
Discussed diagnosis of CHF with patient, and it is a disease that will not be cured, but can be managed with continuous effort by the patient.  Discussed patient's understanding of the disease process, and what they are currently doing to control their exacerbations of CHF, what to do if they begin to have symptoms of CHF, and who to call if they have an exacerbation.  Discussed importance of medication regimen and follow up with cardiologist.      Pt wants to be DNR/DNI.  Extensively spoke about the progression of supplemental oxygen and she was very interested in seeing what were more advanced levels of oxygen administration.  When I told her intubation is a possibility she said she absolutely did not want to be intubated.  She told me that she is 89 and everyone except her son Riccardo is already  and there's no point in prolonging life when she can't breathe

## 2020-08-13 NOTE — DIETITIAN INITIAL EVALUATION ADULT. - PERTINENT LABORATORY DATA
08-13 Na141 mmol/L Glu 90 mg/dL K+ 4.7 mmol/L Cr  1.00 mg/dL BUN 23 mg/dL Phos n/a   Alb n/a   PAB n/a

## 2020-08-13 NOTE — CONSULT NOTE ADULT - PROBLEM SELECTOR RECOMMENDATION 9
Severe AS and severe MS -- apparently was evaluated in the past for intervention with Dr Paez and declined.

## 2020-08-13 NOTE — PROGRESS NOTE ADULT - ASSESSMENT
PHYSICAL EXAM:  GENERAL: NAD, well-developed  HEAD:  Atraumatic, Normocephalic  EYES: EOMI, PERRLA, conjunctiva and sclera clear  NECK: Supple, No JVD  CHEST/LUNG: diminished throughout all lung fields  HEART: IRRegular rate and rhythm; Murmurs in all cardiac landmarks  ABDOMEN: Soft, Nontender, Nondistended; Bowel sounds present  EXTREMITIES:  2+ Peripheral Pulses, No clubbing, cyanosis, +1 BLLE edema  PSYCH: AAOx3  NEUROLOGY: non-focal  SKIN: no rashes or lesions

## 2020-08-13 NOTE — PROGRESS NOTE ADULT - SUBJECTIVE AND OBJECTIVE BOX
88 YO F with PMH of Afib (Eliquis), Sever Mitral, Aortic Stenosis, CVA, CHF, HTN, Hypothyroidism presents for worsening SOB. Patient states that she's had worsening lower extremity edema bilaterally, doesn't know for how long. Patient states that power has gone out at home, and was brought to emergency room. She denies fevers, chills, cough, chest pain. She does not want us talking to her son, and states that she lives at home alone, as most of her friends and family are dead.     Subjective - Patient states that she hasn't seen a physician in some time, wants a home visit, and knows about Mitral and Aortic Stenosis. She does not want treatment for that.     REVIEW OF SYSTEMS:  CONSTITUTIONAL: No weakness, fevers or chills  EYES/ENT: No visual changes;  No vertigo or throat pain   NECK: No pain or stiffness  RESPIRATORY: No cough, wheezing, hemoptysis;  shortness of breath worsened without electricity. States wants home oxygen  CARDIOVASCULAR: No chest pain or palpitations  GASTROINTESTINAL: No abdominal or epigastric pain. No nausea, vomiting, or hematemesis; No diarrhea or constipation. No melena or hematochezia.  GENITOURINARY: No dysuria, frequency or hematuria  NEUROLOGICAL: No numbness or weakness  SKIN: No itching, burning, rashes, or lesions. B/L swelling legs.   All other review of systems is negative unless indicated above    PHYSICAL EXAM:  Vital Signs Last 24 Hrs  T(C): 36.3 (13 Aug 2020 09:03), Max: 36.9 (12 Aug 2020 20:27)  T(F): 97.4 (13 Aug 2020 09:03), Max: 98.4 (12 Aug 2020 20:27)  HR: 93 (13 Aug 2020 09:03) (86 - 93)  BP: 94/61 (13 Aug 2020 09:03) (94/61 - 133/52)  BP(mean): --  RR: 18 (13 Aug 2020 09:03) (17 - 20)  SpO2: 98% (13 Aug 2020 09:03) (98% - 100%)    Constitutional: Pt lying in bed, awake and alert, NAD  HEENT: EOMI, normal hearing, moist mucous membranes  Neck: Soft and supple, no JVD  Respiratory: CTABL, No wheezing, rales or rhonchi  Cardiovascular: S1S2+, RRR, no M/G/R  Gastrointestinal: BS+, soft, NT/ND, no guarding, no rebound  Extremities: No peripheral edema  Vascular: 2+ peripheral pulses  Neurological: AAOx3, no focal deficits  Musculoskeletal: 5/5 strength b/l upper and lower extremities  Skin: No rashes 88 YO F with PMH of Afib (Eliquis), Sever Mitral, Aortic Stenosis, CVA, CHF, HTN, Hypothyroidism presents for worsening SOB. Patient states that she's had worsening lower extremity edema bilaterally, doesn't know for how long. Patient states that power has gone out at home, and was brought to emergency room. She denies fevers, chills, cough, chest pain. She does not want us talking to her son, and states that she lives at home alone, as most of her friends and family are dead.     Subjective - Patient states that she hasn't seen a physician in some time, wants a home visit, and knows about Mitral and Aortic Stenosis. She does not want treatment for that.     Home O2 requirement: In the setting of chronic diastolic heart failure and Severe mitral and aortic valvular disease, Pt has been having shortness of breath and will require oxygen at home for her chronic conditions.     REVIEW OF SYSTEMS:  CONSTITUTIONAL: No weakness, fevers or chills  EYES/ENT: No visual changes;  No vertigo or throat pain   NECK: No pain or stiffness  RESPIRATORY: No cough, wheezing, hemoptysis;  shortness of breath worsened without electricity. States wants home oxygen  CARDIOVASCULAR: No chest pain or palpitations  GASTROINTESTINAL: No abdominal or epigastric pain. No nausea, vomiting, or hematemesis; No diarrhea or constipation. No melena or hematochezia.  GENITOURINARY: No dysuria, frequency or hematuria  NEUROLOGICAL: No numbness or weakness  SKIN: No itching, burning, rashes, or lesions. B/L swelling legs.   All other review of systems is negative unless indicated above    PHYSICAL EXAM:  Vital Signs Last 24 Hrs  T(C): 36.3 (13 Aug 2020 09:03), Max: 36.9 (12 Aug 2020 20:27)  T(F): 97.4 (13 Aug 2020 09:03), Max: 98.4 (12 Aug 2020 20:27)  HR: 93 (13 Aug 2020 09:03) (86 - 93)  BP: 94/61 (13 Aug 2020 09:03) (94/61 - 133/52)  BP(mean): --  RR: 18 (13 Aug 2020 09:03) (17 - 20)  SpO2: 98% (13 Aug 2020 09:03) (98% - 100%)    Constitutional: Pt lying in bed, awake and alert, NAD  HEENT: EOMI, normal hearing, moist mucous membranes  Neck: Soft and supple, no JVD  Respiratory: CTABL, No wheezing, rales or rhonchi  Cardiovascular: S1S2+, RRR, no M/G/R  Gastrointestinal: BS+, soft, NT/ND, no guarding, no rebound  Extremities: No peripheral edema  Vascular: 2+ peripheral pulses  Neurological: AAOx3, no focal deficits  Musculoskeletal: 5/5 strength b/l upper and lower extremities  Skin: No rashes

## 2020-08-13 NOTE — DIETITIAN INITIAL EVALUATION ADULT. - PERTINENT MEDS FT
MEDICATIONS  (STANDING):  aspirin  chewable 81 milliGRAM(s) Oral once  furosemide   Injectable 40 milliGRAM(s) IV Push two times a day  levothyroxine 112 MICROGram(s) Oral daily  metoprolol tartrate 25 milliGRAM(s) Oral two times a day  potassium chloride    Tablet ER 20 milliEquivalent(s) Oral two times a day    MEDICATIONS  (PRN):

## 2020-08-13 NOTE — CHART NOTE - NSCHARTNOTEFT_GEN_A_CORE
HOME O2 EVALUATION    Pulse Ox Room Air Rest: 98%    Pulse Ox Room Air Ambulatin%    Pulse Ox on O2  N/A        L Ambulating: N/A    Pulse Ox Post Ambulation: 97%

## 2020-08-13 NOTE — H&P ADULT - NSICDXFAMILYHX_GEN_ALL_CORE_FT
FAMILY HISTORY:  Sibling  Still living? Unknown  Family history of heart disease, Age at diagnosis: Age Unknown

## 2020-08-13 NOTE — H&P ADULT - ASSESSMENT
88 yo female with PMH of a. fib on eliquis, h/o CVA, CHF, HTN, hypothyroidism, h/o scarlet fever as child admitted with:    #Acute diastolic CHF exacerbation  - admit to tele  - diuresis with IV lasix 40mg BID  - continue metoprolol  - echo  - cardio consult  - strict I/Os, daily weights  - BNP 8243    #A. fib  - continue eliquis  - continue metoprolol    #h/o CVA  - PT eval    #Hypothyroidism  - continue synthroid    #DVT prophylaxis  - on eliqus    #Advance Directives  - DNR/DNI, pt declined to complete MOLST at this time, states should have previous records on computer from previous admission, advised now need to complete MOLST, pt states she does not have her glasses with her to complete form right now      IMPROVE VTE Individual Risk Assessment    RISK                                                                Points    [  ] Previous VTE                                                  3    [  ] Thrombophilia                                               2    [  ] Lower limb paralysis                                      2        (unable to hold up >15 seconds)      [  ] Current Cancer                                              2         (within 6 months)    [  ] Immobilization > 24 hrs                                1    [  ] ICU/CCU stay > 24 hours                              1    [x  ] Age > 60                                                      1    IMPROVE VTE Score ___1______    IMPROVE Score 0-1: Low Risk, No VTE prophylaxis required for most patients, encourage ambulation.   IMPROVE Score 2-3: At risk, pharmacologic VTE prophylaxis is indicated for most patients (in the absence of a contraindication)  IMPROVE Score > or = 4: High Risk, pharmacologic VTE prophylaxis is indicated for most patients (in the absence of a contraindication)

## 2020-08-13 NOTE — PHYSICAL THERAPY INITIAL EVALUATION ADULT - ACTIVE RANGE OF MOTION EXAMINATION, REHAB EVAL
Left LE Active ROM was WFL (within functional limits)/bilateral upper extremity Active ROM was WFL (within functional limits)/ltd R hip/knee flex (~30 deg)-pt reports due to OM dx 7/'20, KE ~0 deg, DF WFL

## 2020-08-13 NOTE — CONSULT NOTE ADULT - SUBJECTIVE AND OBJECTIVE BOX
HPI: Pt is a 89y old Female with hx of Afib (Eliquis), Sever Mitral, Aortic Stenosis, CVA, CHF, HTN, Hypothyroidism presents for worsening SOB. Patient states that she's had worsening lower extremity edema bilaterally, doesn't know for how long. Patient states that power has gone out at home, and was brought to emergency room after she called 911. Patient is anxious to get home.   She does not want us talking to her son Riccardo who recently as per patient was in an accident and is very injured himself. Patient states that she lives alone and has aides for a few hours a day.     PAIN: ( )Yes   ( x)No  DYSPNEA: (x ) Yes  ( ) No  Level: severe - O2 in place patient states gets worse on exertion     PAST MEDICAL & SURGICAL HISTORY:  Afib  CVA (cerebral vascular accident)  Hypothyroidism  Cerebrovascular accident (CVA)  Atrial fibrillation  Essential hypertension  Chronic multifocal osteomyelitis, other site  Scarlet fever  S/P debridement      SOCIAL HX:    Hx opiate tolerance ( )YES  (x )NO    Baseline ADLs  (Prior to Admission)  ( ) Independent   (x )Dependent    FAMILY HISTORY:  Family history of heart disease (Sibling and her father)       Review of Systems:    Anxiety- moderate   Depression- denies  Physical Discomfort- periods of breathlessness   Dyspnea- severe   Constipation- denies   Diarrhea- denies   Nausea- denies   Vomiting- denies   Anorexia- denies   Weight Loss- denies   Cough-denies   Secretions-denies   Fatigue- moderate   Weakness- moderate, "when you don't use it, you lose it"  Delirium- denies     All other systems reviewed and negative    PHYSICAL EXAM:    Vital Signs Last 24 Hrs  T(C): 36.3 (13 Aug 2020 09:03), Max: 36.9 (12 Aug 2020 20:27)  T(F): 97.4 (13 Aug 2020 09:03), Max: 98.4 (12 Aug 2020 20:27)  HR: 93 (13 Aug 2020 09:03) (86 - 93)  BP: 94/61 (13 Aug 2020 09:03) (94/61 - 133/52)  RR: 18 (13 Aug 2020 09:03) (17 - 20)  SpO2: 98% (13 Aug 2020 09:03) (98% - 100%)  Daily Height in cm: 152.4 (12 Aug 2020 20:27)    Daily Weight in k.9 (13 Aug 2020 09:21)    PPSV2: 40-50  %    General: pleasant, cachetic  elderly female in bed, mild respiratory distress   Mental Status: alert and oriented, forgetful at times   HEENT: + temporal wasting   Lungs:  diminished b/l breath sounds, periods of breathlessness   Cardiac: S1S2 +   GI: nontender, nondistended, + BS   : + voiding   Ext: no edema present   Neuro: non focal       LABS:                        11.6   7.49  )-----------( 192      ( 13 Aug 2020 06:54 )             37.1     08-13    141  |  103  |  23  ----------------------------<  90  4.7   |  37<H>  |  1.00    Ca    8.8      13 Aug 2020 06:54  Mg     2.4         TPro  6.7  /  Alb  3.2<L>  /  TBili  0.8  /  DBili  x   /  AST  25  /  ALT  24  /  AlkPhos  76  08-12    PT/INR - ( 12 Aug 2020 21:47 )   PT: 14.3 sec;   INR: 1.23 ratio         PTT - ( 12 Aug 2020 21:47 )  PTT:31.2 sec  Albumin: Albumin, Serum: 3.2 g/dL ( @ 21:47)      Allergies    No Known Allergies    Intolerances      MEDICATIONS  (STANDING):  apixaban 2.5 milliGRAM(s) Oral every 12 hours  furosemide   Injectable 40 milliGRAM(s) IV Push two times a day  levothyroxine 112 MICROGram(s) Oral daily  metoprolol tartrate 25 milliGRAM(s) Oral two times a day  potassium chloride    Tablet ER 20 milliEquivalent(s) Oral two times a day    MEDICATIONS  (PRN):      RADIOLOGY/ADDITIONAL STUDIES:  < from: Xray Chest 1 View- PORTABLE-Urgent (20 @ 22:14) >  EXAM:  XR CHEST PORTABLE URGENT 1V                            PROCEDURE DATE:  2020          INTERPRETATION:  Portable chest radiograph    CLINICAL INFORMATION:   Short of breath.    TECHNIQUE:  Portable  AP view of the chest was obtained.    COMPARISON: 2018 chest radiograph available for review.    FINDINGS:  The lungs show new RIGHT greater than LEFT bibasilar pleural effusions and/or RIGHT basilar airspace consolidation. Upper lobes clear.    The  heart is enlarged in transverse diameter. No hilar mass. Trachea midline.  There is a heavily calcified mitral annulus.  .      Visualized osseous structures are intact.        IMPRESSION:   Cardiomegaly.  RIGHT greater than LEFT pleural effusions..    < end of copied text > HPI: Pt is a 89y old Female with hx of Afib (Eliquis), Sever Mitral, Aortic Stenosis, CVA, CHF, HTN, Hypothyroidism presents for worsening SOB. Patient states that she's had worsening lower extremity edema bilaterally, doesn't know for how long. Patient states that power has gone out at home, and was brought to emergency room after she called 911. Patient is anxious to get home.   She does not want us talking to her son Riccardo who recently as per patient was in an accident and is very injured himself. Patient states that she lives alone and has aides for a few hours a day.     PAIN: ( )Yes   ( x)No  DYSPNEA: (x ) Yes  ( ) No  Level: severe - O2 in place patient states gets worse on exertion     PAST MEDICAL & SURGICAL HISTORY:  Afib  CVA (cerebral vascular accident)  Hypothyroidism  Cerebrovascular accident (CVA)  Atrial fibrillation  Essential hypertension  Chronic multifocal osteomyelitis, other site  Scarlet fever  S/P debridement      SOCIAL HX:    Hx opiate tolerance ( )YES  (x )NO    Baseline ADLs  (Prior to Admission)  ( ) Independent   (x )Dependent    FAMILY HISTORY:  Family history of heart disease (Sibling and her father)       Review of Systems:    Anxiety- moderate   Depression- denies  Physical Discomfort- periods of breathlessness   Dyspnea- severe   Constipation- denies   Diarrhea- denies   Nausea- denies   Vomiting- denies   Anorexia- denies   Weight Loss- denies   Cough-denies   Secretions-denies   Fatigue- moderate   Weakness- moderate, "when you don't use it, you lose it"  Delirium- denies     All other systems reviewed and negative    PHYSICAL EXAM:    Vital Signs Last 24 Hrs  T(C): 36.3 (13 Aug 2020 09:03), Max: 36.9 (12 Aug 2020 20:27)  T(F): 97.4 (13 Aug 2020 09:03), Max: 98.4 (12 Aug 2020 20:27)  HR: 93 (13 Aug 2020 09:03) (86 - 93)  BP: 94/61 (13 Aug 2020 09:03) (94/61 - 133/52)  RR: 18 (13 Aug 2020 09:03) (17 - 20)  SpO2: 98% (13 Aug 2020 09:03) (98% - 100%)  Daily Height in cm: 152.4 (12 Aug 2020 20:27)    Daily Weight in k.9 (13 Aug 2020 09:21)    PPSV2: 40-50  %    General: pleasant, 	elderly female in bed, mild respiratory distress   Mental Status: alert and oriented, forgetful at times   HEENT: + temporal wasting   Lungs:  diminished b/l breath sounds, periods of breathlessness   Cardiac: S1S2 +   GI: nontender, nondistended, + BS   : + voiding   Ext: no edema present   Neuro: non focal       LABS:                        11.6   7.49  )-----------( 192      ( 13 Aug 2020 06:54 )             37.1     08-13    141  |  103  |  23  ----------------------------<  90  4.7   |  37<H>  |  1.00    Ca    8.8      13 Aug 2020 06:54  Mg     2.4     08-    TPro  6.7  /  Alb  3.2<L>  /  TBili  0.8  /  DBili  x   /  AST  25  /  ALT  24  /  AlkPhos  76  08-12    PT/INR - ( 12 Aug 2020 21:47 )   PT: 14.3 sec;   INR: 1.23 ratio         PTT - ( 12 Aug 2020 21:47 )  PTT:31.2 sec  Albumin: Albumin, Serum: 3.2 g/dL ( @ 21:47)      Allergies    No Known Allergies    Intolerances      MEDICATIONS  (STANDING):  apixaban 2.5 milliGRAM(s) Oral every 12 hours  furosemide   Injectable 40 milliGRAM(s) IV Push two times a day  levothyroxine 112 MICROGram(s) Oral daily  metoprolol tartrate 25 milliGRAM(s) Oral two times a day  potassium chloride    Tablet ER 20 milliEquivalent(s) Oral two times a day    MEDICATIONS  (PRN):      RADIOLOGY/ADDITIONAL STUDIES:  < from: Xray Chest 1 View- PORTABLE-Urgent (20 @ 22:14) >  EXAM:  XR CHEST PORTABLE URGENT 1V                            PROCEDURE DATE:  2020          INTERPRETATION:  Portable chest radiograph    CLINICAL INFORMATION:   Short of breath.    TECHNIQUE:  Portable  AP view of the chest was obtained.    COMPARISON: 2018 chest radiograph available for review.    FINDINGS:  The lungs show new RIGHT greater than LEFT bibasilar pleural effusions and/or RIGHT basilar airspace consolidation. Upper lobes clear.    The  heart is enlarged in transverse diameter. No hilar mass. Trachea midline.  There is a heavily calcified mitral annulus.  .      Visualized osseous structures are intact.        IMPRESSION:   Cardiomegaly.  RIGHT greater than LEFT pleural effusions..    < end of copied text >

## 2020-08-13 NOTE — PROGRESS NOTE ADULT - SUBJECTIVE AND OBJECTIVE BOX
Patient is a 89y old  Female who presents with a chief complaint of SOB (13 Aug 2020 07:55)    HPI:  89 yo female with PMH of a. fib on eliquis, h/o CVA, CHF, HTN, hypothyroidism, h/o scarlet fever as child presents to ED with SOB. Pt states power went out at home today and then developed SOB. Worse with exertion. EMS called and patient brought to the ED. She does have LE edema but does not know how long she had it for. Does not check her weights at home. Denies any fevers, cough, CP, lightheadedness, abd pain, N/V/D, dysuria. Pt is very tangentially in her thoughts and further history limited. (13 Aug 2020 02:52)    8/13/2020- pt denies any cp, appears tachypneic and mildly conversationally SOB.  on 5L NC.  came into ER with shortness of breath secondary to the heat from her AC being out from no power.  Currently on BB and lasix.  Does not weigh herself consistently, has a good long term memory but defers short term occurrences about the events leading up to her hospitalization.

## 2020-08-13 NOTE — H&P ADULT - HISTORY OF PRESENT ILLNESS
89 yo female with PMH of a. fib on eliquis, h/o CVA, CHF, HTN, hypothyroidism, h/o scarlet fever as child presents to ED with SOB. Pt states power went out at home today and then developed SOB. Worse with exertion. EMS called and patient brought to the ED. She does have LE edema but does not know how long she had it for. Does not check her weights at home. Denies any fevers, cough, CP, lightheadedness, abd pain, N/V/D, dysuria. Pt is very tangentially in her thoughts and further history limited.

## 2020-08-13 NOTE — H&P ADULT - NSHPLABSRESULTS_GEN_ALL_CORE
EKG a. fib with non specific ST-T wave changes (ST depressions), similar to EKG from 2018 but in a. flutter at the time

## 2020-08-13 NOTE — PHYSICAL THERAPY INITIAL EVALUATION ADULT - MANUAL MUSCLE TESTING RESULTS, REHAB EVAL
except L hip/knee ltd, grossly assessed as pt distracted by her c/o/no strength deficits were identified

## 2020-08-13 NOTE — CONSULT NOTE ADULT - SUBJECTIVE AND OBJECTIVE BOX
CHIEF COMPLAINT: Patient is a 89y old  Female who presents with a chief complaint of SOB (13 Aug 2020 02:52)    HPI:  89 year old woman with a history of AF, CVA, CHF, HTN, hypothyroidism, and valvular heart disease (severe mitral and aortic stenosis) was brought to the ER after she lost power at her home, became anxious and dyspneic; subsequently brought to the ER.  She describes chronic illness - having trouble with getting a home care MD and has not seen her usual cardiologist (Dr Bill) in "quite some time."    She experiences shortness of breath - chronic; uses supplemental O2 at home.  She denies angina; dyspnea worsens with exertion and is relieved with rest and O2.    PAST MEDICAL & SURGICAL HISTORY:  Hypothyroidism  Cerebrovascular accident (CVA)  Atrial fibrillation  Essential hypertension  Chronic multifocal osteomyelitis, other site  Scarlet fever  S/P debridement    SOCIAL HISTORY:   Alcohol: Denied  Smoking: Nonsmoker    FAMILY HISTORY: Family history of heart disease (Sibling)    MEDICATIONS  (STANDING):  aspirin  chewable 81 milliGRAM(s) Oral once  furosemide   Injectable 40 milliGRAM(s) IV Push two times a day  levothyroxine 112 MICROGram(s) Oral daily  metoprolol tartrate 25 milliGRAM(s) Oral two times a day  potassium chloride    Tablet ER 20 milliEquivalent(s) Oral two times a day    Allergies:  No Known Allergies    REVIEW OF SYSTEMS:  CONSTITUTIONAL: No fevers or chills  Eyes: No visual changes  NECK: No pain or stiffness  RESPIRATORY: No cough, wheezing, hemoptysis; + shortness of breath  CARDIOVASCULAR: No chest pain or palpitations  GASTROINTESTINAL: No abdominal pain. No nausea, vomiting, or hematemesis; No diarrhea or constipation. No melena or hematochezia.  GENITOURINARY: No dysuria, frequency or hematuria  NEUROLOGICAL: No numbness.  SKIN: No itching or rash  All other review of systems is negative unless indicated above    VITAL SIGNS:   Vital Signs Last 24 Hrs  T(C): 36.8 (13 Aug 2020 01:00), Max: 36.9 (12 Aug 2020 20:27)  T(F): 98.2 (13 Aug 2020 01:00), Max: 98.4 (12 Aug 2020 20:27)  HR: 86 (13 Aug 2020 03:00) (86 - 92)  BP: 110/49 (13 Aug 2020 03:00) (110/49 - 133/52)  RR: 18 (13 Aug 2020 03:00) (17 - 20)  SpO2: 100% (13 Aug 2020 03:00) (99% - 100%)    PHYSICAL EXAM:  Constitutional: NAD, awake and alert, appears stated age  HEENT:  No oral cyanosis.  Pulmonary: Non-labored, breath sounds are clear bilaterally, No wheezing, rales or rhonchi  Cardiovascular: Irregular, systolic and diastolic murmurs  Gastrointestinal: Bowel Sounds present, soft, nontender.   Lymph: 1+ leg edema. No cervical lymphadenopathy.  Neurological: Alert, no focal deficits  Skin: No rashes.  Psych:  Mood & affect appropriate    LABS:                     11.6   7.49  )-----------( 192      ( 13 Aug 2020 06:54 )             37.1                    141    |  103    |  23     ----------------------------<  90     4.7     |  37     |  1.00     TPro  6.7    /  Alb  3.2    /  TBili  0.8    /  DBili  x      /  AST  25     /  ALT  24     /  AlkPhos  76     12 Aug 2020 21:47    PT/INR - ( 12 Aug 2020 21:47 )   PT: 14.3 sec;   INR: 1.23 ratio    PTT - ( 12 Aug 2020 21:47 )  PTT:31.2 sec    CARDIAC MARKERS ( 12 Aug 2020 21:47 ) <0.015 ng/mL / x     / x     / x     / x        Pro Bnp 8243    Transthoracic Echocardiogram (04.16.18 @ 11:40):   Severe mitral stenosis is present.  Moderate (2+) mitral regurgitation is present. Severe mitral annular calcification is present.   Significant fibrocalcific changes noted to the aortic valve leaflets with restriction in leaflet excursion. Peak transaortic gradient is 68 mmHg; this finding is consistent with severe aortic stenosis.   Mild (1+) aortic regurgitation is present.   Normal appearing tricuspid valve structure and function. Mild to moderate tricuspid valve regurgitation is present.   The left atrium is severely dilated.   The left ventricle is normal in size, wall thickness, wall motion and contractility. Estimated left ventricular ejection fraction is 55-60 %.   Normal appearing right ventricle structure and function.   No evidence of pericardial effusion.    Tele: AF

## 2020-08-13 NOTE — ED ADULT NURSE REASSESSMENT NOTE - NS ED NURSE REASSESS COMMENT FT1
pt visibly in pain, BP elevated and elevated temp.  given tylenol for temp and morphine IVP for pain.  will continue to monitor.

## 2020-08-13 NOTE — CONSULT NOTE ADULT - CONVERSATION DETAILS
Met with patient at bedside and reviewed the role of palliative medicine.  Patient stated that she was at home when she lost power and called 911 and was brought to the hospital. Patient lives at home alone as most of her family besides her son is , She does have aide services in place but unsure how many hours a week. Asked patient if willing to set up family meeting with her son (cole) she stated no as he was recently in an accident and has broken ribs and an issue with his sternum and he is not available to speak to at this time.  Patient states that she has had heart disease for a long time and doesn't not want to go to the doctor the only reason she came to the hospital was because she did not have electricity. Patient stated that she is ok and able to manage at home.  Spoke with patient about d/c and she stated that she wants to go home, and that she thinks she will be ok. Explained that may not be the safest option at this time as she is weak and doesn't have 24 hour care at home.  She stated with the virus that she will not go to [facility].      During our conversation patient was becoming breathlessness while speaking and she wanted to finish conversation tomorrow at 11 AM.  Before concluding our meeting spoke with patient about her advance directives and stated that she wants to be a DNR/I, filled out a MOLST and placed in patients chart.     Emotional support provided to patient and will continue to follow, follow up [facility] meeting tomorrow 2020 at 11 AM

## 2020-08-13 NOTE — PHYSICAL THERAPY INITIAL EVALUATION ADULT - ADDITIONAL COMMENTS
unable to obtain PLOF as pt continually talking, complaining, insisting PT assist w/ applying adult diaper (than upset that diaper not same as typically used at home)

## 2020-08-13 NOTE — DIETITIAN INITIAL EVALUATION ADULT. - OTHER INFO
88 yo female with PMH of a. fib on eliquis, h/o CVA, CHF, HTN, hypothyroidism, h/o scarlet fever as child presents to ED with SOB. Pt states power went out at home today and then developed SOB. Worse with exertion. EMS called and patient brought to the ED. She does have LE edema but does not know how long she had it for. Does not check her weights at home. Denies any fevers, cough, CP, lightheadedness, abd pain, N/V/D, dysuria. Pt is very tangentially in her thoughts and further history limited.     Pt seen for assessment 2/2 h/x of malnutrition. Per past RD note on (04/19/18) Recommended MVI/minerals supplements post discharge, pt ambivalent of supplements, but may try. pt refused Ensure supplementation. Rejected suggestion for appetite stimulant, adverse to taking more pills. Pt receiving coumadin in patient, educated on consistent intake of Vit. K. Education materials provided. Appears thin, BMI 20.5. 1+ edema to R/L legs noted, may be masking additional wt loss. Pt was on lasix at home, but is not on it currently. Eric 20. NFPE reveals moderate muscle wasting of the temporal, clavicle, scapula. Moderate fat loss of triceps. Pt meets criteria for moderate malnutrition in context of social circumstances secondary to variable po intake, meeting <75% ENN x5 days, wt loss, moderate muscle wasting, moderate fat loss.    Since last admission pt has continued to lose weight (47.6kg---->41kg) appears to be a 13.3% of BW weight loss (unknown clinical sig). Pt historically was refusing supplements, likely pt has continued to lose weight since last admission. 2+ edema in left and right leg; eric 16; Pt called over telephone (currently on airborne protection, conserving PPE). Pt was somewhat distracted. Pt states she is not supposed to be here. Pt was not concerned about weight, but it appears pt's current weight is 10lbs less than her self reported BW. Pt also states she isn't a big eater. Continued to refuse supplements. Pt's PO intake appears <75% of NN over the past few months. PT likely meets criteria for severe protein-calorie malnutrition (but currently can only meet criteria for moderate protein-calorie malnutrition. Recommend continuing to encourage PO intake, will continue to monitor pt's nutritional status.

## 2020-08-14 ENCOUNTER — TRANSCRIPTION ENCOUNTER (OUTPATIENT)
Age: 85
End: 2020-08-14

## 2020-08-14 LAB
ALBUMIN SERPL ELPH-MCNC: 3 G/DL — LOW (ref 3.3–5)
ALP SERPL-CCNC: 65 U/L — SIGNIFICANT CHANGE UP (ref 40–120)
ALT FLD-CCNC: 25 U/L — SIGNIFICANT CHANGE UP (ref 12–78)
ANION GAP SERPL CALC-SCNC: 1 MMOL/L — LOW (ref 5–17)
AST SERPL-CCNC: 24 U/L — SIGNIFICANT CHANGE UP (ref 15–37)
BILIRUB SERPL-MCNC: 0.8 MG/DL — SIGNIFICANT CHANGE UP (ref 0.2–1.2)
BUN SERPL-MCNC: 24 MG/DL — HIGH (ref 7–23)
CALCIUM SERPL-MCNC: 9.1 MG/DL — SIGNIFICANT CHANGE UP (ref 8.5–10.1)
CHLORIDE SERPL-SCNC: 102 MMOL/L — SIGNIFICANT CHANGE UP (ref 96–108)
CO2 SERPL-SCNC: 37 MMOL/L — HIGH (ref 22–31)
CREAT SERPL-MCNC: 1.03 MG/DL — SIGNIFICANT CHANGE UP (ref 0.5–1.3)
GLUCOSE SERPL-MCNC: 78 MG/DL — SIGNIFICANT CHANGE UP (ref 70–99)
POTASSIUM SERPL-MCNC: 3.9 MMOL/L — SIGNIFICANT CHANGE UP (ref 3.5–5.3)
POTASSIUM SERPL-SCNC: 3.9 MMOL/L — SIGNIFICANT CHANGE UP (ref 3.5–5.3)
PROT SERPL-MCNC: 5.9 GM/DL — LOW (ref 6–8.3)
SARS-COV-2 IGG SERPL QL IA: NEGATIVE — SIGNIFICANT CHANGE UP
SARS-COV-2 IGM SERPL IA-ACNC: 0.03 INDEX — SIGNIFICANT CHANGE UP
SODIUM SERPL-SCNC: 140 MMOL/L — SIGNIFICANT CHANGE UP (ref 135–145)

## 2020-08-14 PROCEDURE — 99497 ADVNCD CARE PLAN 30 MIN: CPT

## 2020-08-14 PROCEDURE — 99232 SBSQ HOSP IP/OBS MODERATE 35: CPT | Mod: GC

## 2020-08-14 PROCEDURE — 99232 SBSQ HOSP IP/OBS MODERATE 35: CPT | Mod: 25

## 2020-08-14 RX ADMIN — Medication 20 MILLIEQUIVALENT(S): at 21:02

## 2020-08-14 RX ADMIN — Medication 40 MILLIGRAM(S): at 10:17

## 2020-08-14 RX ADMIN — Medication 112 MICROGRAM(S): at 06:38

## 2020-08-14 RX ADMIN — Medication 25 MILLIGRAM(S): at 10:17

## 2020-08-14 RX ADMIN — Medication 20 MILLIEQUIVALENT(S): at 10:17

## 2020-08-14 RX ADMIN — APIXABAN 2.5 MILLIGRAM(S): 2.5 TABLET, FILM COATED ORAL at 10:18

## 2020-08-14 RX ADMIN — APIXABAN 2.5 MILLIGRAM(S): 2.5 TABLET, FILM COATED ORAL at 21:02

## 2020-08-14 NOTE — PROGRESS NOTE ADULT - SUBJECTIVE AND OBJECTIVE BOX
HPI: Pt is a 89y old Female with hx of Afib (Eliquis), Sever Mitral, Aortic Stenosis, CVA, CHF, HTN, Hypothyroidism presents for worsening SOB. Patient states that she's had worsening lower extremity edema bilaterally, doesn't know for how long. Patient states that power has gone out at home, and was brought to emergency room after she called 911. Patient is anxious to get home.   She does not want us talking to her son Riccardo who recently as per patient was in an accident and is very injured himself. Patient states that she lives alone and has aides for a few hours a day.     PAIN: ( )Yes   ( x)No  DYSPNEA: (x ) Yes  ( ) No  Level: severe - O2 in place patient states gets worse on exertion     Review of Systems:    Anxiety- moderate   Depression- denies  Physical Discomfort- periods of breathlessness   Dyspnea- severe   Constipation- denies   Diarrhea- denies   Nausea- denies   Vomiting- denies   Anorexia- denies   Weight Loss- denies   Cough-denies   Secretions-denies   Fatigue- moderate   Weakness- moderate, "when you don't use it, you lose it"  Delirium- denies     All other systems reviewed and negative    PHYSICAL EXAM:    Vital Signs Last 24 Hrs  T(C): 36.4 (13 Aug 2020 21:15), Max: 36.4 (13 Aug 2020 21:15)  T(F): 97.6 (13 Aug 2020 21:15), Max: 97.6 (13 Aug 2020 21:15)  HR: 92 (13 Aug 2020 22:28) (92 - 95)  BP: 109/59 (13 Aug 2020 22:28) (98/62 - 116/62)  BP(mean): --  RR: 17 (13 Aug 2020 22:28) (17 - 18)  SpO2: 100% (13 Aug 2020 22:28) (100% - 100%)    PPSV2: 40-50  %    General: pleasant, elderly female in bed, mild respiratory distress   Mental Status: alert and oriented, forgetful at times   HEENT: + temporal wasting   Lungs:  diminished b/l breath sounds, periods of breathlessness   Cardiac: S1S2 +   GI: nontender, nondistended, + BS   : + voiding   Ext: no edema present   Neuro: non focal       LABS:                        11.6   7.49  )-----------( 192      ( 13 Aug 2020 06:54 )             37.1     08-14    140  |  102  |  24<H>  ----------------------------<  78  3.9   |  37<H>  |  1.03    Ca    9.1      14 Aug 2020 06:38  Mg     2.4     08-13    TPro  5.9<L>  /  Alb  3.0<L>  /  TBili  0.8  /  DBili  x   /  AST  24  /  ALT  25  /  AlkPhos  65  08-14    PT/INR - ( 12 Aug 2020 21:47 )   PT: 14.3 sec;   INR: 1.23 ratio         PTT - ( 12 Aug 2020 21:47 )  PTT:31.2 sec  Albumin: Albumin, Serum: 3.0 g/dL (08-14 @ 06:38)      Allergies    No Known Allergies    Intolerances      MEDICATIONS  (STANDING):  apixaban 2.5 milliGRAM(s) Oral every 12 hours  furosemide   Injectable 40 milliGRAM(s) IV Push two times a day  levothyroxine 112 MICROGram(s) Oral daily  metoprolol tartrate 25 milliGRAM(s) Oral two times a day  potassium chloride    Tablet ER 20 milliEquivalent(s) Oral two times a day    MEDICATIONS  (PRN):

## 2020-08-14 NOTE — PROGRESS NOTE ADULT - CONVERSATION DETAILS
Team met with pt. at bedside to discuss goals of care, assist with planning and provide supportive counseling. Pt. lives at home alone and has private hire aides 7 days a week from 10AM-6PM. Pt. has a son but did not have his phone number and no number was on file. Pt. reports she has no other family members and expressed having a difficult relationship with her son. and recently he got hurt and she doesn't think he would be available for a conversations     Pts current medical condition and goals of care discussed. Pt. expressed that she has been having a difficult time breathing. She declines any medications to assist with her shortness of breath and reports that the team does "not know what you're talking about". We discussed pts goals and the option of focusing on her comfort with the support of home hospice. Team reviewed hospice services and philosophy of care in detail. Pt. declined hospice at this time as she expressed feeling it likely would not be helpful. Team discussed the concern that pt. may end up coming back and forth to the hospital. She is aware of this but is not ready to put hospice in place. DARLENE Gill is working on trying to get pt. House calls as she does not have a PCP. Pt. was clear on her wishes for DNR/DNI. MOLST form with DNR/DNI on chart.     Plan at this time is for pt. to return home with private hire aides. Emotional support provided. Our team will continue to follow.

## 2020-08-14 NOTE — PROGRESS NOTE ADULT - SUBJECTIVE AND OBJECTIVE BOX
89 YO F with PMH of Afib (Eliquis), Sever Mitral, Aortic Stenosis, CVA, CHF, HTN, Hypothyroidism presents for worsening SOB. Patient states that she's had worsening lower extremity edema bilaterally, doesn't know for how long. Patient states that power has gone out at home, and was brought to emergency room. She denies fevers, chills, cough, chest pain. She does not want us talking to her son, and states that she lives at home alone, as most of her friends and family are dead.     Subjective - Patient states that she wishes to return home, and does not want rehab care. She will be seen by a PCP with home visits and will have an aid that will receive her at home with oxygen capabilities.     Home O2 requirement: In the setting of chronic diastolic heart failure and Severe mitral and aortic valvular disease, Pt has been having shortness of breath and will require oxygen at home for her chronic conditions.       REVIEW OF SYSTEMS:  CONSTITUTIONAL: No weakness, fevers or chills  EYES/ENT: No visual changes;  No vertigo or throat pain   NECK: No pain or stiffness  RESPIRATORY: No cough, wheezing, hemoptysis; + shortness of breath when speaking  CARDIOVASCULAR: No chest pain or palpitations  GASTROINTESTINAL: No abdominal or epigastric pain. No nausea, vomiting, or hematemesis; No diarrhea or constipation. No melena or hematochezia.  GENITOURINARY: No dysuria, frequency or hematuria  NEUROLOGICAL: No numbness or weakness  SKIN: No itching, burning, rashes, or lesions   All other review of systems is negative unless indicated above    PHYSICAL EXAM:  Vital Signs Last 24 Hrs  T(C): 36.3 (14 Aug 2020 15:48), Max: 36.4 (13 Aug 2020 21:15)  T(F): 97.4 (14 Aug 2020 15:48), Max: 97.6 (13 Aug 2020 21:15)  HR: 94 (14 Aug 2020 15:48) (92 - 95)  BP: 105/83 (14 Aug 2020 15:48) (98/62 - 116/62)  BP(mean): --  RR: 20 (14 Aug 2020 15:48) (17 - 20)      Constitutional: Pt lying in bed, awake and alert, NAD  HEENT: EOMI, normal hearing, moist mucous membranes  Neck: Soft and supple, no JVD  Respiratory: CTABL, No wheezing, rales or rhonchi  Cardiovascular: Sysolic Murmur noted at [place].   Gastrointestinal: BS+, soft, NT/ND, no guarding, no rebound  Extremities: No peripheral edema  Vascular: 2+ peripheral pulses  Neurological: AAOx3, no focal deficits  Musculoskeletal: 5/5 strength b/l upper and lower extremities  Skin: No rashes    MEDICATIONS:  MEDICATIONS  (STANDING):  apixaban 2.5 milliGRAM(s) Oral every 12 hours  furosemide   Injectable 40 milliGRAM(s) IV Push two times a day  levothyroxine 112 MICROGram(s) Oral daily  metoprolol tartrate 25 milliGRAM(s) Oral two times a day  potassium chloride    Tablet ER 20 milliEquivalent(s) Oral two times a day      LABS: All Labs Reviewed:                        11.6   7.49  )-----------( 192      ( 13 Aug 2020 06:54 )             37.1     08-14    140  |  102  |  24<H>  ----------------------------<  78  3.9   |  37<H>  |  1.03    Ca    9.1      14 Aug 2020 06:38  Mg     2.4     08-13    TPro  5.9<L>  /  Alb  3.0<L>  /  TBili  0.8  /  DBili  x   /  AST  24  /  ALT  25  /  AlkPhos  65  08-14    PT/INR - ( 12 Aug 2020 21:47 )   PT: 14.3 sec;   INR: 1.23 ratio         PTT - ( 12 Aug 2020 21:47 )  PTT:31.2 sec  CARDIAC MARKERS ( 12 Aug 2020 21:47 )  <0.015 ng/mL / x     / x     / x     / x          Blood Culture:   I&O's Summary    CAPILLARY BLOOD GLUCOSE          RADIOLOGY/EKG:

## 2020-08-14 NOTE — DISCHARGE NOTE NURSING/CASE MANAGEMENT/SOCIAL WORK - PATIENT PORTAL LINK FT
You can access the FollowMyHealth Patient Portal offered by [facility] by registering at the following website: [email or web address]. By joining [facility]’s FollowMyHealth portal, you will also be able to view your health information using other applications (apps) compatible with our system.

## 2020-08-14 NOTE — PROGRESS NOTE ADULT - ASSESSMENT
Pt is a 89y old Female with hx of Afib (Eliquis), Sever Mitral, Aortic Stenosis, CVA, CHF, HTN, Hypothyroidism presents for worsening SOB. Patient states that she's had worsening lower extremity edema bilaterally, doesn't know for how long. Patient states that power has gone out at home, and was brought to emergency room after she called 911. Patient is anxious to get home.   She does not want us talking to her son Riccardo who recently as per patient was in an accident and is very injured himself. Patient states that she lives alone and has aides for a few hours a day.     1) Acute on Chronic Diastolic Heart Failure  - c/w Lasix, Metoprolol   - Echo 2018 - 50-60%.  - F/U Echo today  - Evaluate Ambulation for Home O2  - daily weights   - cardiology consult appreciated     2) dyspnea   - monitor O2  - patient not a candidate for home O2 at this time  - refusing any medications to be given to help with symptoms    2) debility   - PT consult appreciated   - supportive care   - PPSV2: 40-50 %    3) Moderate protein calorie malnutrition   - underweight   - registered dietician consult appreciated  - encourage PO intake      4) GOC/ Advance care planning   - patient refusing son to be called to schedule GOC meeting - patient also does not have son phone number with her.    - GOC meeting had 8/13, f/u meeting 8/14 at 11AM - please refer to notes  - BEAU: DNR/I

## 2020-08-14 NOTE — DISCHARGE NOTE PROVIDER - NSDCCPCAREPLAN_GEN_ALL_CORE_FT
PRINCIPAL DISCHARGE DIAGNOSIS  Diagnosis: Acute congestive heart failure, unspecified heart failure type  Assessment and Plan of Treatment: You were diagnosed with an exacerbation of Heart failure. We gave you diuretics to help the symptoms of shortness of breath. We will discharge you back home, although we recoomend going to a rehab facility. We have scheduled an appointment at home with a new primary care physician to continue to manage your medications and continue your heatlh maintenece. Please continue to follow up with this physician.        SECONDARY DISCHARGE DIAGNOSES  Diagnosis: Hypothyroid  Assessment and Plan of Treatment: Your home medication dose of levothyoroxine was continued while in the hospital. Please follow up with your primary care physician who will be visiting you at home.    Diagnosis: Afib  Assessment and Plan of Treatment: Your medication was continued while in the hospital. please follow up with your primary care physician who will be following you at home. PRINCIPAL DISCHARGE DIAGNOSIS  Diagnosis: Acute congestive heart failure, unspecified heart failure type  Assessment and Plan of Treatment: You were diagnosed with an exacerbation of Heart failure. We gave you diuretics to help the symptoms of shortness of breath. We will discharge you back home, although we reccomend going to a rehab facility. We have scheduled an appointment at home with a new primary care physician to continue to manage your medications and continue your heatlh maintenece. Please continue to follow up with this physician.  We are sending you home with oxygen to help your breathing as you move around your house.         SECONDARY DISCHARGE DIAGNOSES  Diagnosis: Hypothyroid  Assessment and Plan of Treatment: Your home medication dose of levothyoroxine was continued while in the hospital. Please follow up with your primary care physician who will be visiting you at home.    Diagnosis: Afib  Assessment and Plan of Treatment: Your medication was continued while in the hospital. please follow up with your primary care physician who will be following you at home.

## 2020-08-14 NOTE — DISCHARGE NOTE NURSING/CASE MANAGEMENT/SOCIAL WORK - NSDCPEPTSTRK_GEN_ALL_CORE
Need for follow up after discharge/Risk factors for stroke/Stroke education booklet/Prescribed medications/Stroke warning signs and symptoms/Signs and symptoms of stroke/Call 911 for stroke/Stroke support groups for patients, families, and friends

## 2020-08-14 NOTE — CHART NOTE - NSCHARTNOTEFT_GEN_A_CORE
HOME O2 EVALUATION    Pulse Ox Room Air Rest: 93%    Pulse Ox Room Air Ambulatin%    Pulse Ox on O2  N/A        L Ambulating: N/A    Pulse Ox Post Ambulation: 87%    Based on the above evaluation the 89F with a PMH of decompensated CHF, Severe MS/AS, Valvular AF and underlying emphysema, this pt will requires Supplemental Home Oxygenation for her Chronic and Stable condition.

## 2020-08-14 NOTE — DISCHARGE NOTE PROVIDER - HOSPITAL COURSE
89 YO with a PMH of aortic and mitral stenosis, Afib, CVA, CHF, HTN, presents for worsening SOB, and LE Edema. Patient states that the power in her house has gone out on the day of admission and resulted with worsening SOB. She denies any chest pain, lightheadedness. She states she lives home alone, and hasn't seen a physician in some time. She is aware she has mitral stenosis and aortic stenosis and does not want treatement for it. She mentions that she wants home O2 and originally did not want us contacting her son, who is also currently hospitalized as well in another hospital. During her admission we provied diuretics, and discussed goals of care, with palliative medicine, It was encouraged to follow up with a rehab facility but she does not want to go.         Subjective - Patient states that she needs home oxygen to help her breathe as she was getting tired speaking. She is awaiting to be discharged to home and will be followed by a home visiting physician, and home care that she had already set up.             REVIEW OF SYSTEMS:    CONSTITUTIONAL: No weakness, fevers or chills    EYES/ENT: No visual changes;  No vertigo or throat pain     NECK: No pain or stiffness    RESPIRATORY: No cough, wheezing, hemoptysis; + Shortness of breath.     CARDIOVASCULAR: No chest pain or palpitations    GASTROINTESTINAL: No abdominal or epigastric pain. No nausea, vomiting, or hematemesis; No diarrhea or constipation. No melena or hematochezia.    GENITOURINARY: No dysuria, frequency or hematuria    NEUROLOGICAL: No numbness or weakness    SKIN: No itching, burning, rashes, or lesions     All other review of systems is negative unless indicated above        PHYSICAL EXAM:    Vital Signs Last 24 Hrs    T(C): 36.4 (13 Aug 2020 21:15), Max: 36.4 (13 Aug 2020 21:15)    T(F): 97.6 (13 Aug 2020 21:15), Max: 97.6 (13 Aug 2020 21:15)    HR: 92 (13 Aug 2020 22:28) (92 - 95)    BP: 109/59 (13 Aug 2020 22:28) (98/62 - 116/62)    BP(mean): --    RR: 17 (13 Aug 2020 22:28) (17 - 18)    SpO2: 100% (13 Aug 2020 22:28) (100% - 100%)        Constitutional: Pt lying in bed, awake and alert, NAD    HEENT: EOMI, normal hearing, moist mucous membranes    Neck: Soft and supple, no JVD    Respiratory: CTABL, No wheezing, rales or rhonchi    Cardiovascular: Systolic murmur noted in apex.     Gastrointestinal: BS+, soft, NT/ND, no guarding, no rebound    Extremities: No peripheral edema    Vascular: 2+ peripheral pulses    Neurological: AAOx3, no focal deficits    Musculoskeletal: 5/5 strength b/l upper and lower extremities    Skin: No rashes 89 YO with a PMH of aortic and mitral stenosis, Afib, CVA, CHF, HTN, presents for worsening SOB, and LE Edema. Patient states that the power in her house has gone out on the day of admission and resulted with worsening SOB. She denies any chest pain, lightheadedness. She states she lives home alone, and hasn't seen a physician in some time. She is aware she has mitral stenosis and aortic stenosis and does not want treatement for it. She mentions that she wants home O2 and originally did not want us contacting her son, who is also currently hospitalized as well in another hospital. During her admission we provied diuretics, and discussed goals of care, with palliative medicine, It was encouraged to follow up with a rehab facility but she does not want to go.         Subjective - Patient states that she needs home oxygen to help her breathe as she was getting tired speaking. She is awaiting to be discharged to home and will be followed by a home visiting physician, and home care that she had already set up.             REVIEW OF SYSTEMS:    CONSTITUTIONAL: No weakness, fevers or chills    EYES/ENT: No visual changes;  No vertigo or throat pain     NECK: No pain or stiffness    RESPIRATORY: No cough, wheezing, hemoptysis; + Shortness of breath.     CARDIOVASCULAR: No chest pain or palpitations    GASTROINTESTINAL: No abdominal or epigastric pain. No nausea, vomiting, or hematemesis; No diarrhea or constipation. No melena or hematochezia.    GENITOURINARY: No dysuria, frequency or hematuria    NEUROLOGICAL: No numbness or weakness    SKIN: No itching, burning, rashes, or lesions     All other review of systems is negative unless indicated above        PHYSICAL EXAM:    Vital Signs Last 24 Hrs    T(C): 36.4 (13 Aug 2020 21:15), Max: 36.4 (13 Aug 2020 21:15)    T(F): 97.6 (13 Aug 2020 21:15), Max: 97.6 (13 Aug 2020 21:15)    HR: 92 (13 Aug 2020 22:28) (92 - 95)    BP: 109/59 (13 Aug 2020 22:28) (98/62 - 116/62)    BP(mean): --    RR: 17 (13 Aug 2020 22:28) (17 - 18)    SpO2: 100% (13 Aug 2020 22:28) (100% - 100%)        Constitutional: Pt lying in bed, awake and alert, NAD    HEENT: EOMI, normal hearing, moist mucous membranes    Neck: Soft and supple, no JVD    Respiratory: CTABL, No wheezing, rales or rhonchi    Cardiovascular: Systolic murmur noted in apex.     Gastrointestinal: BS+, soft, NT/ND, no guarding, no rebound    Extremities: No peripheral edema    Vascular: 2+ peripheral pulses    Neurological: AAOx3, no focal deficits    Musculoskeletal: 5/5 strength b/l upper and lower extremities    Skin: No rashes                medicine attending addendum- this pleasant woamn presented with a mild decompensation of HFpEF, likely from a combination of her underlying valvualr heart disease and losing power at hime and thus no supplemental 02. while here she improved with gentle diuresis. no evisence of infection nor ischemia. she denied medical or dietary indiscretion. we suggested a VKA over the DOAC she is on given the valvular nature of her afib (MS and AS), she opted not. she expressed verbal understanding of risks. we reviewed options for further cardiac workup and interventions and she prefers not to, only current medical therapy. we suggested transition to rehab as se is quite frail, she refused and wants to return home with her aid. we asked [facility] to consult given the overall poor prognosis. she initially refused to allow us to call her son to inform him of her status but then agreed to allow us to call on the day of discharge. I have placed a call to the # provided.  total time ~35 min. 88 YO with a PMH of aortic and mitral stenosis, Afib, CVA, CHF, HTN, presents for worsening SOB, and LE Edema. Patient states that the power in her house has gone out on the day of admission and resulted with worsening SOB. She denies any chest pain, lightheadedness. She states she lives home alone, and hasn't seen a physician in some time. She is aware she has mitral stenosis and aortic stenosis and does not want treatement for it. She mentions that she  originally did not want us contacting her son, who is also currently hospitalized as well in another hospital. During her admission we provied diuretics, and discussed goals of care, with palliative medicine, It was encouraged to follow up with a rehab facility but she does not want to go.         Subjective - Patient states that she needs home oxygen to help her breathe as she was getting tired speaking. She is awaiting to be discharged to home and will be followed by a home visiting physician, and home care that she had already set up. Patient has become dyspnic on ambulation to 86% on room air and will need home O2 services.             REVIEW OF SYSTEMS:    CONSTITUTIONAL: No weakness, fevers or chills    EYES/ENT: No visual changes;  No vertigo or throat pain     NECK: No pain or stiffness    RESPIRATORY: No cough, wheezing, hemoptysis; + Shortness of breath.     CARDIOVASCULAR: No chest pain or palpitations    GASTROINTESTINAL: No abdominal or epigastric pain. No nausea, vomiting, or hematemesis; No diarrhea or constipation. No melena or hematochezia.    GENITOURINARY: No dysuria, frequency or hematuria    NEUROLOGICAL: No numbness or weakness    SKIN: No itching, burning, rashes, or lesions     All other review of systems is negative unless indicated above        PHYSICAL EXAM:    Vital Signs Last 24 Hrs    T(C): 36.4 (13 Aug 2020 21:15), Max: 36.4 (13 Aug 2020 21:15)    T(F): 97.6 (13 Aug 2020 21:15), Max: 97.6 (13 Aug 2020 21:15)    HR: 92 (13 Aug 2020 22:28) (92 - 95)    BP: 109/59 (13 Aug 2020 22:28) (98/62 - 116/62)    BP(mean): --    RR: 17 (13 Aug 2020 22:28) (17 - 18)    SpO2: 100% (13 Aug 2020 22:28) (100% - 100%)        Constitutional: Pt lying in bed, awake and alert, NAD    HEENT: EOMI, normal hearing, moist mucous membranes    Neck: Soft and supple, no JVD    Respiratory: CTABL, No wheezing, rales or rhonchi    Cardiovascular: Systolic murmur noted in apex.     Gastrointestinal: BS+, soft, NT/ND, no guarding, no rebound    Extremities: No peripheral edema    Vascular: 2+ peripheral pulses    Neurological: AAOx3, no focal deficits    Musculoskeletal: 5/5 strength b/l upper and lower extremities    Skin: No rashes                medicine attending addendum- this pleasant woamn presented with a mild decompensation of HFpEF, likely from a combination of her underlying valvualr heart disease and losing power at hime and thus no supplemental 02. while here she improved with gentle diuresis. no evisence of infection nor ischemia. she denied medical or dietary indiscretion. we suggested a VKA over the DOAC she is on given the valvular nature of her afib (MS and AS), she opted not. she expressed verbal understanding of risks. we reviewed options for further cardiac workup and interventions and she prefers not to, only current medical therapy. we suggested transition to rehab as se is quite frail, she refused and wants to return home with her aid. we asked [facility] to consult given the overall poor prognosis. she initially refused to allow us to call her son to inform him of her status but then agreed to allow us to call on the day of discharge. I have placed a call to the # provided.  total time ~35 min. 88 YO with a PMH of aortic and mitral stenosis, Afib, CVA, CHF, HTN, presents for worsening SOB, and LE Edema. Patient states that the power in her house has gone out on the day of admission and resulted with worsening SOB. She denies any chest pain, lightheadedness. She states she lives home alone, and hasn't seen a physician in some time. She is aware she has mitral stenosis and aortic stenosis and does not want treatement for it. She mentions that she  originally did not want us contacting her son, who is also currently hospitalized as well in another hospital. During her admission we provied diuretics, and discussed goals of care, with palliative medicine, It was encouraged to follow up with a rehab facility but she does not want to go.         Subjective - Patient states that she needs home oxygen to help her breathe as she was getting tired speaking. She is awaiting to be discharged to home and will be followed by a home visiting physician, and home care that she had already set up. Due to her Chronic Heart Failure state, she will require home O2 services, as she is satting 87% on room air at rest.             REVIEW OF SYSTEMS:    CONSTITUTIONAL: No weakness, fevers or chills    EYES/ENT: No visual changes;  No vertigo or throat pain     NECK: No pain or stiffness    RESPIRATORY: No cough, wheezing, hemoptysis; + Shortness of breath.     CARDIOVASCULAR: No chest pain or palpitations    GASTROINTESTINAL: No abdominal or epigastric pain. No nausea, vomiting, or hematemesis; No diarrhea or constipation. No melena or hematochezia.    GENITOURINARY: No dysuria, frequency or hematuria    NEUROLOGICAL: No numbness or weakness    SKIN: No itching, burning, rashes, or lesions     All other review of systems is negative unless indicated above        PHYSICAL EXAM:    Vital Signs Last 24 Hrs    T(C): 36.4 (13 Aug 2020 21:15), Max: 36.4 (13 Aug 2020 21:15)    T(F): 97.6 (13 Aug 2020 21:15), Max: 97.6 (13 Aug 2020 21:15)    HR: 92 (13 Aug 2020 22:28) (92 - 95)    BP: 109/59 (13 Aug 2020 22:28) (98/62 - 116/62)    BP(mean): --    RR: 17 (13 Aug 2020 22:28) (17 - 18)        Constitutional: Pt lying in bed, awake and alert, NAD    HEENT: EOMI, normal hearing, moist mucous membranes    Neck: Soft and supple, no JVD    Respiratory: CTABL, No wheezing, rales or rhonchi    Cardiovascular: Systolic murmur noted in apex.     Gastrointestinal: BS+, soft, NT/ND, no guarding, no rebound    Extremities: No peripheral edema    Vascular: 2+ peripheral pulses    Neurological: AAOx3, no focal deficits    Musculoskeletal: 5/5 strength b/l upper and lower extremities    Skin: No rashes                medicine attending addendum- this pleasant woamn presented with a mild decompensation of HFpEF, likely from a combination of her underlying valvualr heart disease and losing power at hime and thus no supplemental 02. while here she improved with gentle diuresis. no evisence of infection nor ischemia. she denied medical or dietary indiscretion. we suggested a VKA over the DOAC she is on given the valvular nature of her afib (MS and AS), she opted not. she expressed verbal understanding of risks. we reviewed options for further cardiac workup and interventions and she prefers not to, only current medical therapy. we suggested transition to rehab as se is quite frail, she refused and wants to return home with her aid. we asked [facility] to consult given the overall poor prognosis. she initially refused to allow us to call her son to inform him of her status but then agreed to allow us to call on the day of discharge. I have placed a call to the # provided.  total time ~35 min. 89 YO with a PMH of aortic and mitral stenosis, Afib, CVA, CHF, HTN, presents for worsening SOB, and LE Edema. Patient states that the power in her house has gone out on the day of admission and resulted with worsening SOB. She denies any chest pain, lightheadedness. She states she lives home alone, and hasn't seen a physician in some time. She is aware she has mitral stenosis and aortic stenosis and does not want treatement for it. She mentions that she  originally did not want us contacting her son, who is also currently hospitalized as well in another hospital. During her admission we provied diuretics, and discussed goals of care, with palliative medicine, It was encouraged to follow up with a rehab facility but she does not want to go.         Subjective - Patient states that she needs home oxygen to help her breathe as she was getting tired speaking. She is awaiting to be discharged to home and will be followed by a home visiting physician, and home care that she had already set up. Due to her Chronic Heart Failure state, she will require home O2 services, as she is satting 87% on ambulation on RA.             REVIEW OF SYSTEMS:    CONSTITUTIONAL: No weakness, fevers or chills    EYES/ENT: No visual changes;  No vertigo or throat pain     NECK: No pain or stiffness    RESPIRATORY: No cough, wheezing, hemoptysis; + Shortness of breath.     CARDIOVASCULAR: No chest pain or palpitations    GASTROINTESTINAL: No abdominal or epigastric pain. No nausea, vomiting, or hematemesis; No diarrhea or constipation. No melena or hematochezia.    GENITOURINARY: No dysuria, frequency or hematuria    NEUROLOGICAL: No numbness or weakness    SKIN: No itching, burning, rashes, or lesions     All other review of systems is negative unless indicated above        PHYSICAL EXAM:    Vital Signs Last 24 Hrs    T(C): 36.4 (13 Aug 2020 21:15), Max: 36.4 (13 Aug 2020 21:15)    T(F): 97.6 (13 Aug 2020 21:15), Max: 97.6 (13 Aug 2020 21:15)    HR: 92 (13 Aug 2020 22:28) (92 - 95)    BP: 109/59 (13 Aug 2020 22:28) (98/62 - 116/62)    BP(mean): --    RR: 17 (13 Aug 2020 22:28) (17 - 18)        Constitutional: Pt lying in bed, awake and alert, NAD    HEENT: EOMI, normal hearing, moist mucous membranes    Neck: Soft and supple, no JVD    Respiratory: CTABL, No wheezing, rales or rhonchi    Cardiovascular: Systolic murmur noted in apex.     Gastrointestinal: BS+, soft, NT/ND, no guarding, no rebound    Extremities: No peripheral edema    Vascular: 2+ peripheral pulses    Neurological: AAOx3, no focal deficits    Musculoskeletal: 5/5 strength b/l upper and lower extremities    Skin: No rashes                medicine attending addendum- this pleasant woamn presented with a mild decompensation of HFpEF, likely from a combination of her underlying valvualr heart disease and losing power at hime and thus no supplemental 02. while here she improved with gentle diuresis. no evisence of infection nor ischemia. she denied medical or dietary indiscretion. we suggested a VKA over the DOAC she is on given the valvular nature of her afib (MS and AS), she opted not. she expressed verbal understanding of risks. we reviewed options for further cardiac workup and interventions and she prefers not to, only current medical therapy. we suggested transition to rehab as se is quite frail, she refused and wants to return home with her aid. we asked [facility] to consult given the overall poor prognosis. she initially refused to allow us to call her son to inform him of her status but then agreed to allow us to call on the day of discharge. I have placed a call to the # provided.  total time ~35 min. 88 YO with a PMH of aortic and mitral stenosis, Afib, CVA, CHF, HTN, presents for worsening SOB, and LE Edema. Patient states that the power in her house has gone out on the day of admission and resulted with worsening SOB. She denies any chest pain, lightheadedness. She states she lives home alone, and hasn't seen a physician in some time. She is aware she has mitral stenosis and aortic stenosis and does not want treatement for it. She mentions that she  originally did not want us contacting her son, who is also currently hospitalized as well in another hospital. During her admission we provied diuretics, and discussed goals of care, with palliative medicine, It was encouraged to follow up with a rehab facility but she does not want to go.         Subjective - Patient states that she needs home oxygen to help her breathe as she was getting tired speaking. She is awaiting oxygen delivery and will be followed by a home visiting physician, and home care that she had already set up. Due to her Chronic Heart Failure state, she will require home O2 services, as she is satting 87% on ambulation on RA.             REVIEW OF SYSTEMS:    CONSTITUTIONAL: No weakness, fevers or chills    EYES/ENT: No visual changes;  No vertigo or throat pain     NECK: No pain or stiffness    RESPIRATORY: No cough, wheezing, hemoptysis; + Shortness of breath.     CARDIOVASCULAR: No chest pain or palpitations    GASTROINTESTINAL: No abdominal or epigastric pain. No nausea, vomiting, or hematemesis; No diarrhea or constipation. No melena or hematochezia.    GENITOURINARY: No dysuria, frequency or hematuria    NEUROLOGICAL: No numbness or weakness    SKIN: No itching, burning, rashes, or lesions     All other review of systems is negative unless indicated above        PHYSICAL EXAM:    Vital Signs Last 24 Hrs    T(C): 36.2 (14 Aug 2020 20:59), Max: 36.3 (14 Aug 2020 15:48)    T(F): 97.1 (14 Aug 2020 20:59), Max: 97.4 (14 Aug 2020 15:48)    HR: 90 (14 Aug 2020 20:59) (90 - 94)    BP: 96/38 (14 Aug 2020 20:59) (96/38 - 105/83)    BP(mean): --    RR: 20 (14 Aug 2020 20:59) (20 - 20)    SpO2: 100% (14 Aug 2020 20:59) (90% - 100%)        Constitutional: Pt lying in bed, awake and alert, NAD    HEENT: EOMI, normal hearing, moist mucous membranes    Neck: Soft and supple, no JVD    Respiratory: CTABL, No wheezing, rales or rhonchi    Cardiovascular: Systolic murmur noted in apex.     Gastrointestinal: BS+, soft, NT/ND, no guarding, no rebound    Extremities: No peripheral edema    Vascular: 2+ peripheral pulses    Neurological: AAOx3, no focal deficits    Musculoskeletal: 5/5 strength b/l upper and lower extremities    Skin: No rashes                medicine attending addendum- this pleasant woamn presented with a mild decompensation of HFpEF, likely from a combination of her underlying valvualr heart disease and losing power at hime and thus no supplemental 02. while here she improved with gentle diuresis. no evisence of infection nor ischemia. she denied medical or dietary indiscretion. we suggested a VKA over the DOAC she is on given the valvular nature of her afib (MS and AS), she opted not. she expressed verbal understanding of risks. we reviewed options for further cardiac workup and interventions and she prefers not to, only current medical therapy. we suggested transition to rehab as se is quite frail, she refused and wants to return home with her aid. we asked [facility] to consult given the overall poor prognosis. she initially refused to allow us to call her son to inform him of her status but then agreed to allow us to call on the day of discharge. I have placed a call to the # provided.  total time ~35 min. 89 YO with a PMH of aortic and mitral stenosis, Afib, CVA, CHF, HTN, presents for worsening SOB, and LE Edema. Patient states that the power in her house has gone out on the day of admission and resulted with worsening SOB. She denies any chest pain, lightheadedness. She states she lives home alone, and hasn't seen a physician in some time. She is aware she has mitral stenosis and aortic stenosis and does not want treatement for it. She mentions that she  originally did not want us contacting her son, who is also currently hospitalized as well in another hospital. During her admission we provied diuretics, and discussed goals of care, with palliative medicine, It was encouraged to follow up with a rehab facility but she does not want to go.         Subjective - Patient states that she needs home oxygen to help her breathe as she was getting tired speaking. She is awaiting oxygen delivery and will be followed by a home visiting physician, and home care that she had already set up. Due to her Chronic Heart Failure state, she will require home O2 services, as she is satting 87% on ambulation on RA.             REVIEW OF SYSTEMS:    CONSTITUTIONAL: No weakness, fevers or chills    EYES/ENT: No visual changes;  No vertigo or throat pain     NECK: No pain or stiffness    RESPIRATORY: No cough, wheezing, hemoptysis; + Shortness of breath.     CARDIOVASCULAR: No chest pain or palpitations    GASTROINTESTINAL: No abdominal or epigastric pain. No nausea, vomiting, or hematemesis; No diarrhea or constipation. No melena or hematochezia.    GENITOURINARY: No dysuria, frequency or hematuria    NEUROLOGICAL: No numbness or weakness    SKIN: No itching, burning, rashes, or lesions     All other review of systems is negative unless indicated above        Vital Signs Last 24 Hrs    T(C): 36.2 (15 Aug 2020 09:07), Max: 36.3 (14 Aug 2020 15:48)    T(F): 97.2 (15 Aug 2020 09:07), Max: 97.4 (14 Aug 2020 15:48)    HR: 104 (15 Aug 2020 09:07) (90 - 104)    BP: 110/52 (15 Aug 2020 09:07) (96/38 - 110/52)    RR: 20 (15 Aug 2020 09:07) (20 - 20)    SpO2: 100% (15 Aug 2020 09:07) (90% - 100%)        Constitutional: Pt lying in bed, awake and alert, NAD    HEENT: EOMI, normal hearing, moist mucous membranes    Neck: Soft and supple, no JVD    Respiratory: CTABL, No wheezing, rales or rhonchi    Cardiovascular: Systolic murmur noted in apex.     Gastrointestinal: BS+, soft, NT/ND, no guarding, no rebound    Extremities: No peripheral edema    Vascular: 2+ peripheral pulses    Neurological: AAOx3, no focal deficits    Musculoskeletal: 5/5 strength b/l upper and lower extremities    Skin: No rashes        < from: Xray Chest 1 View- PORTABLE-Urgent (08.12.20 @ 22:14) >            IMPRESSION:   Cardiomegaly.    RIGHT greater than LEFT pleural effusions..        < end of copied text >                        medicine attending addendum- this pleasant woamn presented with a mild decompensation of HFpEF, likely from a combination of her underlying valvualr heart disease and losing power at hime and thus no supplemental 02. while here she improved with gentle diuresis. no evisence of infection nor ischemia. she denied medical or dietary indiscretion. we suggested a VKA over the DOAC she is on given the valvular nature of her afib (MS and AS), she opted not. she expressed verbal understanding of risks. we reviewed options for further cardiac workup and interventions and she prefers not to, only current medical therapy. we suggested transition to rehab as se is quite frail, she refused and wants to return home with her aid. we asked [facility] to consult given the overall poor prognosis. she initially refused to allow us to call her son to inform him of her status but then agreed to allow us to call on the day of discharge. I have placed a call to the # provided.  total time ~35 min.

## 2020-08-14 NOTE — PROGRESS NOTE ADULT - ASSESSMENT
88 YO F with PMH of Aortic, Mitral Stenosis, AFib, CVA, CHF, HTN, is admitted with CHF exacerbation.     #Acute on Chronic Diastolic Heart Failure  - Lasix 40 BID. Monitor electrolytes  - Metoprolol 25  - Echo 2018 - 50-60%. Echo 2020 shows no difference.   - HOME O2 needed, will recieve 8/15  - D/C Planning 8/15  - Dr. Yonis Phillip, New PCP with house calls.   - GOC conversation had. DNR/DNI     #Afib  - Eliquis 2.5 BID    #Hypothyroidism  - Levothyroxine 112    #DVT PPX  - Eliquis 2.5 BID

## 2020-08-14 NOTE — DISCHARGE NOTE PROVIDER - NSDCMRMEDTOKEN_GEN_ALL_CORE_FT
Eliquis 2.5 mg oral tablet: 1 tab(s) orally 2 times a day  furosemide 40 mg oral tablet: 1 tab(s) orally once a day  Klor-Con 10 mEq oral tablet, extended release: 2 tab(s) orally 2 times a day  metoprolol tartrate 25 mg oral tablet: 1 tab(s) orally 2 times a day  Synthroid 112 mcg (0.112 mg) oral tablet: 1 tab(s) orally once a day

## 2020-08-15 VITALS
RESPIRATION RATE: 20 BRPM | SYSTOLIC BLOOD PRESSURE: 110 MMHG | OXYGEN SATURATION: 100 % | HEART RATE: 104 BPM | TEMPERATURE: 97 F | DIASTOLIC BLOOD PRESSURE: 52 MMHG

## 2020-08-15 PROCEDURE — 99239 HOSP IP/OBS DSCHRG MGMT >30: CPT

## 2020-08-15 RX ADMIN — APIXABAN 2.5 MILLIGRAM(S): 2.5 TABLET, FILM COATED ORAL at 06:26

## 2020-08-15 RX ADMIN — Medication 40 MILLIGRAM(S): at 09:10

## 2020-08-15 RX ADMIN — Medication 25 MILLIGRAM(S): at 09:10

## 2020-08-15 RX ADMIN — Medication 112 MICROGRAM(S): at 06:07

## 2020-08-15 RX ADMIN — Medication 20 MILLIEQUIVALENT(S): at 09:10

## 2020-08-15 NOTE — PROGRESS NOTE ADULT - ATTENDING COMMENTS
Patient seen and examined with Family Medicine Residents Eloy Rosales and Gary Orellana on the Family Medicine Teaching Service.  Agree with history, physical, labs and plan which were reviewed in detail after a face to face encounter with the patient.
logistical delays to DC today (home 02)  please see my addendum in the DC summary for details.    clinically improved albeit frail.
cards input appreciated.  dyspnea improved.  CVS- SM 2-3/6 @base and LLSB, no OS.      imp- ADHF, Afib, valvular basis.  plan- diurese, she does not want VKA over a DOAC as we suggested, does not want any interventions for the valvular disease. we discussed GOC and will make a pall-med/hospice referral.

## 2020-08-15 NOTE — CHART NOTE - NSCHARTNOTEFT_GEN_A_CORE
Patient's Son, Riccardo Prasad was contacted by Dr. Batista and a conversation was had regarding the patient's hospital course, and disposition. Son is aware of patient's treatment. Patient's Son, Riccardo Sandoval was contacted by Dr. Batista and a conversation was had regarding the patient's hospital course, and disposition. Son is aware of patient's treatment.

## 2020-08-15 NOTE — PROGRESS NOTE ADULT - ASSESSMENT
89 YO F with PMH of Aortic, Mitral Stenosis, AFib, CVA, CHF, HTN, is admitted with CHF exacerbation.     #Acute on Chronic Diastolic Heart Failure  - Lasix 40 BID. Monitor electrolytes  - Metoprolol 25  - Echo 2018 - 50-60%. Echo 2020 shows no difference.   - HOME O2 needed, will recieve 8/15  - D/C Planning 8/15  - Dr. Yonis Phillip, New PCP with house calls.   - GOC conversation had. DNR/DNI     #Afib  - Eliquis 2.5 BID    #Hypothyroidism  - Levothyroxine 112    #DVT PPX  - Eliquis 2.5 BID

## 2020-08-15 NOTE — PROGRESS NOTE ADULT - SUBJECTIVE AND OBJECTIVE BOX
88 YO F with PMH of Afib (Eliquis), Sever Mitral, Aortic Stenosis, CVA, CHF, HTN, Hypothyroidism presents for worsening SOB. Patient states that she's had worsening lower extremity edema bilaterally, doesn't know for how long. Patient states that power has gone out at home, and was brought to emergency room. She denies fevers, chills, cough, chest pain. She does not want us talking to her son, and states that she lives at home alone, as most of her friends and family are dead.     Subjective - Patient states that she wishes to return home, and does not want rehab care. She will be seen by a PCP with home visits and will have an aid that will receive her at home with oxygen capabilities.     Home O2 requirement: In the setting of chronic diastolic heart failure and Severe mitral and aortic valvular disease, Pt has been having shortness of breath and will require oxygen at home for her chronic conditions.       REVIEW OF SYSTEMS:  CONSTITUTIONAL: No weakness, fevers or chills  EYES/ENT: No visual changes;  No vertigo or throat pain   NECK: No pain or stiffness  RESPIRATORY: No cough, wheezing, hemoptysis; + shortness of breath when speaking  CARDIOVASCULAR: No chest pain or palpitations  GASTROINTESTINAL: No abdominal or epigastric pain. No nausea, vomiting, or hematemesis; No diarrhea or constipation. No melena or hematochezia.  GENITOURINARY: No dysuria, frequency or hematuria  NEUROLOGICAL: No numbness or weakness  SKIN: No itching, burning, rashes, or lesions   All other review of systems is negative unless indicated above    PHYSICAL EXAM:  Vital Signs Last 24 Hrs  T(C): 36.3 (14 Aug 2020 15:48), Max: 36.4 (13 Aug 2020 21:15)  T(F): 97.4 (14 Aug 2020 15:48), Max: 97.6 (13 Aug 2020 21:15)  HR: 94 (14 Aug 2020 15:48) (92 - 95)  BP: 105/83 (14 Aug 2020 15:48) (98/62 - 116/62)  BP(mean): --  RR: 20 (14 Aug 2020 15:48) (17 - 20)      Constitutional: Pt lying in bed, awake and alert, NAD  HEENT: EOMI, normal hearing, moist mucous membranes  Neck: Soft and supple, no JVD  Respiratory: CTABL, No wheezing, rales or rhonchi  Cardiovascular: Sysolic Murmur noted at [place].   Gastrointestinal: BS+, soft, NT/ND, no guarding, no rebound  Extremities: No peripheral edema  Vascular: 2+ peripheral pulses  Neurological: AAOx3, no focal deficits  Musculoskeletal: 5/5 strength b/l upper and lower extremities  Skin: No rashes    MEDICATIONS:  MEDICATIONS  (STANDING):  apixaban 2.5 milliGRAM(s) Oral every 12 hours  furosemide   Injectable 40 milliGRAM(s) IV Push two times a day  levothyroxine 112 MICROGram(s) Oral daily  metoprolol tartrate 25 milliGRAM(s) Oral two times a day  potassium chloride    Tablet ER 20 milliEquivalent(s) Oral two times a day      LABS: All Labs Reviewed:                        11.6   7.49  )-----------( 192      ( 13 Aug 2020 06:54 )             37.1     08-14    140  |  102  |  24<H>  ----------------------------<  78  3.9   |  37<H>  |  1.03    Ca    9.1      14 Aug 2020 06:38  Mg     2.4     08-13    TPro  5.9<L>  /  Alb  3.0<L>  /  TBili  0.8  /  DBili  x   /  AST  24  /  ALT  25  /  AlkPhos  65  08-14    PT/INR - ( 12 Aug 2020 21:47 )   PT: 14.3 sec;   INR: 1.23 ratio         PTT - ( 12 Aug 2020 21:47 )  PTT:31.2 sec  CARDIAC MARKERS ( 12 Aug 2020 21:47 )  <0.015 ng/mL / x     / x     / x     / x          Blood Culture:   I&O's Summary    CAPILLARY BLOOD GLUCOSE          RADIOLOGY/EKG:

## 2020-08-19 DIAGNOSIS — Z86.73 PERSONAL HISTORY OF TRANSIENT ISCHEMIC ATTACK (TIA), AND CEREBRAL INFARCTION WITHOUT RESIDUAL DEFICITS: ICD-10-CM

## 2020-08-19 DIAGNOSIS — E03.9 HYPOTHYROIDISM, UNSPECIFIED: ICD-10-CM

## 2020-08-19 DIAGNOSIS — Z51.5 ENCOUNTER FOR PALLIATIVE CARE: ICD-10-CM

## 2020-08-19 DIAGNOSIS — Z66 DO NOT RESUSCITATE: ICD-10-CM

## 2020-08-19 DIAGNOSIS — I35.0 NONRHEUMATIC AORTIC (VALVE) STENOSIS: ICD-10-CM

## 2020-08-19 DIAGNOSIS — Z79.899 OTHER LONG TERM (CURRENT) DRUG THERAPY: ICD-10-CM

## 2020-08-19 DIAGNOSIS — E44.0 MODERATE PROTEIN-CALORIE MALNUTRITION: ICD-10-CM

## 2020-08-19 DIAGNOSIS — I11.0 HYPERTENSIVE HEART DISEASE WITH HEART FAILURE: ICD-10-CM

## 2020-08-19 DIAGNOSIS — I48.21 PERMANENT ATRIAL FIBRILLATION: ICD-10-CM

## 2020-08-19 DIAGNOSIS — I50.33 ACUTE ON CHRONIC DIASTOLIC (CONGESTIVE) HEART FAILURE: ICD-10-CM

## 2020-08-19 DIAGNOSIS — Z79.01 LONG TERM (CURRENT) USE OF ANTICOAGULANTS: ICD-10-CM

## 2020-09-07 ENCOUNTER — INPATIENT (INPATIENT)
Facility: HOSPITAL | Age: 85
LOS: 6 days | Discharge: HOSPICE | DRG: 682 | End: 2020-09-14
Attending: FAMILY MEDICINE | Admitting: FAMILY MEDICINE
Payer: MEDICARE

## 2020-09-07 VITALS — WEIGHT: 104.94 LBS | SYSTOLIC BLOOD PRESSURE: 119 MMHG | DIASTOLIC BLOOD PRESSURE: 52 MMHG | HEIGHT: 61 IN

## 2020-09-07 DIAGNOSIS — I08.0 RHEUMATIC DISORDERS OF BOTH MITRAL AND AORTIC VALVES: ICD-10-CM

## 2020-09-07 DIAGNOSIS — I35.0 NONRHEUMATIC AORTIC (VALVE) STENOSIS: ICD-10-CM

## 2020-09-07 DIAGNOSIS — E87.5 HYPERKALEMIA: ICD-10-CM

## 2020-09-07 DIAGNOSIS — Z98.890 OTHER SPECIFIED POSTPROCEDURAL STATES: Chronic | ICD-10-CM

## 2020-09-07 DIAGNOSIS — I10 ESSENTIAL (PRIMARY) HYPERTENSION: ICD-10-CM

## 2020-09-07 DIAGNOSIS — I48.21 PERMANENT ATRIAL FIBRILLATION: ICD-10-CM

## 2020-09-07 PROBLEM — I63.9 CEREBRAL INFARCTION, UNSPECIFIED: Chronic | Status: ACTIVE | Noted: 2020-08-13

## 2020-09-07 PROBLEM — I48.91 UNSPECIFIED ATRIAL FIBRILLATION: Chronic | Status: ACTIVE | Noted: 2020-08-13

## 2020-09-07 PROBLEM — E03.9 HYPOTHYROIDISM, UNSPECIFIED: Chronic | Status: ACTIVE | Noted: 2020-08-13

## 2020-09-07 LAB
ANION GAP SERPL CALC-SCNC: 3 MMOL/L — LOW (ref 5–17)
ANION GAP SERPL CALC-SCNC: 5 MMOL/L — SIGNIFICANT CHANGE UP (ref 5–17)
ANION GAP SERPL CALC-SCNC: 6 MMOL/L — SIGNIFICANT CHANGE UP (ref 5–17)
APPEARANCE UR: CLEAR — SIGNIFICANT CHANGE UP
BASOPHILS # BLD AUTO: 0.02 K/UL — SIGNIFICANT CHANGE UP (ref 0–0.2)
BASOPHILS NFR BLD AUTO: 0.2 % — SIGNIFICANT CHANGE UP (ref 0–2)
BILIRUB UR-MCNC: ABNORMAL
BUN SERPL-MCNC: 49 MG/DL — HIGH (ref 7–23)
BUN SERPL-MCNC: 49 MG/DL — HIGH (ref 7–23)
BUN SERPL-MCNC: 53 MG/DL — HIGH (ref 7–23)
CALCIUM SERPL-MCNC: 9.2 MG/DL — SIGNIFICANT CHANGE UP (ref 8.5–10.1)
CALCIUM SERPL-MCNC: 9.2 MG/DL — SIGNIFICANT CHANGE UP (ref 8.5–10.1)
CALCIUM SERPL-MCNC: 9.5 MG/DL — SIGNIFICANT CHANGE UP (ref 8.5–10.1)
CHLORIDE SERPL-SCNC: 104 MMOL/L — SIGNIFICANT CHANGE UP (ref 96–108)
CHLORIDE SERPL-SCNC: 106 MMOL/L — SIGNIFICANT CHANGE UP (ref 96–108)
CHLORIDE SERPL-SCNC: 106 MMOL/L — SIGNIFICANT CHANGE UP (ref 96–108)
CK SERPL-CCNC: 72 U/L — SIGNIFICANT CHANGE UP (ref 26–192)
CO2 SERPL-SCNC: 28 MMOL/L — SIGNIFICANT CHANGE UP (ref 22–31)
CO2 SERPL-SCNC: 29 MMOL/L — SIGNIFICANT CHANGE UP (ref 22–31)
CO2 SERPL-SCNC: 29 MMOL/L — SIGNIFICANT CHANGE UP (ref 22–31)
COLOR SPEC: YELLOW — SIGNIFICANT CHANGE UP
CREAT SERPL-MCNC: 1.55 MG/DL — HIGH (ref 0.5–1.3)
CREAT SERPL-MCNC: 1.61 MG/DL — HIGH (ref 0.5–1.3)
CREAT SERPL-MCNC: 1.68 MG/DL — HIGH (ref 0.5–1.3)
DIFF PNL FLD: ABNORMAL
EOSINOPHIL # BLD AUTO: 0.01 K/UL — SIGNIFICANT CHANGE UP (ref 0–0.5)
EOSINOPHIL NFR BLD AUTO: 0.1 % — SIGNIFICANT CHANGE UP (ref 0–6)
GLUCOSE SERPL-MCNC: 101 MG/DL — HIGH (ref 70–99)
GLUCOSE SERPL-MCNC: 89 MG/DL — SIGNIFICANT CHANGE UP (ref 70–99)
GLUCOSE SERPL-MCNC: 91 MG/DL — SIGNIFICANT CHANGE UP (ref 70–99)
GLUCOSE UR QL: 250 MG/DL
HCT VFR BLD CALC: 39.5 % — SIGNIFICANT CHANGE UP (ref 34.5–45)
HGB BLD-MCNC: 12.4 G/DL — SIGNIFICANT CHANGE UP (ref 11.5–15.5)
IMM GRANULOCYTES NFR BLD AUTO: 0.5 % — SIGNIFICANT CHANGE UP (ref 0–1.5)
KETONES UR-MCNC: ABNORMAL
LEUKOCYTE ESTERASE UR-ACNC: ABNORMAL
LYMPHOCYTES # BLD AUTO: 1.43 K/UL — SIGNIFICANT CHANGE UP (ref 1–3.3)
LYMPHOCYTES # BLD AUTO: 12 % — LOW (ref 13–44)
MCHC RBC-ENTMCNC: 31.4 GM/DL — LOW (ref 32–36)
MCHC RBC-ENTMCNC: 31.6 PG — SIGNIFICANT CHANGE UP (ref 27–34)
MCV RBC AUTO: 100.5 FL — HIGH (ref 80–100)
MONOCYTES # BLD AUTO: 0.71 K/UL — SIGNIFICANT CHANGE UP (ref 0–0.9)
MONOCYTES NFR BLD AUTO: 6 % — SIGNIFICANT CHANGE UP (ref 2–14)
NEUTROPHILS # BLD AUTO: 9.65 K/UL — HIGH (ref 1.8–7.4)
NEUTROPHILS NFR BLD AUTO: 81.2 % — HIGH (ref 43–77)
NITRITE UR-MCNC: POSITIVE
PH UR: 6.5 — SIGNIFICANT CHANGE UP (ref 5–8)
PLATELET # BLD AUTO: 254 K/UL — SIGNIFICANT CHANGE UP (ref 150–400)
POTASSIUM SERPL-MCNC: 5.2 MMOL/L — SIGNIFICANT CHANGE UP (ref 3.5–5.3)
POTASSIUM SERPL-MCNC: 5.3 MMOL/L — SIGNIFICANT CHANGE UP (ref 3.5–5.3)
POTASSIUM SERPL-MCNC: 6.8 MMOL/L — CRITICAL HIGH (ref 3.5–5.3)
POTASSIUM SERPL-SCNC: 5.2 MMOL/L — SIGNIFICANT CHANGE UP (ref 3.5–5.3)
POTASSIUM SERPL-SCNC: 5.3 MMOL/L — SIGNIFICANT CHANGE UP (ref 3.5–5.3)
POTASSIUM SERPL-SCNC: 6.8 MMOL/L — CRITICAL HIGH (ref 3.5–5.3)
PROT UR-MCNC: 100 MG/DL
RBC # BLD: 3.93 M/UL — SIGNIFICANT CHANGE UP (ref 3.8–5.2)
RBC # FLD: 14.1 % — SIGNIFICANT CHANGE UP (ref 10.3–14.5)
SARS-COV-2 RNA SPEC QL NAA+PROBE: SIGNIFICANT CHANGE UP
SODIUM SERPL-SCNC: 136 MMOL/L — SIGNIFICANT CHANGE UP (ref 135–145)
SODIUM SERPL-SCNC: 140 MMOL/L — SIGNIFICANT CHANGE UP (ref 135–145)
SODIUM SERPL-SCNC: 140 MMOL/L — SIGNIFICANT CHANGE UP (ref 135–145)
SP GR SPEC: 1.01 — SIGNIFICANT CHANGE UP (ref 1.01–1.02)
TROPONIN I SERPL-MCNC: 0.06 NG/ML — HIGH (ref 0.01–0.04)
TROPONIN I SERPL-MCNC: 0.07 NG/ML — HIGH (ref 0.01–0.04)
TROPONIN I SERPL-MCNC: 0.07 NG/ML — HIGH (ref 0.01–0.04)
UROBILINOGEN FLD QL: 12 MG/DL
WBC # BLD: 11.88 K/UL — HIGH (ref 3.8–10.5)
WBC # FLD AUTO: 11.88 K/UL — HIGH (ref 3.8–10.5)

## 2020-09-07 PROCEDURE — 84132 ASSAY OF SERUM POTASSIUM: CPT

## 2020-09-07 PROCEDURE — 81001 URINALYSIS AUTO W/SCOPE: CPT

## 2020-09-07 PROCEDURE — 36415 COLL VENOUS BLD VENIPUNCTURE: CPT

## 2020-09-07 PROCEDURE — 80048 BASIC METABOLIC PNL TOTAL CA: CPT

## 2020-09-07 PROCEDURE — 84443 ASSAY THYROID STIM HORMONE: CPT

## 2020-09-07 PROCEDURE — 83880 ASSAY OF NATRIURETIC PEPTIDE: CPT

## 2020-09-07 PROCEDURE — 84484 ASSAY OF TROPONIN QUANT: CPT

## 2020-09-07 PROCEDURE — U0003: CPT

## 2020-09-07 PROCEDURE — 86769 SARS-COV-2 COVID-19 ANTIBODY: CPT

## 2020-09-07 PROCEDURE — 36600 WITHDRAWAL OF ARTERIAL BLOOD: CPT

## 2020-09-07 PROCEDURE — 97116 GAIT TRAINING THERAPY: CPT | Mod: GP

## 2020-09-07 PROCEDURE — 82803 BLOOD GASES ANY COMBINATION: CPT

## 2020-09-07 PROCEDURE — 71045 X-RAY EXAM CHEST 1 VIEW: CPT

## 2020-09-07 PROCEDURE — 71250 CT THORAX DX C-: CPT

## 2020-09-07 PROCEDURE — 82607 VITAMIN B-12: CPT

## 2020-09-07 PROCEDURE — 97162 PT EVAL MOD COMPLEX 30 MIN: CPT | Mod: GP

## 2020-09-07 PROCEDURE — 85610 PROTHROMBIN TIME: CPT

## 2020-09-07 PROCEDURE — 82040 ASSAY OF SERUM ALBUMIN: CPT

## 2020-09-07 PROCEDURE — 93005 ELECTROCARDIOGRAM TRACING: CPT

## 2020-09-07 PROCEDURE — 85027 COMPLETE CBC AUTOMATED: CPT

## 2020-09-07 PROCEDURE — 82746 ASSAY OF FOLIC ACID SERUM: CPT

## 2020-09-07 PROCEDURE — 80053 COMPREHEN METABOLIC PANEL: CPT

## 2020-09-07 PROCEDURE — 82962 GLUCOSE BLOOD TEST: CPT

## 2020-09-07 PROCEDURE — 99223 1ST HOSP IP/OBS HIGH 75: CPT

## 2020-09-07 PROCEDURE — 87086 URINE CULTURE/COLONY COUNT: CPT

## 2020-09-07 RX ORDER — FUROSEMIDE 40 MG
20 TABLET ORAL DAILY
Refills: 0 | Status: DISCONTINUED | OUTPATIENT
Start: 2020-09-07 | End: 2020-09-12

## 2020-09-07 RX ORDER — SODIUM CHLORIDE 9 MG/ML
1000 INJECTION INTRAMUSCULAR; INTRAVENOUS; SUBCUTANEOUS ONCE
Refills: 0 | Status: COMPLETED | OUTPATIENT
Start: 2020-09-07 | End: 2020-09-07

## 2020-09-07 RX ORDER — APIXABAN 2.5 MG/1
2.5 TABLET, FILM COATED ORAL
Refills: 0 | Status: DISCONTINUED | OUTPATIENT
Start: 2020-09-07 | End: 2020-09-10

## 2020-09-07 RX ORDER — LEVOTHYROXINE SODIUM 125 MCG
112 TABLET ORAL DAILY
Refills: 0 | Status: DISCONTINUED | OUTPATIENT
Start: 2020-09-07 | End: 2020-09-09

## 2020-09-07 RX ORDER — ONDANSETRON 8 MG/1
4 TABLET, FILM COATED ORAL EVERY 6 HOURS
Refills: 0 | Status: DISCONTINUED | OUTPATIENT
Start: 2020-09-07 | End: 2020-09-14

## 2020-09-07 RX ORDER — SODIUM POLYSTYRENE SULFONATE 4.1 MEQ/G
30 POWDER, FOR SUSPENSION ORAL ONCE
Refills: 0 | Status: COMPLETED | OUTPATIENT
Start: 2020-09-07 | End: 2020-09-07

## 2020-09-07 RX ORDER — METOPROLOL TARTRATE 50 MG
25 TABLET ORAL
Refills: 0 | Status: DISCONTINUED | OUTPATIENT
Start: 2020-09-07 | End: 2020-09-14

## 2020-09-07 RX ORDER — DEXTROSE 50 % IN WATER 50 %
50 SYRINGE (ML) INTRAVENOUS ONCE
Refills: 0 | Status: COMPLETED | OUTPATIENT
Start: 2020-09-07 | End: 2020-09-07

## 2020-09-07 RX ORDER — ALBUTEROL 90 UG/1
2 AEROSOL, METERED ORAL ONCE
Refills: 0 | Status: COMPLETED | OUTPATIENT
Start: 2020-09-07 | End: 2020-09-07

## 2020-09-07 RX ORDER — INFLUENZA VIRUS VACCINE 15; 15; 15; 15 UG/.5ML; UG/.5ML; UG/.5ML; UG/.5ML
0.5 SUSPENSION INTRAMUSCULAR ONCE
Refills: 0 | Status: DISCONTINUED | OUTPATIENT
Start: 2020-09-07 | End: 2020-09-14

## 2020-09-07 RX ORDER — ACETAMINOPHEN 500 MG
650 TABLET ORAL EVERY 6 HOURS
Refills: 0 | Status: DISCONTINUED | OUTPATIENT
Start: 2020-09-07 | End: 2020-09-14

## 2020-09-07 RX ORDER — INSULIN HUMAN 100 [IU]/ML
4 INJECTION, SOLUTION SUBCUTANEOUS ONCE
Refills: 0 | Status: COMPLETED | OUTPATIENT
Start: 2020-09-07 | End: 2020-09-07

## 2020-09-07 RX ADMIN — SODIUM CHLORIDE 1000 MILLILITER(S): 9 INJECTION INTRAMUSCULAR; INTRAVENOUS; SUBCUTANEOUS at 07:38

## 2020-09-07 RX ADMIN — ALBUTEROL 2 PUFF(S): 90 AEROSOL, METERED ORAL at 07:34

## 2020-09-07 RX ADMIN — APIXABAN 2.5 MILLIGRAM(S): 2.5 TABLET, FILM COATED ORAL at 11:17

## 2020-09-07 RX ADMIN — Medication 50 MILLILITER(S): at 07:35

## 2020-09-07 RX ADMIN — SODIUM CHLORIDE 1000 MILLILITER(S): 9 INJECTION INTRAMUSCULAR; INTRAVENOUS; SUBCUTANEOUS at 08:45

## 2020-09-07 RX ADMIN — Medication 25 MILLIGRAM(S): at 11:17

## 2020-09-07 RX ADMIN — INSULIN HUMAN 4 UNIT(S): 100 INJECTION, SOLUTION SUBCUTANEOUS at 07:41

## 2020-09-07 RX ADMIN — SODIUM POLYSTYRENE SULFONATE 30 GRAM(S): 4.1 POWDER, FOR SUSPENSION ORAL at 07:37

## 2020-09-07 NOTE — ED ADULT TRIAGE NOTE - CHIEF COMPLAINT QUOTE
pt presents to the ED s/p fall, per EMS pt was on ground for multiple hours before being found, pt is on Eliquis, pt denies pain, Trauma alert called

## 2020-09-07 NOTE — CONSULT NOTE ADULT - PROBLEM SELECTOR RECOMMENDATION 5
As above. Pt will discuss with us and her son. If she declines surgical evaluation then would suggest Palliative/Hospice evaluation. She regrets being admitted to the hospital.

## 2020-09-07 NOTE — ED ADULT NURSE REASSESSMENT NOTE - NS ED NURSE REASSESS COMMENT FT1
Pt A&O x3, recvd from night RN in bed with rails up, cardiac monitor in place. Trauma alert cleared and pt medicated as ordered. Pt cleaned and repositioned for comfort, call bell within reach.

## 2020-09-07 NOTE — CONSULT NOTE ADULT - SUBJECTIVE AND OBJECTIVE BOX
PCP:    REQUESTING PHYSICIAN:    REASON FOR CONSULT:    CHIEF COMPLAINT:    HPI:  CC:  Patient is a 89y old  Female who presents with a chief complaint of fall.    HPI:  89F.  admitted 09/07/20.  presented from home to ED via EMS.  c/o fall.  reports she, "tripped over [her] own too feet."  denied LOC.  she has very poor recollection of events.  states she was laying on the floor for several hours before EMS arrived.      recently, patient was hospitalized at  from 08/13-08/15 due to mild exacerbation of HFpEF.  among her DC medications were potassium 20mEq po bid and Lasix 40mg po qd.    in ED, potassium was found to be elevated (6.8).  R insulin 4 units, CLARA MDI and Kayexalate were administered.  CPK was wnl.  furthermore, TnI was mildly elevated (0.063).  patient refusing further imaging (CXR as well as b/l hip and pelvis xray).  Cardiology was requested to evaluate symptoms. Pt has a history of severe AS and MS for which she is followed by [facility]. She has not seen her cardiologist for a period of time. She was evaluated 3 years ago for valvular intervention but a future course was undecided. She reports that she was relatively asymptomatic at that time. H/O atrial fibrillation, CVA, HTN, scarlet fever as a child.     ROS:    (-) pyuria.    all other review of systems are negative unless indicated above.    PAST MEDICAL & SURGICAL HISTORY:  Afib  CVA (cerebral vascular accident)  Hypothyroidism  Cerebrovascular accident (CVA)  Atrial fibrillation  Essential hypertension  Chronic multifocal osteomyelitis, other site  Scarlet fever  S/P debridement      Allergies    No Known Allergies    Intolerances        Home Medications:  Eliquis 2.5 mg oral tablet: 1 tab(s) orally 2 times a day (13 Aug 2020 02:46)  furosemide 40 mg oral tablet: 1 tab(s) orally once a day (13 Aug 2020 02:46)  Klor-Con 10 mEq oral tablet, extended release: 2 tab(s) orally 2 times a day (13 Aug 2020 02:46)  metoprolol tartrate 25 mg oral tablet: 1 tab(s) orally 2 times a day (13 Aug 2020 02:46)  Synthroid 112 mcg (0.112 mg) oral tablet: 1 tab(s) orally once a day (13 Aug 2020 02:46)      Social History:  lives at home and is assisted by a HHA.    FAMILY HISTORY:  Family history of heart disease (Sibling)      Vital Signs Last 24 Hrs  T(C): 36.4 (07 Sep 2020 05:51), Max: 36.4 (07 Sep 2020 05:51)  T(F): 97.6 (07 Sep 2020 05:51), Max: 97.6 (07 Sep 2020 05:51)  HR: 92 (07 Sep 2020 07:48) (92 - 100)  BP: 105/62 (07 Sep 2020 07:48) (105/62 - 119/52)  BP(mean): --  RR: 21 (07 Sep 2020 07:48) (17 - 21)  SpO2: 100% (07 Sep 2020 07:48) (98% - 100%)    Constitutional: NAD.   HEENT: PERRL, EOMI, MMM.  Neck: Soft and supple, No carotid bruit, No JVD  Respiratory: Breath sounds are clear bilaterally, No wheezing, rales or rhonchi  Cardiovascular: S1 and S2, irregular, 2/6 TANK right sternal border, 1/6 dm apex  Gastrointestinal: Bowel Sounds present, soft, nontender, nondistended, no guarding, no rebound, no mass.  Extremities: (+) LE edema  Vascular: 2+ peripheral pulses  Neurological: A/O x 3, no focal deficits  Musculoskeletal: 5/5 strength b/l upper and lower extremities  Skin:  no visible rashes.                     EKG (preliminary):  AF.  inferolateral abnormalities appear grossly unchanged as compared to 08/13/20 tracing.    < from: TTE Echo Complete w/o Contrast w/ Doppler (08.13.20 @ 09:52) >  Impression     Summary     Severe aortic stenosis is present.   At least moderate (2+) eccentric aortic regurgitation is present.   The POLLY by continuity equation is .47 cm2   The dimensionless index is .15   The left atrium is severely dilated.   Mild concentric left ventricular hypertrophy is present.   Estimated left ventricular ejection fraction is 50 %.   The IVC is dilated.   Severe mitral stenosis is present.   Severe mitral annular calcification is present.   Severe (4+) eccentric mitral regurgitation is present.   No evidence of pericardial effusion.   Pleural effusion - right pleural effusion.   Mild to moderate pulmonic valvular regurgitation is present.   The right atrium appears moderately dilated.   The right ventricle is not always well visualized, appears grossly normal   in size.   The tricuspid valve leaflets appear mildly thickened and/or calcified, but   open well.   Mild to Moderate Tricuspid regurgitation is present.   Moderate pulmonary hypertension.     Signature     ----------------------------------------------------------------   Electronically signed by Diamond Lewis MD(Interpreting   physician) on 08/13/2020 06:33 PM    < end of copied text > (07 Sep 2020 10:01)          LABS: All Labs Reviewed:                        12.4   11.88 )-----------( 254      ( 07 Sep 2020 06:25 )             39.5     07 Sep 2020 13:05    140    |  106    |  49     ----------------------------<  91     5.3     |  28     |  1.61   07 Sep 2020 06:25    136    |  104    |  53     ----------------------------<  89     6.8     |  29     |  1.68     Ca    9.2        07 Sep 2020 13:05  Ca    9.5        07 Sep 2020 06:25        CARDIAC MARKERS ( 07 Sep 2020 13:05 )  0.071 ng/mL / x     / x     / x     / x      CARDIAC MARKERS ( 07 Sep 2020 10:30 )  0.066 ng/mL / x     / x     / x     / x      CARDIAC MARKERS ( 07 Sep 2020 06:25 )  0.063 ng/mL / x     / 72 U/L / x     / x          Blood Culture:         RADIOLOGY/EKG:

## 2020-09-07 NOTE — ED ADULT TRIAGE NOTE - STATUS:
Spironolactone Counseling: Patient advised regarding risks of diarrhea, abdominal pain, hyperkalemia, birth defects (for female patients), liver toxicity and renal toxicity. The patient may need blood work to monitor liver and kidney function and potassium levels while on therapy. The patient verbalized understanding of the proper use and possible adverse effects of spironolactone.  All of the patient's questions and concerns were addressed. Birth Control Pills Counseling: Birth Control Pill Counseling: I discussed with the patient the potential side effects of OCPs including but not limited to increased risk of stroke, heart attack, thrombophlebitis, deep venous thrombosis, hepatic adenomas, breast changes, GI upset, headaches, and depression.  The patient verbalized understanding of the proper use and possible adverse effects of OCPs. All of the patient's questions and concerns were addressed. Isotretinoin Counseling: Patient should get monthly blood tests, not donate blood, not drive at night if vision affected, not share medication, and not undergo elective surgery for 6 months after tx completed. Side effects reviewed, pt to contact office should one occur. Applied Benzoyl Peroxide Counseling: Patient counseled that medicine may cause skin irritation and bleach clothing.  In the event of skin irritation, the patient was advised to reduce the amount of the drug applied or use it less frequently.   The patient verbalized understanding of the proper use and possible adverse effects of benzoyl peroxide.  All of the patient's questions and concerns were addressed. Tetracycline Counseling: Patient counseled regarding possible photosensitivity and increased risk for sunburn.  Patient instructed to avoid sunlight, if possible.  When exposed to sunlight, patients should wear protective clothing, sunglasses, and sunscreen.  The patient was instructed to call the office immediately if the following severe adverse effects occur:  hearing changes, easy bruising/bleeding, severe headache, or vision changes.  The patient verbalized understanding of the proper use and possible adverse effects of tetracycline.  All of the patient's questions and concerns were addressed. Patient understands to avoid pregnancy while on therapy due to potential birth defects. Spironolactone Pregnancy And Lactation Text: This medication can cause feminization of the male fetus and should be avoided during pregnancy. The active metabolite is also found in breast milk. Erythromycin Counseling:  I discussed with the patient the risks of erythromycin including but not limited to GI upset, allergic reaction, drug rash, diarrhea, increase in liver enzymes, and yeast infections. Bactrim Pregnancy And Lactation Text: This medication is Pregnancy Category D and is known to cause fetal risk.  It is also excreted in breast milk. Topical Sulfur Applications Counseling: Topical Sulfur Counseling: Patient counseled that this medication may cause skin irritation or allergic reactions.  In the event of skin irritation, the patient was advised to reduce the amount of the drug applied or use it less frequently.   The patient verbalized understanding of the proper use and possible adverse effects of topical sulfur application.  All of the patient's questions and concerns were addressed. Include Pregnancy/Lactation Warning?: No Azithromycin Pregnancy And Lactation Text: This medication is considered safe during pregnancy and is also secreted in breast milk. Doxycycline Pregnancy And Lactation Text: This medication is Pregnancy Category D and not consider safe during pregnancy. It is also excreted in breast milk but is considered safe for shorter treatment courses. Topical Clindamycin Counseling: Patient counseled that this medication may cause skin irritation or allergic reactions.  In the event of skin irritation, the patient was advised to reduce the amount of the drug applied or use it less frequently.   The patient verbalized understanding of the proper use and possible adverse effects of clindamycin.  All of the patient's questions and concerns were addressed. Minocycline Counseling: Patient advised regarding possible photosensitivity and discoloration of the teeth, skin, lips, tongue and gums.  Patient instructed to avoid sunlight, if possible.  When exposed to sunlight, patients should wear protective clothing, sunglasses, and sunscreen.  The patient was instructed to call the office immediately if the following severe adverse effects occur:  hearing changes, easy bruising/bleeding, severe headache, or vision changes.  The patient verbalized understanding of the proper use and possible adverse effects of minocycline.  All of the patient's questions and concerns were addressed. Detail Level: Zone Topical Retinoid counseling:  Patient advised to apply a pea-sized amount only at bedtime and wait 30 minutes after washing their face before applying.  If too drying, patient may add a non-comedogenic moisturizer. The patient verbalized understanding of the proper use and possible adverse effects of retinoids.  All of the patient's questions and concerns were addressed. Minocycline Pregnancy And Lactation Text: This medication is Pregnancy Category D and not consider safe during pregnancy. It is also excreted in breast milk. Topical Sulfur Applications Pregnancy And Lactation Text: This medication is Pregnancy Category C and has an unknown safety profile during pregnancy. It is unknown if this topical medication is excreted in breast milk. Topical Retinoid Pregnancy And Lactation Text: This medication is Pregnancy Category C. It is unknown if this medication is excreted in breast milk. Benzoyl Peroxide Pregnancy And Lactation Text: This medication is Pregnancy Category C. It is unknown if benzoyl peroxide is excreted in breast milk. Azithromycin Counseling:  I discussed with the patient the risks of azithromycin including but not limited to GI upset, allergic reaction, drug rash, diarrhea, and yeast infections. Erythromycin Pregnancy And Lactation Text: This medication is Pregnancy Category B and is considered safe during pregnancy. It is also excreted in breast milk. Tazorac Counseling:  Patient advised that medication is irritating and drying.  Patient may need to apply sparingly and wash off after an hour before eventually leaving it on overnight.  The patient verbalized understanding of the proper use and possible adverse effects of tazorac.  All of the patient's questions and concerns were addressed. Tazorac Pregnancy And Lactation Text: This medication is not safe during pregnancy. It is unknown if this medication is excreted in breast milk. Bactrim Counseling:  I discussed with the patient the risks of sulfa antibiotics including but not limited to GI upset, allergic reaction, drug rash, diarrhea, dizziness, photosensitivity, and yeast infections.  Rarely, more serious reactions can occur including but not limited to aplastic anemia, agranulocytosis, methemoglobinemia, blood dyscrasias, liver or kidney failure, lung infiltrates or desquamative/blistering drug rashes. High Dose Vitamin A Counseling: Side effects reviewed, pt to contact office should one occur. High Dose Vitamin A Pregnancy And Lactation Text: High dose vitamin A therapy is contraindicated during pregnancy and breast feeding. Topical Clindamycin Pregnancy And Lactation Text: This medication is Pregnancy Category B and is considered safe during pregnancy. It is unknown if it is excreted in breast milk. Birth Control Pills Pregnancy And Lactation Text: This medication should be avoided if pregnant and for the first 30 days post-partum. Doxycycline Counseling:  Patient counseled regarding possible photosensitivity and increased risk for sunburn.  Patient instructed to avoid sunlight, if possible.  When exposed to sunlight, patients should wear protective clothing, sunglasses, and sunscreen.  The patient was instructed to call the office immediately if the following severe adverse effects occur:  hearing changes, easy bruising/bleeding, severe headache, or vision changes.  The patient verbalized understanding of the proper use and possible adverse effects of doxycycline.  All of the patient's questions and concerns were addressed. Dapsone Pregnancy And Lactation Text: This medication is Pregnancy Category C and is not considered safe during pregnancy or breast feeding. Isotretinoin Pregnancy And Lactation Text: This medication is Pregnancy Category X and is considered extremely dangerous during pregnancy. It is unknown if it is excreted in breast milk. Dapsone Counseling: I discussed with the patient the risks of dapsone including but not limited to hemolytic anemia, agranulocytosis, rashes, methemoglobinemia, kidney failure, peripheral neuropathy, headaches, GI upset, and liver toxicity.  Patients who start dapsone require monitoring including baseline LFTs and weekly CBCs for the first month, then every month thereafter.  The patient verbalized understanding of the proper use and possible adverse effects of dapsone.  All of the patient's questions and concerns were addressed. Detail Level: Simple Detail Level: Generalized Detail Level: Detailed

## 2020-09-07 NOTE — ED PROVIDER NOTE - PMH
Afib    Atrial fibrillation    Cerebrovascular accident (CVA)    Chronic multifocal osteomyelitis, other site    CVA (cerebral vascular accident)    Essential hypertension    Hypothyroidism    Scarlet fever

## 2020-09-07 NOTE — CONSULT NOTE ADULT - PROBLEM SELECTOR RECOMMENDATION 4
Previous Echo in 8/20 suggest severe valvulopathy. Pt may not be a surgical candidate. There are limited non invasive therpeutic options

## 2020-09-07 NOTE — ED PROVIDER NOTE - CLINICAL SUMMARY MEDICAL DECISION MAKING FREE TEXT BOX
89 y/o F with multiple comorbidities BIBEMS s/p fall.  Plan: CT head to r/o ICH, EKG, labs to eval for infectious/metabolic pathology and admit

## 2020-09-07 NOTE — H&P ADULT - HISTORY OF PRESENT ILLNESS
CC:  Patient is a 89y old  Female who presents with a chief complaint of fall.    HPI:  89F.  admitted 09/07/20.  presented from home to ED via EMS.  c/o fall.  reports she, "tripped over [her] own too feet."  denied LOC.  she has very poor recollection of events.  states she was laying on the floor for several hours before EMS arrived.      recently, patient was hospitalized at  from 08/13-08/15 due to mild exacerbation of HFpEF.  among her DC medications were potassium 20mEq po bid and Lasix 40mg po qd.    in ED, potassium was found to be elevated (6.8).  R insulin 4 units, CLARA MDI and Kayexalate were administered.  CPK was wnl.  furthermore, TnI was mildly elevated (0.063).  patient refusing further imaging (CXR as well as b/l hip and pelvis xray).      ROS:    (-) pyuria.    all other review of systems are negative unless indicated above.    PAST MEDICAL & SURGICAL HISTORY:  Afib  CVA (cerebral vascular accident)  Hypothyroidism  Cerebrovascular accident (CVA)  Atrial fibrillation  Essential hypertension  Chronic multifocal osteomyelitis, other site  Scarlet fever  S/P debridement      Allergies    No Known Allergies    Intolerances        Home Medications:  Eliquis 2.5 mg oral tablet: 1 tab(s) orally 2 times a day (13 Aug 2020 02:46)  furosemide 40 mg oral tablet: 1 tab(s) orally once a day (13 Aug 2020 02:46)  Klor-Con 10 mEq oral tablet, extended release: 2 tab(s) orally 2 times a day (13 Aug 2020 02:46)  metoprolol tartrate 25 mg oral tablet: 1 tab(s) orally 2 times a day (13 Aug 2020 02:46)  Synthroid 112 mcg (0.112 mg) oral tablet: 1 tab(s) orally once a day (13 Aug 2020 02:46)      Social History:  lives at home and is assisted by a HHA.    FAMILY HISTORY:  Family history of heart disease (Sibling)      Vital Signs Last 24 Hrs  T(C): 36.4 (07 Sep 2020 05:51), Max: 36.4 (07 Sep 2020 05:51)  T(F): 97.6 (07 Sep 2020 05:51), Max: 97.6 (07 Sep 2020 05:51)  HR: 92 (07 Sep 2020 07:48) (92 - 100)  BP: 105/62 (07 Sep 2020 07:48) (105/62 - 119/52)  BP(mean): --  RR: 21 (07 Sep 2020 07:48) (17 - 21)  SpO2: 100% (07 Sep 2020 07:48) (98% - 100%)    Constitutional: NAD.   HEENT: PERRL, EOMI, MMM.  Neck: Soft and supple, No carotid bruit, No JVD  Respiratory: Breath sounds are clear bilaterally, No wheezing, rales or rhonchi  Cardiovascular: S1 and S2, regular rate and rhythm, no murmur, rub or gallop.  Gastrointestinal: Bowel Sounds present, soft, nontender, nondistended, no guarding, no rebound, no mass.  Extremities: (+) LE edema  Vascular: 2+ peripheral pulses  Neurological: A/O x 3, no focal deficits  Musculoskeletal: 5/5 strength b/l upper and lower extremities  Skin:  no visible rashes.                             12.4   11.88 )-----------( 254      ( 07 Sep 2020 06:25 )             39.5           09-07    136  |  104  |  53<H>  ----------------------------<  89  6.8<HH>   |  29  |  1.68<H>    Ca    9.5      07 Sep 2020 06:25            CARDIAC MARKERS ( 07 Sep 2020 06:25 )  0.063 ng/mL / x     / 72 U/L / x     / x        EKG (preliminary):  AF.  inferolateral abnormalities appear grossly unchanged as compared to 08/13/20 tracing.    < from: TTE Echo Complete w/o Contrast w/ Doppler (08.13.20 @ 09:52) >  Impression     Summary     Severe aortic stenosis is present.   At least moderate (2+) eccentric aortic regurgitation is present.   The POLLY by continuity equation is .47 cm2   The dimensionless index is .15   The left atrium is severely dilated.   Mild concentric left ventricular hypertrophy is present.   Estimated left ventricular ejection fraction is 50 %.   The IVC is dilated.   Severe mitral stenosis is present.   Severe mitral annular calcification is present.   Severe (4+) eccentric mitral regurgitation is present.   No evidence of pericardial effusion.   Pleural effusion - right pleural effusion.   Mild to moderate pulmonic valvular regurgitation is present.   The right atrium appears moderately dilated.   The right ventricle is not always well visualized, appears grossly normal   in size.   The tricuspid valve leaflets appear mildly thickened and/or calcified, but   open well.   Mild to Moderate Tricuspid regurgitation is present.   Moderate pulmonary hypertension.     Signature     ----------------------------------------------------------------   Electronically signed by Diamond Lewis MD(Interpreting   physician) on 08/13/2020 06:33 PM    < end of copied text >

## 2020-09-07 NOTE — CONSULT NOTE ADULT - PROBLEM SELECTOR RECOMMENDATION 9
Improved. Continue medical management and monitoring of electrolyes. Careful titration of KCL. Pt may have reduced diuretic dose at home because of constipation. Consider modifying opt diuresis

## 2020-09-07 NOTE — ED PROVIDER NOTE - OBJECTIVE STATEMENT
88 yo female with PMH of leoChristina mathur on eliquis, h/o CVA, CHF, HTN, hypothyroidism, h/o scarlet fever as child presents to ED via EMS s/p fall.  Pt states that she is always unsteady due to her right leg being shorter as a result of prior osteomyelitis and tripped over her walker.  She denies head injury.  She was laying on the floor for several hours before EMS was notified.  Pt denies HA, CP, SOB.

## 2020-09-07 NOTE — ED PROVIDER NOTE - CARDIAC, MLM
Normal rate, regular rhythm.  Heart sounds S1, S2.  No murmurs, rubs or gallops.  BLE 2 + pitting edema

## 2020-09-07 NOTE — ED ADULT NURSE NOTE - OBJECTIVE STATEMENT
pt states she tripped and fell while going to bathroom with her walker, on eliquies- denies hitting head or loc; c/o left elbow pain; RLE shorter than LLE due to osteomyelitis

## 2020-09-07 NOTE — H&P ADULT - ASSESSMENT
89F.  admitted 09/07/20.  presented to ED after a purported mechanical fall.    fall, r/o syncope;  dysrhythmia;  severe AS.  hyperkalemia/LULA.  likely iatrogenic.  equivocal TnI.  no chest pain.  EKG changes appear stable relative to prior tracing.  asymptomatic pyuria.  afebrile.  WBC 12K.  UCx ordered by ED.  hx HFpEF EF 50%.  08/12 CXR R>L pleural effusion.  hx AF (apixaban).  hx hypothyroidism.    -admit to the telemetry ramirez.  -follow up BMP @ 1300H and 2100H.  -renal US, r/o obstruction.  -DC supplemental potassium.  -decrease Lasix 20mg po qd.  -c/w apixaban.  -physical therapy.  -Cardiology and Nephrology consults.    advanced care planning.  -DNR/DNI.  -MOLST completed, signed, witnessed and placed in patient's ED chart.  -> 35 minutes.

## 2020-09-07 NOTE — ED CLERICAL - NS ED CLERK NOTE PRE-ARRIVAL INFORMATION; ADDITIONAL PRE-ARRIVAL INFORMATION
This patient is enrolled in the readmission program and has active care navigation. This patient can be followed up by the care navigation team within 24 hours. To arrange close follow-up or to obtain additional clinical information about this patient, please call the contact number above.   Please call the hospitalist as needed to collaborate on further medical management ([phone])

## 2020-09-07 NOTE — ED PROVIDER NOTE - CARE PLAN
Principal Discharge DX:	Hyperkalemia  Secondary Diagnosis:	LULA (acute kidney injury)  Secondary Diagnosis:	Dehydration

## 2020-09-08 DIAGNOSIS — E87.5 HYPERKALEMIA: ICD-10-CM

## 2020-09-08 DIAGNOSIS — I50.32 CHRONIC DIASTOLIC (CONGESTIVE) HEART FAILURE: ICD-10-CM

## 2020-09-08 LAB
ANION GAP SERPL CALC-SCNC: 6 MMOL/L — SIGNIFICANT CHANGE UP (ref 5–17)
BUN SERPL-MCNC: 47 MG/DL — HIGH (ref 7–23)
CALCIUM SERPL-MCNC: 9 MG/DL — SIGNIFICANT CHANGE UP (ref 8.5–10.1)
CHLORIDE SERPL-SCNC: 105 MMOL/L — SIGNIFICANT CHANGE UP (ref 96–108)
CO2 SERPL-SCNC: 28 MMOL/L — SIGNIFICANT CHANGE UP (ref 22–31)
CREAT SERPL-MCNC: 1.35 MG/DL — HIGH (ref 0.5–1.3)
CULTURE RESULTS: SIGNIFICANT CHANGE UP
FOLATE SERPL-MCNC: 10.4 NG/ML — SIGNIFICANT CHANGE UP
GLUCOSE SERPL-MCNC: 74 MG/DL — SIGNIFICANT CHANGE UP (ref 70–99)
HCT VFR BLD CALC: 37.9 % — SIGNIFICANT CHANGE UP (ref 34.5–45)
HGB BLD-MCNC: 11.4 G/DL — LOW (ref 11.5–15.5)
MCHC RBC-ENTMCNC: 30.1 GM/DL — LOW (ref 32–36)
MCHC RBC-ENTMCNC: 31.1 PG — SIGNIFICANT CHANGE UP (ref 27–34)
MCV RBC AUTO: 103.6 FL — HIGH (ref 80–100)
PLATELET # BLD AUTO: 219 K/UL — SIGNIFICANT CHANGE UP (ref 150–400)
POTASSIUM SERPL-MCNC: 4.2 MMOL/L — SIGNIFICANT CHANGE UP (ref 3.5–5.3)
POTASSIUM SERPL-SCNC: 4.2 MMOL/L — SIGNIFICANT CHANGE UP (ref 3.5–5.3)
RBC # BLD: 3.66 M/UL — LOW (ref 3.8–5.2)
RBC # FLD: 14.5 % — SIGNIFICANT CHANGE UP (ref 10.3–14.5)
SARS-COV-2 IGG SERPL QL IA: NEGATIVE — SIGNIFICANT CHANGE UP
SARS-COV-2 IGM SERPL IA-ACNC: 0.09 INDEX — SIGNIFICANT CHANGE UP
SODIUM SERPL-SCNC: 139 MMOL/L — SIGNIFICANT CHANGE UP (ref 135–145)
SPECIMEN SOURCE: SIGNIFICANT CHANGE UP
TSH SERPL-MCNC: 0.09 UU/ML — LOW (ref 0.34–4.82)
VIT B12 SERPL-MCNC: 1178 PG/ML — SIGNIFICANT CHANGE UP (ref 232–1245)
WBC # BLD: 10.95 K/UL — HIGH (ref 3.8–10.5)
WBC # FLD AUTO: 10.95 K/UL — HIGH (ref 3.8–10.5)

## 2020-09-08 PROCEDURE — 93010 ELECTROCARDIOGRAM REPORT: CPT

## 2020-09-08 PROCEDURE — 99233 SBSQ HOSP IP/OBS HIGH 50: CPT

## 2020-09-08 RX ADMIN — Medication 25 MILLIGRAM(S): at 10:48

## 2020-09-08 RX ADMIN — Medication 25 MILLIGRAM(S): at 21:53

## 2020-09-08 RX ADMIN — Medication 112 MICROGRAM(S): at 05:45

## 2020-09-08 RX ADMIN — APIXABAN 2.5 MILLIGRAM(S): 2.5 TABLET, FILM COATED ORAL at 21:53

## 2020-09-08 RX ADMIN — Medication 20 MILLIGRAM(S): at 10:48

## 2020-09-08 RX ADMIN — APIXABAN 2.5 MILLIGRAM(S): 2.5 TABLET, FILM COATED ORAL at 10:48

## 2020-09-08 NOTE — PHYSICAL THERAPY INITIAL EVALUATION ADULT - DIAGNOSIS, PT EVAL
fall, severe AS/MS, valvular heart disease (h/o Scarlet Fever as a child age 2 years) h/o multifocal OM including R knee & L elbow s/p multiple surgeries ,chronic R hip dislocation with Leg Length Discrepancy,

## 2020-09-08 NOTE — PHYSICAL THERAPY INITIAL EVALUATION ADULT - CRITERIA FOR SKILLED THERAPEUTIC INTERVENTIONS
impairments found/rehab potential/therapy frequency/predicted duration of therapy intervention/anticipated equipment needs at discharge/risk reduction/prevention/anticipated discharge recommendation/functional limitations in following categories

## 2020-09-08 NOTE — PHYSICAL THERAPY INITIAL EVALUATION ADULT - GENERAL OBSERVATIONS, REHAB EVAL
recumbent in bed with HM,O2 via NC 5L/min,dressed in long sleeve PJ top and button down sweatshirt,has 2 pairs of winter gloves

## 2020-09-08 NOTE — PHYSICAL THERAPY INITIAL EVALUATION ADULT - IMPAIRMENTS FOUND, PT EVAL
aerobic capacity/endurance/fine motor/ventilation and respiration/gas exchange/joint integrity and mobility/ROM/muscle strength

## 2020-09-08 NOTE — PROGRESS NOTE ADULT - SUBJECTIVE AND OBJECTIVE BOX
CC: shortness of breath        HPI: 89 y.o female with   PMH of monique mathur on eliquis, h/o CVA, CHF, Severe AS and MR  HTN, hypothyroidism, presented from home to ED via EMS s/p fall. Pt   reports she  "tripped over [her] own two feet."  , she was walking to the bathroom. Denied LOC, denies CP or dizziness. Unable to obtain more details.  She was laying on the floor for several hours before EMS arrived.    Patient was hospitalized at  from -08/15 due to mild exacerbation of HFpEF.  among her DC medications were potassium 20mEq po bid and Lasix 40mg po qd.  in ED, potassium was found to be elevated (6.8).  Tx with insulin 4 units, CLARA MDI and Kayexalate were administered.  CPK was wnl.  , TnI was mildly elevated (0.063).  patient refused  further imaging (CXR as well as b/l hip and pelvis xray).    Had 1L of IVF       INTERVAL HPI/ OVERNIGHT EVENTS: Chart reviewed,  Pt was seen and  examined, reports that has some SOB and needed O2 almost all the time. Poor historian     Vital Signs Last 24 Hrs  T(C): 36.7 (08 Sep 2020 17:34), Max: 36.7 (08 Sep 2020 17:34)  T(F): 98.1 (08 Sep 2020 17:34), Max: 98.1 (08 Sep 2020 17:34)  HR: 99 (08 Sep 2020 17:34) (99 - 104)  BP: 127/72 (08 Sep 2020 17:34) (98/78 - 127/72)  RR: 18 (08 Sep 2020 17:34) (17 - 18)  SpO2: 97% (08 Sep 2020 17:34) (97% - 100%)        REVIEW OF SYSTEMS:  All other review of systems is negative unless indicated above.      PHYSICAL EXAM:    General: Well developed; malnourished, frail elderly female, mildly dyspneic, on NC   Eyes: PERRLA, EOMI; conjunctiva and sclera clear  Head: Normocephalic; atraumatic  ENMT: No nasal discharge; airway clear  Neck: Supple; non tender; no masses  Respiratory: Diminished BS at bases b/l.  B/l mild rales   Cardiovascular: Irregular rate and rhythm. S1 and S2 Normal; +  murmur  Gastrointestinal: Soft non-tender non-distended; Normal bowel sounds  Genitourinary: No  suprapubic  tenderness  Extremities: + le edema, venous stasis changes   Neurological: Alert and oriented x4, non focal   Skin: Warm and dry. No acute rash  Lymph Nodes: No acute cervical adenopathy  Musculoskeletal: Normal muscle tone and strength      LABS:   CARDIAC MARKERS ( 07 Sep 2020 13:05 )  0.071 ng/mL / x     / x     / x     / x      CARDIAC MARKERS ( 07 Sep 2020 10:30 )  0.066 ng/mL / x     / x     / x     / x      CARDIAC MARKERS ( 07 Sep 2020 06:25 )  0.063 ng/mL / x     / 72 U/L / x     / x                                11.4   10.95 )-----------( 219      ( 08 Sep 2020 07:36 )             37.9     08 Sep 2020 07:36    139    |  105    |  47     ----------------------------<  74     4.2     |  28     |  1.35     Ca    9.0        08 Sep 2020 07:36      Urinalysis Basic - ( 07 Sep 2020 08:29 )  Color: Yellow / Appearance: Clear / S.010 / pH: x  Gluc: x / Ketone: Trace  / Bili: Moderate / Urobili: 12 mg/dL   Blood: x / Protein: 100 mg/dL / Nitrite: Positive   Leuk Esterase: Small / RBC: 3-5 /HPF / WBC 6-10   Sq Epi: x / Non Sq Epi: Few / Bacteria: Moderate        MEDICATIONS  (STANDING):  apixaban 2.5 milliGRAM(s) Oral two times a day  furosemide    Tablet 20 milliGRAM(s) Oral daily  influenza   Vaccine 0.5 milliLiter(s) IntraMuscular once  levothyroxine 112 MICROGram(s) Oral daily  metoprolol tartrate 25 milliGRAM(s) Oral two times a day    MEDICATIONS  (PRN):  acetaminophen   Tablet .. 650 milliGRAM(s) Oral every 6 hours PRN Mild Pain (1 - 3)  ondansetron Injectable 4 milliGRAM(s) IV Push every 6 hours PRN Nausea and/or Vomiting      RADIOLOGY & ADDITIONAL TESTS:    < from: TTE Echo Complete w/o Contrast w/ Doppler (20 @ 09:52) >  Impression     Summary     Severe aortic stenosis is present.   At least moderate (2+) eccentric aortic regurgitation is present.   The POLLY by continuity equation is .47 cm2   The dimensionless index is .15   The left atrium is severely dilated.   Mild concentric left ventricular hypertrophy is present.   Estimated left ventricular ejection fraction is 50 %.   The IVC is dilated.   Severe mitral stenosis is present.   Severe mitral annular calcification is present.   Severe (4+) eccentric mitral regurgitation is present.   No evidence of pericardial effusion.   Pleural effusion - right pleural effusion.   Mild to moderate pulmonic valvular regurgitation is present.   The right atrium appears moderately dilated.   The right ventricle is not always well visualized, appears grossly normal   in size.   The tricuspid valve leaflets appear mildly thickened and/or calcified, but   open well.   Mild to Moderate Tricuspid regurgitation is present.   Moderate pulmonary hypertension.      < from: US Kidney and Bladder (20 @ 11:17) >  EXAM:  US KIDNEYS AND BLADDER                            PROCEDURE DATE:  2020          INTERPRETATION:  CLINICAL INFORMATION: Acute kidney injury.    COMPARISON: None available.    TECHNIQUE: Sonography of the kidneys and bladder.    FINDINGS:    Right kidney:  7.9 x 3.8 x 4.0 cm. No hydronephrosis. Increased renal cortical echogenicity.    Left kidney:  7.5 x 4.0 x 4.3 cm. No hydronephrosis. Increased renal cortical echogenicity.    Urinary bladder: Within normal limits.    IMPRESSION:    No hydronephrosis.    Bilateral increased renal cortical echogenicity, consistent with renal parenchymal disease.

## 2020-09-08 NOTE — PROGRESS NOTE ADULT - SUBJECTIVE AND OBJECTIVE BOX
CHIEF COMPLAINT: fall     HPI:  CC:  Patient is a 89y old  Female who presents with a chief complaint of fall.    HPI:  89F.  admitted 09/07/20.  presented from home to ED via EMS.  c/o fall.  reports she, "tripped over [her] own too feet."  denied LOC.  she has very poor recollection of events.  states she was laying on the floor for several hours before EMS arrived.      recently, patient was hospitalized at  from 08/13-08/15 due to mild exacerbation of HFpEF.  among her DC medications were potassium 20mEq po bid and Lasix 40mg po qd.    in ED, potassium was found to be elevated (6.8).  R insulin 4 units, CLARA MDI and Kayexalate were administered.  CPK was wnl.  furthermore, TnI was mildly elevated (0.063).  patient refusing further imaging (CXR as well as b/l hip and pelvis xray).  Cardiology was requested to evaluate symptoms. Pt has a history of severe AS and MS for which she is followed by [facility]. She has not seen her cardiologist for a period of time. She was evaluated 3 years ago for valvular intervention but a future course was undecided. She reports that she was relatively asymptomatic at that time. H/O atrial fibrillation, CVA, HTN, scarlet fever as a child.       9/8/20 Patient says she tripped and fell , does have shortness of breath on minimal activity uses 4 L NC oxygen , patient does have chronic Hip dislocation     PAST MEDICAL & SURGICAL HISTORY:  Afib  CVA (cerebral vascular accident)  Hypothyroidism  Cerebrovascular accident (CVA)  Atrial fibrillation  Essential hypertension  Chronic multifocal osteomyelitis, other site  Scarlet fever  S/P debridement      Allergies    No Known Allergies    Intolerances        Home Medications:  Eliquis 2.5 mg oral tablet: 1 tab(s) orally 2 times a day (13 Aug 2020 02:46)  furosemide 40 mg oral tablet: 1 tab(s) orally once a day (13 Aug 2020 02:46)  Klor-Con 10 mEq oral tablet, extended release: 2 tab(s) orally 2 times a day (13 Aug 2020 02:46)  metoprolol tartrate 25 mg oral tablet: 1 tab(s) orally 2 times a day (13 Aug 2020 02:46)  Synthroid 112 mcg (0.112 mg) oral tablet: 1 tab(s) orally once a day (13 Aug 2020 02:46)      Social History:  lives at home and is assisted by a HHA.    FAMILY HISTORY:  Family history of heart disease (Sibling)      Vital Signs Last 24 Hrs  T(C): 36.4 (07 Sep 2020 05:51), Max: 36.4 (07 Sep 2020 05:51)  T(F): 97.6 (07 Sep 2020 05:51), Max: 97.6 (07 Sep 2020 05:51)  HR: 92 (07 Sep 2020 07:48) (92 - 100)  BP: 105/62 (07 Sep 2020 07:48) (105/62 - 119/52)  BP(mean): --  RR: 21 (07 Sep 2020 07:48) (17 - 21)  SpO2: 100% (07 Sep 2020 07:48) (98% - 100%)    Constitutional: NAD.   HEENT: PERRL, EOMI, MMM.   fragile looking   Neck: Soft and supple, No carotid bruit, No JVD  Respiratory: Breath sounds are clear bilaterally, No wheezing, rales or rhonchi  Cardiovascular: S1 and S2, irregular, 2/6 TANK right sternal border, 1/6 dm apex  Gastrointestinal: Bowel Sounds present, soft, nontender, nondistended, no guarding, no rebound, no mass.  Extremities: (+) LE edema  Vascular: 2+ peripheral pulses  Neurological: A/O x 3, no focal deficits  Musculoskeletal: 5/5 strength b/l upper and lower extremities  right LE shorter than left LE   Skin:  no visible rashes.                     EKG (preliminary):  AF.  inferolateral abnormalities appear grossly unchanged as compared to 08/13/20 tracing.    < from: 12 Lead ECG (09.07.20 @ 06:15) >  Atrial fibrillation  ST & T wave abnormality, consider inferolateral ischemia  Prolonged QT  Abnormal ECG  Confirmed by JERI LIRIANO MD ([id number]) on 9/7/2020 9:02:50 PM    < end of copied text >    < from: TTE Echo Complete w/o Contrast w/ Doppler (08.13.20 @ 09:52) >  Impression     Summary     Severe aortic stenosis is present.   At least moderate (2+) eccentric aortic regurgitation is present.   The POLLY by continuity equation is .47 cm2   The dimensionless index is .15   The left atrium is severely dilated.   Mild concentric left ventricular hypertrophy is present.   Estimated left ventricular ejection fraction is 50 %.   The IVC is dilated.   Severe mitral stenosis is present.   Severe mitral annular calcification is present.   Severe (4+) eccentric mitral regurgitation is present.   No evidence of pericardial effusion.   Pleural effusion - right pleural effusion.   Mild to moderate pulmonic valvular regurgitation is present.   The right atrium appears moderately dilated.   The right ventricle is not always well visualized, appears grossly normal   in size.   The tricuspid valve leaflets appear mildly thickened and/or calcified, but   open well.   Mild to Moderate Tricuspid regurgitation is present.   Moderate pulmonary hypertension.     Signature     ----------------------------------------------------------------   Electronically signed by Diamond Lewis MD(Interpreting   physician) on 08/13/2020 06:33 PM    < end of copied text > (07 Sep 2020 10:01)          LABS: All Labs Reviewed:                        12.4   11.88 )-----------( 254      ( 07 Sep 2020 06:25 )             39.5     07 Sep 2020 13:05    140    |  106    |  49     ----------------------------<  91     5.3     |  28     |  1.61   07 Sep 2020 06:25    136    |  104    |  53     ----------------------------<  89     6.8     |  29     |  1.68     Ca    9.2        07 Sep 2020 13:05  Ca    9.5        07 Sep 2020 06:25        CARDIAC MARKERS ( 07 Sep 2020 13:05 )  0.071 ng/mL / x     / x     / x     / x      CARDIAC MARKERS ( 07 Sep 2020 10:30 )  0.066 ng/mL / x     / x     / x     / x      CARDIAC MARKERS ( 07 Sep 2020 06:25 )  0.063 ng/mL / x     / 72 U/L / x     / x          Blood Culture:         RADIOLOGY/EKG:  < from: 12 Lead ECG (09.07.20 @ 06:15) >  Atrial fibrillation  ST & T wave abnormality, consider inferolateral ischemia  Prolonged QT  Abnormal ECG  Confirmed by JERI LIRIANO MD ([id number]) on 9/7/2020 9:02:50 PM    < end of copied text >      Monitor   atrial fibrillation , with rate        < from: Xray Chest 1 View- PORTABLE-Urgent (08.12.20 @ 22:14) >  FINDINGS:  The lungs show new RIGHT greater than LEFT bibasilar pleural effusions and/or RIGHT basilar airspace consolidation. Upper lobes clear.    The  heart is enlarged in transverse diameter. No hilar mass. Trachea midline.  There is a heavily calcified mitral annulus.    < end of copied text >

## 2020-09-08 NOTE — PHYSICAL THERAPY INITIAL EVALUATION ADULT - PLANNED THERAPY INTERVENTIONS, PT EVAL
strengthening/deep breathing,O2 conservation/balance training/bed mobility training/transfer training/ROM/gait training

## 2020-09-08 NOTE — PHYSICAL THERAPY INITIAL EVALUATION ADULT - FOLLOWS COMMANDS/ANSWERS QUESTIONS, REHAB EVAL
75% of the time/pt likes to do things her pown way and talks almost continuously but often out of breath, hand to chest to catch her breath

## 2020-09-08 NOTE — PHYSICAL THERAPY INITIAL EVALUATION ADULT - RANGE OF MOTION EXAMINATION, REHAB EVAL
bilateral upper extremity ROM was WFL (within functional limits)/bilateral lower extremity ROM was WFL (within functional limits)/mild limitation of L elbow EXT with chronic joint hypertrophy ,,feet postured in equinovarus R >L,arthritic changes B hands

## 2020-09-08 NOTE — PHYSICAL THERAPY INITIAL EVALUATION ADULT - ADDITIONAL COMMENTS
Last hospitalization pt had a L CVA with R sided weakness ,+thrombus L MCA at the M2 bifurcation but was deemed not a candidate for neurovascular intervention due to age, comorbidities etc ,she was also diagnosed with L ICA stenosis and was to be evaluated as OP for CEA

## 2020-09-08 NOTE — PROGRESS NOTE ADULT - PROBLEM SELECTOR PLAN 2
clinically class III , due to underlying severe valvular disease , apparently  not a candidate for intervention  , consider hospice care clinically class III , due to underlying severe valvular disease , apparently  not a candidate for intervention    minimal elevated troponin possible due to demand ischemia , consider hospice care

## 2020-09-08 NOTE — PHYSICAL THERAPY INITIAL EVALUATION ADULT - DISCHARGE DISPOSITION, PT EVAL
home w/ assist/pt had HHAs in place thru [facility] but probably requires increased hours/rehabilitation facility/home w/ home PT

## 2020-09-08 NOTE — PROGRESS NOTE ADULT - ASSESSMENT
89 y.o female with   PMH of a. fib on eliquis, h/o CVA, CHF, Severe AS and MR  HTN, hypothyroidism admitted for      1. S/p FAll, likely mechanical   No visible signs of trauma, but Pt refused Xrays   Did not  comply with PT eval, but was able to get up for orthostatics check   Orthostatics neg   Fall precautions   C/w tele: Paced, underline AFIB         2.  Hyperkalemia with LULA/CKD stage II   S/p Insulin, albuterol, Kayexalate  K improved this am  Renal Fx improving , s/p 1L IVF Bolus   Avoid nephrotoxic meds   Renal sono: no obstruction   labs in am     3. Dyspnea, H/p Chronic diastolic CHF 2/2 valvular Dz, has severe MR and AS   Pulse ox stable with 2l O2  No CXR to evaluate for possible CHF, will attempt in am   Check BNP  C/w lasix PO 20mg for now  weight daily   Last ECHO: EF 50%         4. Elevated troponin   No chest pain,   ECG: AFIB with ST and T wave changes, prolonged QT, suspect related to electrolyte abnormality   repeat EKG in am   Start  ASA, c/w metoprolol  CArdio eval appreciated, no further work up at this time       5. Abnormal UA  UCX: contamination   Pt is asymptomatic   Will monitor       6. Severe MR and AS  Pt was evaluated in the past by Dr José for possible Sx, but declined   Monitor closely   Palliative eval recommended by cardio     7. Hypothyroidism   with low TSH   On Synthroid 25mg  will repeat labs in am     8.  Chronic AFIB, s/p PPM   C/w A/c on low dose eliquis   C/w metoprolol     9. DVT PPx: eliquis        Pt is DNR/DNI

## 2020-09-08 NOTE — PHYSICAL THERAPY INITIAL EVALUATION ADULT - PATIENT PROFILE REVIEW, REHAB EVAL
yes/pt with chronic R hip dislocation and leg length discrepancy R <<L but does not use a shoe lift/walks on R forefoot only ;

## 2020-09-08 NOTE — PHYSICAL THERAPY INITIAL EVALUATION ADULT - STANDING BALANCE: STATIC
2PA ,limited standing tolerance on this encounter,c/o dizziness,generalized weakness,SOB/fair balance

## 2020-09-08 NOTE — PHYSICAL THERAPY INITIAL EVALUATION ADULT - LEVEL OF INDEPENDENCE, REHAB EVAL
c/o difficulty reaching for bed controls/SR with dominant R hand, with h/o mild R hemiparesis s/p L CVA (basal ganglia, corona radiata)

## 2020-09-08 NOTE — PHYSICAL THERAPY INITIAL EVALUATION ADULT - PERTINENT HX OF CURRENT PROBLEM, REHAB EVAL
pt stood out of bed during the night and states she "tripped over her own 2 feet" ,remained on floor several hours ,wanted to regain her strength to attempt to pull self up but was unable

## 2020-09-09 LAB
ADD ON TEST-SPECIMEN IN LAB: SIGNIFICANT CHANGE UP
ANION GAP SERPL CALC-SCNC: 5 MMOL/L — SIGNIFICANT CHANGE UP (ref 5–17)
BUN SERPL-MCNC: 46 MG/DL — HIGH (ref 7–23)
CALCIUM SERPL-MCNC: 9.1 MG/DL — SIGNIFICANT CHANGE UP (ref 8.5–10.1)
CHLORIDE SERPL-SCNC: 103 MMOL/L — SIGNIFICANT CHANGE UP (ref 96–108)
CO2 SERPL-SCNC: 29 MMOL/L — SIGNIFICANT CHANGE UP (ref 22–31)
CREAT SERPL-MCNC: 1.2 MG/DL — SIGNIFICANT CHANGE UP (ref 0.5–1.3)
GLUCOSE SERPL-MCNC: 76 MG/DL — SIGNIFICANT CHANGE UP (ref 70–99)
HCT VFR BLD CALC: 40.4 % — SIGNIFICANT CHANGE UP (ref 34.5–45)
HGB BLD-MCNC: 12.4 G/DL — SIGNIFICANT CHANGE UP (ref 11.5–15.5)
MCHC RBC-ENTMCNC: 30.7 GM/DL — LOW (ref 32–36)
MCHC RBC-ENTMCNC: 31.8 PG — SIGNIFICANT CHANGE UP (ref 27–34)
MCV RBC AUTO: 103.6 FL — HIGH (ref 80–100)
NT-PROBNP SERPL-SCNC: HIGH PG/ML (ref 0–450)
PLATELET # BLD AUTO: 233 K/UL — SIGNIFICANT CHANGE UP (ref 150–400)
POTASSIUM SERPL-MCNC: 4.3 MMOL/L — SIGNIFICANT CHANGE UP (ref 3.5–5.3)
POTASSIUM SERPL-SCNC: 4.3 MMOL/L — SIGNIFICANT CHANGE UP (ref 3.5–5.3)
RBC # BLD: 3.9 M/UL — SIGNIFICANT CHANGE UP (ref 3.8–5.2)
RBC # FLD: 14.5 % — SIGNIFICANT CHANGE UP (ref 10.3–14.5)
SODIUM SERPL-SCNC: 137 MMOL/L — SIGNIFICANT CHANGE UP (ref 135–145)
TSH SERPL-MCNC: 0.09 UU/ML — LOW (ref 0.34–4.82)
WBC # BLD: 10.46 K/UL — SIGNIFICANT CHANGE UP (ref 3.8–10.5)
WBC # FLD AUTO: 10.46 K/UL — SIGNIFICANT CHANGE UP (ref 3.8–10.5)

## 2020-09-09 PROCEDURE — 99498 ADVNCD CARE PLAN ADDL 30 MIN: CPT

## 2020-09-09 PROCEDURE — 71045 X-RAY EXAM CHEST 1 VIEW: CPT | Mod: 26

## 2020-09-09 PROCEDURE — 99223 1ST HOSP IP/OBS HIGH 75: CPT | Mod: 25

## 2020-09-09 PROCEDURE — 99233 SBSQ HOSP IP/OBS HIGH 50: CPT

## 2020-09-09 PROCEDURE — 99497 ADVNCD CARE PLAN 30 MIN: CPT

## 2020-09-09 RX ORDER — MORPHINE SULFATE 50 MG/1
2.5 CAPSULE, EXTENDED RELEASE ORAL AT BEDTIME
Refills: 0 | Status: DISCONTINUED | OUTPATIENT
Start: 2020-09-09 | End: 2020-09-10

## 2020-09-09 RX ORDER — LEVOTHYROXINE SODIUM 125 MCG
100 TABLET ORAL DAILY
Refills: 0 | Status: DISCONTINUED | OUTPATIENT
Start: 2020-09-09 | End: 2020-09-14

## 2020-09-09 RX ORDER — FUROSEMIDE 40 MG
20 TABLET ORAL ONCE
Refills: 0 | Status: COMPLETED | OUTPATIENT
Start: 2020-09-09 | End: 2020-09-09

## 2020-09-09 RX ORDER — MORPHINE SULFATE 50 MG/1
1 CAPSULE, EXTENDED RELEASE ORAL EVERY 6 HOURS
Refills: 0 | Status: DISCONTINUED | OUTPATIENT
Start: 2020-09-09 | End: 2020-09-10

## 2020-09-09 RX ADMIN — Medication 20 MILLIGRAM(S): at 11:30

## 2020-09-09 RX ADMIN — MORPHINE SULFATE 2.5 MILLIGRAM(S): 50 CAPSULE, EXTENDED RELEASE ORAL at 23:11

## 2020-09-09 RX ADMIN — APIXABAN 2.5 MILLIGRAM(S): 2.5 TABLET, FILM COATED ORAL at 20:57

## 2020-09-09 RX ADMIN — Medication 25 MILLIGRAM(S): at 20:58

## 2020-09-09 RX ADMIN — Medication 112 MICROGRAM(S): at 08:00

## 2020-09-09 RX ADMIN — Medication 25 MILLIGRAM(S): at 11:30

## 2020-09-09 RX ADMIN — APIXABAN 2.5 MILLIGRAM(S): 2.5 TABLET, FILM COATED ORAL at 11:30

## 2020-09-09 NOTE — CONSULT NOTE ADULT - CONVERSATION DETAILS
Met with Ms. Sandoval in follow up to our team's previous [facility] meeting 20. Pt notes not much has changed since then. She still lives at home alone, confirming that she still has aids 7d/week from 10 am- 6am, though she does not like them much. She shares that most of her siblings and her  have all  in the past 2 years. When explaining the role of our team (which she recalled) including attending to her wishes and including family in this process, she denied interest in us including her son (her only living relative right now). She explained that her son was just in a major MVA, breaking many bones, and having to close his law practice. She notes that he told her that he does not want to be called, dealing with his own issues. Thus, she wanted to go on with the conversation without him, certainly having the mental fortitude to do so.     Ms. 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 She was able to reach back into her childhood history to note her current valvular problems started back then with Scarlet fever. She went on to explain that more recently she was told she would need a TAVR but she held off on this, admitting that she missed her opportunity. She added her knowledge that this is causing her CHF, that it is indeed chronic and progressive, and that she knows she is no longer a candidate for valvular repair. Offered that subspecialists, including cardio, have also added concern that as a result of the above her prognosis is poor and she is likely to return to the hospital often with exacerbations. Furthermore, also noted that suggestion was made that she consider whether or not that type of lifestyle is acceptable to her, vs. focusing on comfort. She was aware of this and recalled discussing this with our team on last admission.     The pt often shared stories about the people she has lost recently, agreeing that all of that loss tends to take a toll on the spirit, and sometimes is an impetus to look at one's own mortality. She denied having time to really think deeply on this. However, she did confirm previously made and upheld decisions on MOLST form to be DNR and DNI, knowing that she would not want to be artificially maintained. She spoke about her  and how he  from lymphoma. In discussing this we established the importance of having a plan in the setting of a chronic progressive illness like her 's and more importantly, like hers. She noted being a practical woman, who avoided anxiety or depression because she is a planner. Used this as a jumping point to broach her desires concerning this given her acceptance and ability to describe her condition and poor prognosis. We simplified options to 1) Palliative plan- essentially continuing with current reaction to expected exacerbations/complications, including rehospitalization despite knowledge that these visits are not curative, but certainly disruptive to her life (as she contends that she never wanted to be here, quoting law that states she has a right to not be hospitalized); vs. 2) Hospice- focusing on comfort for whatever time her body dictates is her true prognosis. Spent time reviewing all benefits of both options and noting that both are covered by Medicare. She recalled discussing hospice last meeting, meandering often when asking for clear decisions, but eventually surmising that she is not ready for this yet. In fact, she noted that both she and her  shared the perspective that until hospice allows for assisted suicide with medications (though denying suicidal ideation, rather noting interest due to fear of prolonged suffering) she would not be interested. Again denying depression or intent to hurt herself. She instead, would like home palliative plan, though unsure if she wants PT added. Noted intent to share this with her team.     Ultimately, pt was open to possibly trying morphine at night for her sx (though this seems shaky as she endorsed denying many  tests and meds here thus far), remains against hospice for now, open to home with pall when ready for dc. Pt has full understanding of her disease process and prognosis, very particular and demanding, seems lonely though denies this as she is reluctant to let anyone leave the room when talking to her. She was open to us returning tomorrow to check in on sx. Above shared with KVNG Monzon and RENETTA Goodrich.

## 2020-09-09 NOTE — PROGRESS NOTE ADULT - SUBJECTIVE AND OBJECTIVE BOX
CC: shortness of breath      HPI: 89 y.o female with   PMH of monique mathur on eliquis, h/o CVA, CHF, Severe AS and MR  HTN, hypothyroidism, presented from home to ED via EMS s/p fall. Pt   reports she  "tripped over [her] own two feet."  , she was walking to the bathroom. Denied LOC, denies CP or dizziness. Unable to obtain more details.  She was laying on the floor for several hours before EMS arrived.    Patient was hospitalized at  from -08/15 due to mild exacerbation of HFpEF.  among her DC medications were potassium 20mEq po bid and Lasix 40mg po qd.  in ED, potassium was found to be elevated (6.8).  Tx with insulin 4 units, CLARA MDI and Kayexalate were administered.  CPK was wnl.  , TnI was mildly elevated (0.063).  patient refused  further imaging (CXR as well as b/l hip and pelvis xray).    Had 1L of IVF       INTERVAL HPI/ OVERNIGHT EVENTS:  Pt was seen and  examined, awake and alert, but c/o difficulty breathing and feeling fatigued. Plan discussed Pt does want to feel better and agrees with Morphine       Vital Signs Last 24 Hrs  T(C): 36.7 (09 Sep 2020 10:48), Max: 36.7 (08 Sep 2020 17:34)  T(F): 98.1 (09 Sep 2020 10:48), Max: 98.1 (08 Sep 2020 17:34)  HR: 92 (09 Sep 2020 10:48) (92 - 99)  BP: 110/78 (09 Sep 2020 10:48) (110/78 - 127/72)  RR: 20 (08 Sep 2020 22:05) (18 - 20)  SpO2: 96% (09 Sep 2020 10:48) (96% - 97%)      REVIEW OF SYSTEMS:  All other review of systems is negative unless indicated above.      PHYSICAL EXAM:  General: Well developed; malnourished, frail elderly female,  dyspneic, on NC   Eyes: PERRLA, EOMI; conjunctiva and sclera clear  Head: Normocephalic; atraumatic  ENMT: No nasal discharge; airway clear  Neck: Supple; non tender; no masses  Respiratory: Diminished BS at bases b/l.  B/l   rales   Cardiovascular: Irregular rate and rhythm. S1 and S2 Normal; +  murmur  Gastrointestinal: Soft non-tender non-distended; Normal bowel sounds  Genitourinary: No  suprapubic  tenderness  Extremities: + le edema, venous stasis changes   Neurological: Alert and oriented x3, non focal   Skin: Warm and dry. No acute rash  Lymph Nodes: No acute cervical adenopathy  Musculoskeletal: Normal muscle tone and strength      LABS:                         12.4   10.46 )-----------( 233      ( 09 Sep 2020 08:02 )             40.4         137  |  103  |  46<H>  ----------------------------<  76  4.3   |  29  |  1.20    Ca    9.1      09 Sep 2020 08:02      CARDIAC MARKERS ( 07 Sep 2020 13:05 )  0.071 ng/mL / x     / x     / x     / x      CARDIAC MARKERS ( 07 Sep 2020 10:30 )  0.066 ng/mL / x     / x     / x     / x      CARDIAC MARKERS ( 07 Sep 2020 06:25 )  0.063 ng/mL / x     / 72 U/L / x     / x                                11.4   10.95 )-----------( 219      ( 08 Sep 2020 07:36 )             37.9     08 Sep 2020 07:36    139    |  105    |  47     ----------------------------<  74     4.2     |  28     |  1.35     Ca    9.0        08 Sep 2020 07:36      Urinalysis Basic - ( 07 Sep 2020 08:29 )  Color: Yellow / Appearance: Clear / S.010 / pH: x  Gluc: x / Ketone: Trace  / Bili: Moderate / Urobili: 12 mg/dL   Blood: x / Protein: 100 mg/dL / Nitrite: Positive   Leuk Esterase: Small / RBC: 3-5 /HPF / WBC 6-10   Sq Epi: x / Non Sq Epi: Few / Bacteria: Moderate    Culture - Urine (20 @ 08:29)    Specimen Source: .Urine None    Culture Results:   >=3 organisms. Probable collection contamination.        MEDICATIONS  (STANDING):  apixaban 2.5 milliGRAM(s) Oral two times a day  furosemide    Tablet 20 milliGRAM(s) Oral daily  influenza   Vaccine 0.5 milliLiter(s) IntraMuscular once  levothyroxine 112 MICROGram(s) Oral daily  metoprolol tartrate 25 milliGRAM(s) Oral two times a day    MEDICATIONS  (PRN):  acetaminophen   Tablet .. 650 milliGRAM(s) Oral every 6 hours PRN Mild Pain (1 - 3)  ondansetron Injectable 4 milliGRAM(s) IV Push every 6 hours PRN Nausea and/or Vomiting      RADIOLOGY & ADDITIONAL TESTS:        Impression     Summary     Severe aortic stenosis is present.   At least moderate (2+) eccentric aortic regurgitation is present.   The POLLY by continuity equation is .47 cm2   The dimensionless index is .15   The left atrium is severely dilated.   Mild concentric left ventricular hypertrophy is present.   Estimated left ventricular ejection fraction is 50 %.   The IVC is dilated.   Severe mitral stenosis is present.   Severe mitral annular calcification is present.   Severe (4+) eccentric mitral regurgitation is present.   No evidence of pericardial effusion.   Pleural effusion - right pleural effusion.   Mild to moderate pulmonic valvular regurgitation is present.   The right atrium appears moderately dilated.   The right ventricle is not always well visualized, appears grossly normal   in size.   The tricuspid valve leaflets appear mildly thickened and/or calcified, but   open well.   Mild to Moderate Tricuspid regurgitation is present.   Moderate pulmonary hypertension.        EXAM:  US KIDNEYS AND BLADDER                        PROCEDURE DATE:  2020      FINDINGS:    Right kidney:  7.9 x 3.8 x 4.0 cm. No hydronephrosis. Increased renal cortical echogenicity.    Left kidney:  7.5 x 4.0 x 4.3 cm. No hydronephrosis. Increased renal cortical echogenicity.    Urinary bladder: Within normal limits.    IMPRESSION:    No hydronephrosis.    Bilateral increased renal cortical echogenicity, consistent with renal parenchymal disease.

## 2020-09-09 NOTE — PROGRESS NOTE ADULT - SUBJECTIVE AND OBJECTIVE BOX
PCP:    REQUESTING PHYSICIAN:    REASON FOR CONSULT:    CHIEF COMPLAINT:    HPI:  CC:  Patient is a 89y old  Female who presents with a chief complaint of fall.    HPI:89F.  admitted 09/07/20.  presented from home to ED via EMS.  c/o fall.  reports she, "tripped over [her] own too feet."  denied LOC.  she has very poor recollection of events.  states she was laying on the floor for several hours before EMS arrived.      recently, patient was hospitalized at  from 08/13-08/15 due to mild exacerbation of HFpEF.  among her DC medications were potassium 20mEq po bid and Lasix 40mg po qd.    in ED, potassium was found to be elevated (6.8).  R insulin 4 units, CLARA MDI and Kayexalate were administered.  CPK was wnl.  furthermore, TnI was mildly elevated (0.063).  patient refusing further imaging (CXR as well as b/l hip and pelvis xray).  Cardiology was requested to evaluate symptoms. Pt has a history of severe AS and MS for which she is followed by [facility]. She has not seen her cardiologist for a period of time. She was evaluated 3 years ago for valvular intervention but a future course was undecided. She reports that she was relatively asymptomatic at that time. H/O atrial fibrillation, CVA, HTN, scarlet fever as a child.       9/8/20 Patient says she tripped and fell , does have shortness of breath on minimal activity uses 4 L NC oxygen , patient does have chronic Hip dislocation   9/9/20 Pt is short of breath today.      PAST MEDICAL & SURGICAL HISTORY:  Afib  CVA (cerebral vascular accident)  Hypothyroidism  Cerebrovascular accident (CVA)  Atrial fibrillation  Essential hypertension  Chronic multifocal osteomyelitis, other site  Scarlet fever  S/P debridement      SOCIAL HISTORY:    FAMILY HISTORY:  Family history of heart disease (Sibling)      ALLERGIES:  Allergies    No Known Allergies    Intolerances        MEDICATIONS:    MEDICATIONS  (STANDING):  apixaban 2.5 milliGRAM(s) Oral two times a day  furosemide    Tablet 20 milliGRAM(s) Oral daily  influenza   Vaccine 0.5 milliLiter(s) IntraMuscular once  levothyroxine 100 MICROGram(s) Oral daily  metoprolol tartrate 25 milliGRAM(s) Oral two times a day    MEDICATIONS  (PRN):  acetaminophen   Tablet .. 650 milliGRAM(s) Oral every 6 hours PRN Mild Pain (1 - 3)  ondansetron Injectable 4 milliGRAM(s) IV Push every 6 hours PRN Nausea and/or Vomiting        Vital Signs Last 24 Hrs  T(C): 36.7 (09 Sep 2020 10:48), Max: 36.7 (08 Sep 2020 17:34)  T(F): 98.1 (09 Sep 2020 10:48), Max: 98.1 (08 Sep 2020 17:34)  HR: 92 (09 Sep 2020 10:48) (92 - 99)  BP: 110/78 (09 Sep 2020 10:48) (110/78 - 127/72)  BP(mean): --  RR: 20 (08 Sep 2020 22:05) (18 - 20)  SpO2: 96% (09 Sep 2020 10:48) (96% - 97%)Daily     Daily I&O's Summary    08 Sep 2020 07:01  -  09 Sep 2020 07:00  --------------------------------------------------------  IN: 660 mL / OUT: 1650 mL / NET: -990 mL        PHYSICAL EXAM:    Constitutional: NAD, awake and alert, well-developed  HEENT: PERR, EOMI,  No oral cyananosis.  Neck:  supple,  No JVD  Respiratory: Breath sounds are clear bilaterally, No wheezing, rales or rhonchi  Cardiovascular: S1 and S2, regular rate and rhythm,2/6 SM, 1/6 DM  Gastrointestinal: Bowel Sounds present, soft, nontender.   Extremities: No peripheral edema. No clubbing or cyanosis.  Vascular: 2+ peripheral pulses  Neurological: A/O x 3, no focal deficits  Musculoskeletal: no calf tenderness.  Skin: No rashes.      LABS: All Labs Reviewed:                        12.4   10.46 )-----------( 233      ( 09 Sep 2020 08:02 )             40.4                         11.4   10.95 )-----------( 219      ( 08 Sep 2020 07:36 )             37.9                         12.4   11.88 )-----------( 254      ( 07 Sep 2020 06:25 )             39.5     09 Sep 2020 08:02    137    |  103    |  46     ----------------------------<  76     4.3     |  29     |  1.20   08 Sep 2020 07:36    139    |  105    |  47     ----------------------------<  74     4.2     |  28     |  1.35   07 Sep 2020 22:05    140    |  106    |  49     ----------------------------<  101    5.2     |  29     |  1.55     Ca    9.1        09 Sep 2020 08:02  Ca    9.0        08 Sep 2020 07:36  Ca    9.2        07 Sep 2020 22:05        CARDIAC MARKERS ( 07 Sep 2020 13:05 )  0.071 ng/mL / x     / x     / x     / x          Blood Culture: Organism --  Gram Stain Blood -- Gram Stain --  Specimen Source .Urine None  Culture-Blood --      09-09 @ 08:02  Pro Bnp [id number]    09-09 @ 08:02  TSH: 0.09  09-08 @ 07:36  TSH: 0.09      RADIOLOGY/EKG:< from: 12 Lead ECG (09.08.20 @ 09:22) >    Diagnosis Line Atrial fibrillation with rapid ventricular response  ST & T wave abnormality, consider inferolateral ischemia  Abnormal ECG  When compared with ECG of 07-SEP-2020 06:15,  No significant change was found  Confirmed by JERI LIRIANO MD ([id number]) on 9/8/2020 7:57:45 PM    < end of copied text >  < from: TTE Echo Complete w/o Contrast w/ Doppler (08.13.20 @ 09:52) >  Impression     Summary     Severe aortic stenosis is present.   At least moderate (2+) eccentric aortic regurgitation is present.   The POLLY by continuity equation is .47 cm2   The dimensionless index is .15   The left atrium is severely dilated.   Mild concentric left ventricular hypertrophy is present.   Estimated left ventricular ejection fraction is 50 %.   The IVC is dilated.   Severe mitral stenosis is present.   Severe mitral annular calcification is present.   Severe (4+) eccentric mitral regurgitation is present.   No evidence of pericardial effusion.   Pleural effusion - right pleural effusion.   Mild to moderate pulmonic valvular regurgitation is present.   The right atrium appears moderately dilated.   The right ventricle is not always well visualized, appears grossly normal   in size.   The tricuspid valve leaflets appear mildly thickened and/or calcified, but   open well.   Mild to Moderate Tricuspid regurgitation is present.   Moderate pulmonary hypertension.     Signature     ----------------------------------------------------------------   Electronically signed by Diamond Lewis MD(Interpreting   physician) on 08/13/2020 06:33 PM    < end of copied text >        ECHO/CARDIAC CATHTERIZATION/STRESS TEST:

## 2020-09-09 NOTE — PROGRESS NOTE ADULT - ASSESSMENT
89 y.o female with   PMH of a. fib on eliquis, h/o CVA, CHF, Severe AS and MR  HTN, hypothyroidism admitted for:       1. S/p Fall,  likely mechanical   No visible signs of trauma, but Pt refused Xrays   Did not  comply with PT eval, but was able to get up for orthostatics check   Orthostatics neg   Fall precautions   C/w tele: Paced, underline AFIB       2. Hyperkalemia with LULA/CKD stage II, resolved   S/p Insulin, albuterol, Kayexalate  K improved this am  Renal Fx improving , s/p 1L IVF Bolus   Avoid nephrotoxic meds   Renal sono: no obstruction   labs in am     3. Dyspnea,  Acute on    Chronic diastolic CHF 2/2 valvular Dz, has severe MR and AS   Pulse ox stable with 2l O2  CXR done with B/l Pulm congestion R>L   BNP 2500  C/w Lasix PO 20mg, will give dose of IV lasix now   will add morphine PRN  for dyspnea   weight daily   Last ECHO: EF 50%   D/w Dr Ghosh, due to advanced valvular Dz will be difficult to keep Pt euvolemic, palliative care and possible hospice recommended       4. Elevated troponin   No chest pain,   ECG: AFIB with ST and T wave changes, prolonged QT, suspect related to electrolyte abnormality   repeat EKG in am   Start  ASA, c/w metoprolol  CArdio eval appreciated, no further work up at this time       5. Abnormal UA, doubt UTI, asymptomatic   UCX: contamination   Will monitor       6. Severe MR and AS  Pt was evaluated in the past by Dr José for possible Sx, but declined   Monitor closely   Palliative eval recommended by cardio     7. Hypothyroidism   with low TSH   Decrease Synthroid to 100mcg  will repeat labs in am     8.  Chronic AFIB  clarification: no PPM   C/w A/c on low dose eliquis   C/w metoprolol     9. Severe protein calorie malnutrition   supplements  Supportive care     10. DVT PPx: eliquis        Pt is DNR/DNI

## 2020-09-09 NOTE — PROGRESS NOTE ADULT - PROBLEM SELECTOR PLAN 6
not a surgical candidate based on her age , multiple medical problem and fragility
not a surgical candidate based on her age , multiple medical problem and fragility

## 2020-09-09 NOTE — CONSULT NOTE ADULT - TREATMENT GUIDELINE COMMENT
MOLST 9/7: DNR and DNI    * Spent 62 minutes discussing GOC with family including Advance care planning, explanation and discussion of advance directives, reviewed all treatment/dispo options, and MOLST.

## 2020-09-09 NOTE — CONSULT NOTE ADULT - SUBJECTIVE AND OBJECTIVE BOX
HPI: Pt is a 89y old Female with hx of       PAIN: ( )Yes   ( )No  Level:  Location:  Intensity:    /10  Quality:  Aggravating Factors:  Alleviating Factors:  Radiation:  Duration/Timing:  Impact on ADLs:    DYSPNEA: ( ) Yes  ( ) No  Level:    PAST MEDICAL & SURGICAL HISTORY:  Afib  CVA (cerebral vascular accident)  Hypothyroidism  Cerebrovascular accident (CVA)  Atrial fibrillation  Essential hypertension  Chronic multifocal osteomyelitis, other site  Scarlet fever  S/P debridement      SOCIAL HX:    Hx opiate tolerance ( )YES  ( )NO    Baseline ADLs  (Prior to Admission)  ( ) Independent   ( )Dependent    FAMILY HISTORY:  Family history of heart disease (Sibling)      Review of Systems:    Anxiety-  Depression-  Physical Discomfort-  Dyspnea-  Constipation-  Diarrhea-  Nausea-  Vomiting-  Anorexia-  Weight Loss-   Cough-  Secretions-  Fatigue-  Weakness-  Delirium-    All other systems reviewed and negative  Unable to obtain/Limited due to:      PHYSICAL EXAM:    Vital Signs Last 24 Hrs  T(C): 36.7 (09 Sep 2020 10:48), Max: 36.7 (08 Sep 2020 17:34)  T(F): 98.1 (09 Sep 2020 10:48), Max: 98.1 (08 Sep 2020 17:34)  HR: 92 (09 Sep 2020 10:48) (92 - 99)  BP: 110/78 (09 Sep 2020 10:48) (110/78 - 127/72)  BP(mean): --  RR: 20 (08 Sep 2020 22:05) (18 - 20)  SpO2: 96% (09 Sep 2020 10:48) (96% - 97%)  Daily     Daily     PPSV2:   %  FAST:    General:  Mental Status:  HEENT:  Lungs:  Cardiac:  GI:  :  Ext:  Neuro:      LABS:                        12.4   10.46 )-----------( 233      ( 09 Sep 2020 08:02 )             40.4     09-09    137  |  103  |  46<H>  ----------------------------<  76  4.3   |  29  |  1.20    Ca    9.1      09 Sep 2020 08:02    TPro  x   /  Alb  3.1<L>  /  TBili  x   /  DBili  x   /  AST  x   /  ALT  x   /  AlkPhos  x   09-09      Albumin: Albumin, Serum: 3.1 g/dL (09-09 @ 08:02)      Allergies    No Known Allergies    Intolerances      MEDICATIONS  (STANDING):  apixaban 2.5 milliGRAM(s) Oral two times a day  furosemide    Tablet 20 milliGRAM(s) Oral daily  furosemide   Injectable 20 milliGRAM(s) IV Push once  influenza   Vaccine 0.5 milliLiter(s) IntraMuscular once  levothyroxine 100 MICROGram(s) Oral daily  metoprolol tartrate 25 milliGRAM(s) Oral two times a day    MEDICATIONS  (PRN):  acetaminophen   Tablet .. 650 milliGRAM(s) Oral every 6 hours PRN Mild Pain (1 - 3)  morphine  - Injectable 1 milliGRAM(s) IV Push every 6 hours PRN dyspnea  ondansetron Injectable 4 milliGRAM(s) IV Push every 6 hours PRN Nausea and/or Vomiting      RADIOLOGY/ADDITIONAL STUDIES: HPI: Ms. Sandoval is a 89y old Female coming from home with hx of Afib on Eliquis, CVA, CHF, Severe AS and MR  HTN, hypothyroidism, admitted 9/7 for mechanical fall, and as per notes pt was laying on the floor for several hours before EMS arrived. Of note pt recently hospitalized at  from 08/13-08/15 due to mild exacerbation of HFpEF; among her DC medications were potassium 20mEq po bid and Lasix 40mg po qd. In ED, potassium was found to be elevated (6.8), LULA (resolved with IVF), mildly elevated trops (2/2 to demand), and patient refused  further imaging (CXR as well as b/l hip and pelvis xray), dyspnea noted due to CHF. Palliative Care consulted to establish GOC- pt known to our service from last admission.       Met Ms. Sandoval this afternoon. Pt denied pain, noting only chronic dyspnea at baseline, worse with activity. She denied being on any meds for this beside lasix, which she spent time complaining about the dose of. She denied trying opioids before, suspicious of oxycodone, but after conversation willing to trial low dose morphine at bed time. However, did not seem too likely that she will actually try this. She also noted weakness from lying in the bed. She denied any other sx.     Pt was open to talking, though she spent most of the time complaining about being here and the staff. With frequent redirection pt was open to talking about her goals. See below for results of this conversation.     PAIN: ( )Yes   ( x)No    DYSPNEA: (x ) Yes  ( ) No  Level: mod    PAST MEDICAL & SURGICAL HISTORY:  Afib  CVA (cerebral vascular accident)  Hypothyroidism  Cerebrovascular accident (CVA)  Atrial fibrillation  Essential hypertension  Chronic multifocal osteomyelitis, other site  Scarlet fever  S/P debridement      SOCIAL HX: , lives alone, one living son    Hx opiate tolerance ( )YES  (x )NO    Baseline ADLs  (Prior to Admission)- aids 7d/week, walks with walker, has wheelchair, aids 10-6pm  ( ) Independent   (x )Dependent    FAMILY HISTORY:  Family history of heart disease (Sibling)      Review of Systems:    Anxiety- denies  Depression- denies  Constipation- denies  Diarrhea- denies  Nausea- denies  Vomiting- denies  Anorexia- yes  Weight Loss- yes   Cough- denies  Secretions- denies  Fatigue- yes  Weakness- yes  Delirium- no    All other systems reviewed and negative      PHYSICAL EXAM:    Vital Signs Last 24 Hrs  T(C): 36.7 (09 Sep 2020 10:48), Max: 36.7 (08 Sep 2020 17:34)  T(F): 98.1 (09 Sep 2020 10:48), Max: 98.1 (08 Sep 2020 17:34)  HR: 92 (09 Sep 2020 10:48) (92 - 99)  BP: 110/78 (09 Sep 2020 10:48) (110/78 - 127/72)  BP(mean): --  RR: 20 (08 Sep 2020 22:05) (18 - 20)  SpO2: 96% (09 Sep 2020 10:48) (96% - 97%)  Daily     Daily     PPSV2: 30  %    General: Elderly female lying in bed, NAD  Mental Status: AOx4  HEENT: mmm, +temporal wasting  Lungs: dec at bases bl  Cardiac: + s1 s2 rrr  GI: soft nt nd +bs, diaper in place  : voids  Skin/msk: moves all extremities, pitting dependent edema bl le to ankles, muscle and fat wasting throughout  Neuro: no focal deficits      LABS:                        12.4   10.46 )-----------( 233      ( 09 Sep 2020 08:02 )             40.4     09-09    137  |  103  |  46<H>  ----------------------------<  76  4.3   |  29  |  1.20    Ca    9.1      09 Sep 2020 08:02    TPro  x   /  Alb  3.1<L>  /  TBili  x   /  DBili  x   /  AST  x   /  ALT  x   /  AlkPhos  x   09-09      Albumin: Albumin, Serum: 3.1 g/dL (09-09 @ 08:02)      Allergies    No Known Allergies    Intolerances      MEDICATIONS  (STANDING):  apixaban 2.5 milliGRAM(s) Oral two times a day  furosemide    Tablet 20 milliGRAM(s) Oral daily  furosemide   Injectable 20 milliGRAM(s) IV Push once  influenza   Vaccine 0.5 milliLiter(s) IntraMuscular once  levothyroxine 100 MICROGram(s) Oral daily  metoprolol tartrate 25 milliGRAM(s) Oral two times a day    MEDICATIONS  (PRN):  acetaminophen   Tablet .. 650 milliGRAM(s) Oral every 6 hours PRN Mild Pain (1 - 3)  morphine  - Injectable 1 milliGRAM(s) IV Push every 6 hours PRN dyspnea  ondansetron Injectable 4 milliGRAM(s) IV Push every 6 hours PRN Nausea and/or Vomiting      RADIOLOGY/ADDITIONAL STUDIES:    EXAM:  CT BRAIN                        PROCEDURE DATE:  09/07/2020      IMPRESSION:    No evidence of acute intracranial hemorrhage, midline shift or CT evidence of acute territorial infarct.    If the patient's symptoms persist, consider short interval follow-up head CT or brain MRI if there are no MRI contraindications.    DAVIDE MCBRIDE M.D., ATTENDING RADIOLOGIST  This document has been electronically signed. Sep  7 2020  7:10AM    EXAM:  US KIDNEYS AND BLADDER                        PROCEDURE DATE:  09/07/2020      IMPRESSION:    No hydronephrosis.    Bilateral increased renal cortical echogenicity, consistent with renal parenchymal disease.    SERA SHAH M.D., ATTENDING RADIOLOGIST  This document has been electronically signed. Sep  7 2020 11:25AM

## 2020-09-09 NOTE — PROGRESS NOTE ADULT - PROBLEM SELECTOR PLAN 2
clinically class III , due to underlying severe valvular disease , apparently  not a candidate for intervention    minimal elevated troponin possible due to demand ischemia , consider hospice care. Will follow prn.

## 2020-09-09 NOTE — CDI QUERY NOTE - NSCDIOTHERTXTBX_GEN_ALL_CORE_HH
Patient admitted after fall  Noted to have LULA and Hyperkalemia    BMI= 19.8  malnourished and frail documented    Are the above clinical indicators indicative of a diagnosis  a) Severe protein calorie malnutrition  b) Moderate protein calorie malnutrition  c) Mild protein calorie malnutrition  d) Unspecified protein calorie malnutrition  e) No clinical evidence of protein calorie malnutrition  f) Other, please clarify

## 2020-09-10 PROCEDURE — 71250 CT THORAX DX C-: CPT | Mod: 26

## 2020-09-10 PROCEDURE — 99233 SBSQ HOSP IP/OBS HIGH 50: CPT

## 2020-09-10 PROCEDURE — 99223 1ST HOSP IP/OBS HIGH 75: CPT

## 2020-09-10 RX ORDER — HEPARIN SODIUM 5000 [USP'U]/ML
5000 INJECTION INTRAVENOUS; SUBCUTANEOUS EVERY 12 HOURS
Refills: 0 | Status: DISCONTINUED | OUTPATIENT
Start: 2020-09-10 | End: 2020-09-11

## 2020-09-10 RX ORDER — NITROGLYCERIN 6.5 MG
1 CAPSULE, EXTENDED RELEASE ORAL DAILY
Refills: 0 | Status: DISCONTINUED | OUTPATIENT
Start: 2020-09-10 | End: 2020-09-14

## 2020-09-10 RX ORDER — MORPHINE SULFATE 50 MG/1
1 CAPSULE, EXTENDED RELEASE ORAL EVERY 6 HOURS
Refills: 0 | Status: DISCONTINUED | OUTPATIENT
Start: 2020-09-10 | End: 2020-09-11

## 2020-09-10 RX ORDER — MORPHINE SULFATE 50 MG/1
2.5 CAPSULE, EXTENDED RELEASE ORAL EVERY 6 HOURS
Refills: 0 | Status: DISCONTINUED | OUTPATIENT
Start: 2020-09-10 | End: 2020-09-11

## 2020-09-10 RX ADMIN — Medication 20 MILLIGRAM(S): at 09:27

## 2020-09-10 RX ADMIN — Medication 100 MICROGRAM(S): at 05:43

## 2020-09-10 RX ADMIN — HEPARIN SODIUM 5000 UNIT(S): 5000 INJECTION INTRAVENOUS; SUBCUTANEOUS at 21:20

## 2020-09-10 RX ADMIN — Medication 1 PATCH: at 20:47

## 2020-09-10 RX ADMIN — Medication 1 PATCH: at 13:16

## 2020-09-10 RX ADMIN — Medication 25 MILLIGRAM(S): at 21:20

## 2020-09-10 RX ADMIN — Medication 25 MILLIGRAM(S): at 09:41

## 2020-09-10 RX ADMIN — MORPHINE SULFATE 1 MILLIGRAM(S): 50 CAPSULE, EXTENDED RELEASE ORAL at 09:28

## 2020-09-10 NOTE — CHART NOTE - TREATMENT: THE FOLLOWING DIET HAS BEEN RECOMMENDED
Diet, DASH/TLC:   Sodium & Cholesterol Restricted (09-07-20 @ 07:44) [active]    Suggest liberalize diet to regular to maximize caloric intake  Add ensure enlive 8 oz tid  add gelatein bid  New weight  Twice weekly weights  Encourage PO intake.   Monitor PO intake, tolerance, labs and weight.

## 2020-09-10 NOTE — DIETITIAN INITIAL EVALUATION ADULT. - OTHER INFO
HPI: 89 y.o female with   PMH of monique mathur on eliquis, h/o CVA, CHF, Severe AS and MR  HTN, hypothyroidism, presented from home to ED via EMS s/p fall. Pt   reports she  "tripped over [her] own two feet."  , she was walking to the bathroom. Denied LOC, denies CP or dizziness. Unable to obtain more details.  She was laying on the floor for several hours before EMS arrived.    Patient was hospitalized at  from 08/13-08/15 due to mild exacerbation of HFpEF.  among her DC medications were potassium 20mEq po bid and Lasix 40mg po qd.  in ED, potassium was found to be elevated (6.8).  Tx with insulin 4 units, CLARA MDI and Kayexalate were administered.  CPK was wnl.  , TnI was mildly elevated (0.063).  patient refused  further imaging (CXR as well as b/l hip and pelvis xray).    Had 1L of IVF    Pt has chronic diastolic CHF.   DASH diet HPI: 89 y.o female with   PMH of monique mathur on eliquis, h/o CVA, CHF, Severe AS and MR  HTN, hypothyroidism, presented from home to ED via EMS s/p fall. Pt   reports she  "tripped over [her] own two feet."  , she was walking to the bathroom. Denied LOC, denies CP or dizziness. Unable to obtain more details.  She was laying on the floor for several hours before EMS arrived.    Patient was hospitalized at  from 08/13-08/15 due to mild exacerbation of HFpEF.  among her DC medications were potassium 20mEq po bid and Lasix 40mg po qd.  in ED, potassium was found to be elevated (6.8).  Tx with insulin 4 units, CLARA MDI and Kayexalate were administered.  CPK was wnl.  , TnI was mildly elevated (0.063).  patient refused  further imaging (CXR as well as b/l hip and pelvis xray).    Had 1L of IVF    Pt has chronic diastolic CHF.   DASH diet  PT visited at bedside, was lethargic.  Lunch tray at bedside untouched.  NFPE performed.

## 2020-09-10 NOTE — CHART NOTE - NSCHARTNOTEFT_GEN_A_CORE
Pt meets criteria for severe protein-calorie malnutrition in context of chronic disease .    NFPE reveals severe muscle wasting (temples, clavicles, shoulders, interosseus)   Moderate muscle wasting (thighs, calves.)    Moderate/severe fat wasting triceps, buccal area.)   PO intake < 75% nutritional needs > one month

## 2020-09-10 NOTE — CHART NOTE - FINDINGS AS BASED ON:
concerns/Food acceptance and intake status from observations by staff/Comprehensive nutrition assessment and consultation

## 2020-09-10 NOTE — DIETITIAN INITIAL EVALUATION ADULT. - MALNUTRITION
Pt meets criteria for severe protein-calorie malnutrition in context of chronic disease .  NFPE reveals severe muscle wasting (temples, clavicles, shoulders, interosseus) Moderate muscle wasting (thighs, calves.)  Moderate/severe fat wasting triceps, buccal area.) PO intake < 75% nutritional needs > one month Pt meets criteria for severe protein-calorie malnutrition in context of chronic disease

## 2020-09-10 NOTE — CONSULT NOTE ADULT - SUBJECTIVE AND OBJECTIVE BOX
History of Present Illness:  89y Female pmhx CVA, CHF, severe AS and MS, mod pulm HTN, AF on AC admitted 9/7 s/p fall at home. Pt w SOB, b/l effusions. CT chest today shows a large right effusion. Called for drainage. Pt seen, pleasant. C/o SOB, difficult to complete sentences. States has been SOB for a "while", has increased. Last eliquis last night.    PMH/PSH:  Afib  CVA (cerebral vascular accident)  Hypothyroidism  Cerebrovascular accident (CVA)  Atrial fibrillation  Essential hypertension  Chronic multifocal osteomyelitis, other site  Scarlet fever    S/P debridement      Relevant Family History  FAMILY HISTORY:  Family history of heart disease (Sibling)      SOCIAL HISTORY:  Smoker: [ ] Yes  [x ] No        PACK YEARS:                         WHEN QUIT?  ETOH use: [ ] Yes  [x ] No              FREQUENCY / QUANTITY:  Ilicit Drug use:  [ ] Yes  [x ] No  Occupation: retired teacher  Live with: alone      MEDICATIONS  (STANDING):  furosemide    Tablet 20 milliGRAM(s) Oral daily  heparin   Injectable 5000 Unit(s) SubCutaneous every 12 hours  influenza   Vaccine 0.5 milliLiter(s) IntraMuscular once  levothyroxine 100 MICROGram(s) Oral daily  metoprolol tartrate 25 milliGRAM(s) Oral two times a day  nitroglycerin    Patch 0.2 mG/Hr(s) 1 patch Transdermal daily    MEDICATIONS  (PRN):  acetaminophen   Tablet .. 650 milliGRAM(s) Oral every 6 hours PRN Mild Pain (1 - 3)  morphine  - Injectable 1 milliGRAM(s) IV Push every 6 hours PRN breakthrough dyspnea  morphine Concentrate 2.5 milliGRAM(s) Oral every 6 hours PRN dyspnea  ondansetron Injectable 4 milliGRAM(s) IV Push every 6 hours PRN Nausea and/or Vomiting      Allergies: No Known Allergies                                                            LABS:                        12.4   10.46 )-----------( 233      ( 09 Sep 2020 08:02 )             40.4     09-09    137  |  103  |  46<H>  ----------------------------<  76  4.3   |  29  |  1.20    Ca    9.1      09 Sep 2020 08:02    TPro  x   /  Alb  3.1<L>  /  TBili  x   /  DBili  x   /  AST  x   /  ALT  x   /  AlkPhos  x   09-09    < from: CT Chest No Cont (09.10.20 @ 12:04) >  FINDINGS:    LUNGS AND AIRWAYS: Patent central airways.  Compression atelectasis dependent lower lobes. A few septal lines and ill-defined groundglass opacity concerning for pulmonary edema.  PLEURA: Moderatelylarge right and a small left pleural effusion.  MEDIASTINUM AND DONALDO: No lymphadenopathy.  VESSELS: Coronary artery calcification. Enlarged main pulmonary artery measuring 4.2 cm. Correlate for pulmonary arterial hypertension.  HEART: Cardiomegaly. Aortic valve and mitral annulus calcification. No significant pericardial effusion.  CHEST WALL AND LOWER NECK: Anasarca.  VISUALIZED UPPER ABDOMEN: Grossly unremarkable.  BONES: Within normal limits.    IMPRESSION:  Bilateral pleural effusions, right more than left, cardiomegaly and question of pulmonary edema.    Anasarca, enlarged main pulmonary artery and additional findings as above.    < end of copied text >                Review of Systems             Constitutional: , general malaise,   HEENT: denies  Respiratory:SOB, CROUCH,   Cardiovascular: denies    Gastrointestinal: denies    Genitourinary: denies   Skin/Breast: denies s   Musculoskeletal: myalgias, arthritis,  Neurologic: s/p CVA   Psychiatric: denies   Endocrine: denies    Hematology/Oncology: denies    ROS negative x 10 systems except as noted above    T(C): 36.9 (09-10-20 @ 16:40), Max: 36.9 (09-10-20 @ 16:40)  HR: 107 (09-10-20 @ 16:40) (68 - 107)  BP: 116/45 (09-10-20 @ 16:40) (114/58 - 123/63)  RR: 20 (09-10-20 @ 16:40) (20 - 20)  SpO2: 100% (09-10-20 @ 16:40) (98% - 100%)      Physical Exam  General: tachypnic                                                         Neuro: alert, scattered thoughts, able to answer questions                   Chest: decreased BS on right, accessory muscle use noted  CV: RRR,   GI: soft, NT, ND,   Extremities: warm, edema

## 2020-09-10 NOTE — PROGRESS NOTE ADULT - SUBJECTIVE AND OBJECTIVE BOX
CC: shortness of breath      HPI: 89 y.o female with   PMH of monique mathur on eliquis, h/o CVA, CHF, Severe AS and MR  HTN, hypothyroidism, presented from home to ED via EMS s/p fall. Pt   reports she  "tripped over [her] own two feet."  , she was walking to the bathroom. Denied LOC, denies CP or dizziness. Unable to obtain more details.  She was laying on the floor for several hours before EMS arrived.    Patient was hospitalized at  from 08/13-08/15 due to mild exacerbation of HFpEF.  among her DC medications were potassium 20mEq po bid and Lasix 40mg po qd.  in ED, potassium was found to be elevated (6.8).  Tx with insulin 4 units, CLARA MDI and Kayexalate were administered.  CPK was wnl.  , TnI was mildly elevated (0.063).  patient refused  further imaging (CXR as well as b/l hip and pelvis xray).    Had 1L of IVF       INTERVAL HPI/ OVERNIGHT EVENTS:  Pt was seen and  examined, awake and alert, but c/o difficulty breathing and feeling fatigued. C/o left elbow pain, no trauma noted, agreed with CT chest       REVIEW OF SYSTEMS:  All other review of systems is negative unless indicated above    Vital Signs Last 24 Hrs  T(C): 36.7 (10 Sep 2020 09:56), Max: 36.8 (09 Sep 2020 20:10)  T(F): 98 (10 Sep 2020 09:56), Max: 98.2 (09 Sep 2020 20:10)  HR: 68 (10 Sep 2020 09:56) (68 - 100)  BP: 114/58 (10 Sep 2020 09:56) (114/58 - 123/63)  BP(mean): --  RR: 20 (10 Sep 2020 09:56) (20 - 20)  SpO2: 98% (10 Sep 2020 09:56) (98% - 98%)    CAPILLARY BLOOD GLUCOSE    PHYSICAL EXAM:  General: Well developed; malnourished, frail elderly female,  dyspneic, on NC   Eyes: PERRLA, EOMI; conjunctiva and sclera clear  Head: Normocephalic; atraumatic  ENMT: No nasal discharge; airway clear  Neck: Supple; non tender; no masses  Respiratory: Diminished BS at bases b/l.  B/l   rales   Cardiovascular: Irregular rate and rhythm. S1 and S2 Normal; +  murmur  Gastrointestinal: Soft non-tender non-distended; Normal bowel sounds  Genitourinary: No  suprapubic  tenderness  Extremities: + le edema, venous stasis changes   Neurological: Alert and oriented x3, non focal   Skin: Warm and dry. No acute rash  Lymph Nodes: No acute cervical adenopathy  Musculoskeletal: Normal muscle tone and strength      Medications:  MEDICATIONS  (STANDING):  furosemide    Tablet 20 milliGRAM(s) Oral daily  heparin   Injectable 5000 Unit(s) SubCutaneous every 12 hours  influenza   Vaccine 0.5 milliLiter(s) IntraMuscular once  levothyroxine 100 MICROGram(s) Oral daily  metoprolol tartrate 25 milliGRAM(s) Oral two times a day  nitroglycerin    Patch 0.2 mG/Hr(s) 1 patch Transdermal daily      Labs: All Labs Reviewed:                        12.4   10.46 )-----------( 233      ( 09 Sep 2020 08:02 )             40.4     09-09    137  |  103  |  46<H>  ----------------------------<  76  4.3   |  29  |  1.20    Ca    9.1      09 Sep 2020 08:02    TPro  x   /  Alb  3.1<L>  /  TBili  x   /  DBili  x   /  AST  x   /  ALT  x   /  AlkPhos  x   09-09    Blood Culture: 09-07 @ 08:29  Organism --  Gram Stain Blood -- Gram Stain --  Specimen Source .Urine None  Culture-Blood --    >=3 organisms. Probable collection contamination.    Urine Culture: Organism: --  gram stain: --  09-07 @ 08:29     >=3 organisms. Probable collection contamination.      RADIOLOGY/EKG:  < from: CT Chest No Cont (09.10.20 @ 12:04) >    EXAM:  CT CHEST                            PROCEDURE DATE:  09/10/2020          INTERPRETATION:  CLINICAL INFORMATION: Pleural effusion    COMPARISON: 04/25/2018.    PROCEDURE:  CT of the Chest was performed without intravenous contrast.  Sagittal and coronal reformats were performed.    FINDINGS:    LUNGS AND AIRWAYS: Patent central airways.  Compression atelectasis dependent lower lobes. A few septal lines and ill-defined groundglass opacity concerning for pulmonary edema.  PLEURA: Moderatelylarge right and a small left pleural effusion.  MEDIASTINUM AND DONALDO: No lymphadenopathy.  VESSELS: Coronary artery calcification. Enlarged main pulmonary artery measuring 4.2 cm. Correlate for pulmonary arterial hypertension.  HEART: Cardiomegaly. Aortic valve and mitral annulus calcification. No significant pericardial effusion.  CHEST WALL AND LOWER NECK: Anasarca.  VISUALIZED UPPER ABDOMEN: Grossly unremarkable.  BONES: Within normal limits.    IMPRESSION:  Bilateral pleural effusions, right more than left, cardiomegaly and question of pulmonary edema.    Anasarca, enlarged main pulmonary artery and additional findings as above.    < end of copied text >    < from: TTE Echo Complete w/o Contrast w/ Doppler (08.13.20 @ 09:52) >   Impression     Summary     Severe aortic stenosis is present.   At least moderate (2+) eccentric aortic regurgitation is present.   The POLLY by continuity equation is .47 cm2   The dimensionless index is .15   The left atrium is severely dilated.   Mild concentric left ventricular hypertrophy is present.   Estimated left ventricular ejection fraction is 50 %.   The IVC is dilated.   Severe mitral stenosis is present.   Severe mitral annular calcification is present.   Severe (4+) eccentric mitral regurgitation is present.   No evidence of pericardial effusion.   Pleural effusion - right pleural effusion.   Mild to moderate pulmonic valvular regurgitation is present.    < end of copied text >      DVT PPX: eliquis on hold, heparin sq     Advance Directive: DNR/DNI     Disposition: throacentesis

## 2020-09-10 NOTE — DIETITIAN INITIAL EVALUATION ADULT. - ADD RECOMMEND
Suggest add Vit C 500 mg BID, add Zinc Sulfate 220 mg x 10 days to promote wound healing. Record PO intake in EMR after each meal (nursing.) Encourage PO intake. Monitor PO intake, tolerance, labs and weight.

## 2020-09-10 NOTE — PROGRESS NOTE ADULT - ASSESSMENT
89y old Female coming from home with hx of Afib on Eliquis, CVA, CHF, Severe AS and MR  HTN, hypothyroidism, admitted 9/7 for mechanical fall, and as per notes pt was laying on the floor for several hours before EMS arrived. Of note pt recently hospitalized at  from 08/13-08/15 due to mild exacerbation of HFpEF; among her DC medications were potassium 20mEq po bid and Lasix 40mg po qd. In ED, potassium was found to be elevated (6.8), LULA (resolved with IVF), mildly elevated trops (2/2 to demand), and patient refused  further imaging (CXR as well as b/l hip and pelvis xray), dyspnea noted due to CHF. Palliative Care consulted to establish GOC- pt known to our service from last admission.      1) Dyspnea/CHF  - CHF with valvular disease  - cardio notes appreciated- class II HF, not a candidate for repair, rec to consider hospice (Pt not ready for that option)  - as per primary team imaging showing effusion and plan for CT to consider tap for palliative purposes  - c/w supplemental O2  - on lasix as per team  - added low dose roxanol 2.5 mg yesterday, which pt tolerated and open to trying again-->[id number]  - a/w keeping IV dose available if needed    2) Debility/Fall  - PPS<40% at baseline  - 7d/week of aids for ADLs  - PT note appreciated recommended [facility] vs. home with PT     3) LULA on CkD  - hyperkalemia resolved with medications  - Cr improved as well  - c/w monitoring     4) Prognosis  - poor  - in setting of advanced age, CHF with severe valvular disorder that pt is not candidate for, O2-dependance, increasing debility with fall, recurrent hospitalization, evidence of malnutrition on exam, PPS<40% with 7d/week of aid support for ADLs, would fit criteria for hospice. Pt not on board with this plan at this time.     5) GOC/Advanced Directives  - pt has capacity for decision making  - no HCP noted, son would be surrogate decision maker (Riccardo) but pt does not want him called  - MOLST- DNR and DNI (9/7/20)  - GOC conversation held- see consult note for details- wants home with pall, not ready for hospice    Thank you for including us in Ms. Sandoval's care. Will continue to follow with you.    Shama Barba MD  Palliative Care Attending

## 2020-09-10 NOTE — PROGRESS NOTE ADULT - SUBJECTIVE AND OBJECTIVE BOX
HPI: Pt seen and examined this am in follow up for sx. Pt notes not being a morning person and that she is tired, but was pleasant. She recalled our conversation yesterday, again expressing gratefulness for the time spent. However, she did not recall trying morphine last night, though it was given by mouth and also by IV as per RN. She asked how she would know if she felt better on it, denying feeling any side effects. Explained that it normally, if effective, would help her feel less winded. She again could not recall what she got, but was open to trying this again today if she was uncomfortable. Noted plan to share this with her team so they could also offer it to her if she forgot to ask. She denied any further issues, saying she was going back to sleep (effectively ending conversation). Shared above with NICKIE Martinez and Dr. Colbert.       PAIN: denies    DYSPNEA: on exertion      ROS:    Fatigue- yes, as above  Weakness- yes, unchanged    All other systems reviewed and negative      PHYSICAL EXAM:    Vital Signs Last 24 Hrs  T(C): 36.6 (10 Sep 2020 05:36), Max: 36.8 (09 Sep 2020 20:10)  T(F): 97.9 (10 Sep 2020 05:36), Max: 98.2 (09 Sep 2020 20:10)  HR: 100 (09 Sep 2020 20:10) (100 - 100)  BP: 123/63 (09 Sep 2020 20:10) (123/63 - 123/63)  RR: 20 (10 Sep 2020 05:36) (20 - 20)  SpO2: 98% (10 Sep 2020 05:36) (98% - 98%)  Daily     Daily Weight in k (10 Sep 2020 09:32)    PPSV2: 30  %    General: Elderly female lying in bed, NAD  Mental Status: AOx4  HEENT: mmm, +temporal wasting  Lungs: dec at bases bl  Cardiac: + s1 s2 rrr  GI: soft nt nd +bs, diaper in place  : voids  Skin/msk: moves all extremities, pitting dependent edema bl le to ankles, muscle and fat wasting throughout  Neuro: no focal deficits      LABS:                        12.4   10.46 )-----------( 233      ( 09 Sep 2020 08:02 )             40.4     09-    137  |  103  |  46<H>  ----------------------------<  76  4.3   |  29  |  1.20    Ca    9.1      09 Sep 2020 08:02    TPro  x   /  Alb  3.1<L>  /  TBili  x   /  DBili  x   /  AST  x   /  ALT  x   /  AlkPhos  x         Albumin: Albumin, Serum: 3.1 g/dL ( @ 08:02)      Allergies    No Known Allergies    Intolerances      MEDICATIONS  (STANDING):  apixaban 2.5 milliGRAM(s) Oral two times a day  furosemide    Tablet 20 milliGRAM(s) Oral daily  influenza   Vaccine 0.5 milliLiter(s) IntraMuscular once  levothyroxine 100 MICROGram(s) Oral daily  metoprolol tartrate 25 milliGRAM(s) Oral two times a day  nitroglycerin    Patch 0.2 mG/Hr(s) 1 patch Transdermal daily    MEDICATIONS  (PRN):  acetaminophen   Tablet .. 650 milliGRAM(s) Oral every 6 hours PRN Mild Pain (1 - 3)  morphine  - Injectable 1 milliGRAM(s) IV Push every 6 hours PRN dyspnea  morphine Concentrate 2.5 milliGRAM(s) Oral at bedtime PRN dyspnea  ondansetron Injectable 4 milliGRAM(s) IV Push every 6 hours PRN Nausea and/or Vomiting      RADIOLOGY:    EXAM:  XR CHEST PORTABLE ROUTINE 1V                        PROCEDURE DATE:  2020    INTERPRETATION:  Portable chest radiograph    CLINICAL INFORMATION:   Short of breath.    TECHNIQUE:  Portable  AP view of the chest was obtained.    COMPARISON: No previous examinations are available for review.    FINDINGS show increasing haziness overlying the RIGHT hemithorax indicating a moderate RIGHT pleural effusion and/or diffuse RIGHT lung airspace disease. LEFT lung parenchyma clear. There is. No pneumothorax.    The  heart is enlarged in transverse diameter. No hilar mass. Trachea midline.  There is a heavily calcified mitral annulus.   Visualized osseous structures are intact.        IMPRESSION: Moderate-large RIGHT pleural effusion. Upright and lateral radiograph recommended      OSKAR SOTO M.D., ATTENDING RADIOLOGIST  This document has been electronically signed. Sep  9 2020  3:56PM

## 2020-09-10 NOTE — DIETITIAN INITIAL EVALUATION ADULT. - PERTINENT MEDS FT
MEDICATIONS  (STANDING):  apixaban 2.5 milliGRAM(s) Oral two times a day  furosemide    Tablet 20 milliGRAM(s) Oral daily  influenza   Vaccine 0.5 milliLiter(s) IntraMuscular once  levothyroxine 100 MICROGram(s) Oral daily  metoprolol tartrate 25 milliGRAM(s) Oral two times a day    MEDICATIONS  (PRN):  acetaminophen   Tablet .. 650 milliGRAM(s) Oral every 6 hours PRN Mild Pain (1 - 3)  morphine  - Injectable 1 milliGRAM(s) IV Push every 6 hours PRN dyspnea  morphine Concentrate 2.5 milliGRAM(s) Oral at bedtime PRN dyspnea  ondansetron Injectable 4 milliGRAM(s) IV Push every 6 hours PRN Nausea and/or Vomiting

## 2020-09-10 NOTE — DIETITIAN INITIAL EVALUATION ADULT. - ENERGY NEEDS
Ht.  61    "        Wt.    64.2    kg               BMI    26.7              IBW   48    kg               Pt is at 48   %  IBW  Adj BW    52     Using IBW for protein calculation Ht.  61    "        Wt.   44.5   kg               BMI    18.49            IBW   48    kg               Pt is at 93   %  IBW

## 2020-09-10 NOTE — DIETITIAN INITIAL EVALUATION ADULT. - PHYSICAL APPEARANCE
Signs of muscle wasting, fat wasting, moderate/severe/other (specify) Eric 12   PU stage 2 coccyx  BM    9/9

## 2020-09-10 NOTE — PROGRESS NOTE ADULT - ASSESSMENT
89 y.o female with   PMH of a. fib on eliquis, h/o CVA, CHF, Severe AS and MR  HTN, hypothyroidism admitted for:     #. S/p Fall,  likely mechanical   No visible signs of trauma, but Pt refused Xrays   Did not  comply with PT eval, but was able to get up for orthostatics check   Orthostatics neg   Fall precautions   C/w tele: Paced, underline AFIB     # Hyperkalemia with LULA/CKD stage II, resolved   S/p Insulin, albuterol, Kayexalate  K improved this am  Renal Fx improving , s/p 1L IVF Bolus   Avoid nephrotoxic meds   Renal sono: no obstruction     # Dyspnea,  Acute on    Chronic diastolic CHF 2/2 valvular Dz, has severe MR and AS   Pulse ox stable with 2l O2  CXR done with B/l Pulm congestion R>L   CT chest on 9/10 shows Bilateral pleural effusions, right more than left, cardiomegaly and question of pulmonary edema.  Anasarca, enlarged main pulmonary artery   BNP 2500  C/w Lasix PO 20mg, will give dose of IV lasix now   will add morphine PRN  for dyspnea   weight daily   Last ECHO: EF 50%   D/w Dr Ghosh, due to advanced valvular Dz will be difficult to keep Pt euvolemic, palliative care and possible hospice recommended   however pt. refused     # B/L pleural effusion right>left  - hold eliquis for now, start heparin sq - pt. will most likely refuse   - CT surgery consult - BP spoke with PA - will see pt. thoracentesis tomorrow 9/11 if pt. agrees       # Elevated troponin   No chest pain,   ECG: AFIB with ST and T wave changes, prolonged QT, suspect related to electrolyte abnormality   repeat EKG in am   Start  ASA, c/w metoprolol  CArdio eval appreciated, no further work up at this time       #. Abnormal UA, doubt UTI, asymptomatic   UCX: contamination   Will monitor       # Severe MR and AS  Pt was evaluated in the past by Dr José for possible Sx, but declined   Monitor closely   Palliative eval recommended by cardio     #  Hypothyroidism   with low TSH   Decrease Synthroid to 100mcg  will repeat labs in am     #  Chronic AFIB  clarification: no PPM   C/w A/c on low dose eliquis  - will hold for throacentesis tomorrow   C/w metoprolol     # Severe protein calorie malnutrition   supplements  Supportive care     Goals of care conversation  Palliative consult appreciated: poor prognosis,  in setting of advanced age, CHF with severe valvular disorder that pt is not candidate for, O2-dependance, increasing debility with fall, recurrent hospitalization, evidence of malnutrition on exam, PPS<40% with 7d/week of aid support for ADLs, would fit criteria for hospice. Pt not on board with this plan at this time. 89 y.o female with   PMH of a. fib on eliquis, h/o CVA, CHF, Severe AS and MR  HTN, hypothyroidism admitted for:     #. S/p Fall,  likely mechanical   No visible signs of trauma, but Pt refused Xrays   Did not  comply with PT eval, but was able to get up for orthostatics check   Orthostatics neg   Fall precautions   C/w tele: Paced, underline AFIB     # Hyperkalemia with LULA/CKD stage II, resolved   S/p Insulin, albuterol, Kayexalate  K improved this am  Renal Fx improving , s/p 1L IVF Bolus   Avoid nephrotoxic meds   Renal sono: no obstruction     # Dyspnea,  Acute on    Chronic diastolic CHF 2/2 valvular Dz, has severe MR and AS   Pulse ox stable with 2l O2  CXR done with B/l Pulm congestion R>L   CT chest on 9/10 shows Bilateral pleural effusions, right more than left, cardiomegaly and question of pulmonary edema.  Anasarca, enlarged main pulmonary artery   BNP 2500  C/w Lasix PO 20mg, will give dose of IV lasix now   will add morphine PRN  for dyspnea   weight daily   Last ECHO: EF 50%   D/w Dr Ghosh, due to advanced valvular Dz will be difficult to keep Pt euvolemic, palliative care and possible hospice recommended   however pt. refused     # B/L pleural effusion right>left  - hold eliquis for now, start heparin sq - pt. will most likely refuse   - CT surgery consult - BP spoke with PA - will see pt. thoracentesis tomorrow 9/11 if pt. agrees   - Pulmonary consult - Dr. Goldsmith - notified by NP       # Elevated troponin   No chest pain,   ECG: AFIB with ST and T wave changes, prolonged QT, suspect related to electrolyte abnormality   repeat EKG in am   Start  ASA, c/w metoprolol  CArdio eval appreciated, no further work up at this time       #. Abnormal UA, doubt UTI, asymptomatic   UCX: contamination   Will monitor       # Severe MR and AS  Pt was evaluated in the past by Dr José for possible Sx, but declined   Monitor closely   Palliative eval recommended by cardio     #  Hypothyroidism   with low TSH   Decrease Synthroid to 100mcg  will repeat labs in am     #  Chronic AFIB  clarification: no PPM   C/w A/c on low dose eliquis  - will hold for throacentesis tomorrow   C/w metoprolol     # Severe protein calorie malnutrition   supplements  Supportive care     NP spoke with pt's son  - 9/10 - and explained pt's condition in detail  He will try to convince his mother to accept thoracentesis     Goals of care conversation  Palliative consult appreciated: poor prognosis,  in setting of advanced age, CHF with severe valvular disorder that pt is not candidate for, O2-dependance, increasing debility with fall, recurrent hospitalization, evidence of malnutrition on exam, PPS<40% with 7d/week of aid support for ADLs, would fit criteria for hospice. Pt not on board with this plan at this time.

## 2020-09-11 LAB
ANION GAP SERPL CALC-SCNC: 13 MMOL/L — SIGNIFICANT CHANGE UP (ref 5–17)
APPEARANCE UR: ABNORMAL
BACTERIA # UR AUTO: NEGATIVE — SIGNIFICANT CHANGE UP
BASE EXCESS BLDA CALC-SCNC: -1 MMOL/L — SIGNIFICANT CHANGE UP (ref -2–2)
BILIRUB UR-MCNC: ABNORMAL
BUN SERPL-MCNC: 60 MG/DL — HIGH (ref 7–23)
CALCIUM SERPL-MCNC: 9.2 MG/DL — SIGNIFICANT CHANGE UP (ref 8.5–10.1)
CHLORIDE SERPL-SCNC: 103 MMOL/L — SIGNIFICANT CHANGE UP (ref 96–108)
CO2 SERPL-SCNC: 21 MMOL/L — LOW (ref 22–31)
COLOR SPEC: YELLOW — SIGNIFICANT CHANGE UP
COMMENT - URINE: SIGNIFICANT CHANGE UP
CREAT SERPL-MCNC: 1.72 MG/DL — HIGH (ref 0.5–1.3)
DIFF PNL FLD: ABNORMAL
EPI CELLS # UR: ABNORMAL
GAS PNL BLDA: SIGNIFICANT CHANGE UP
GLUCOSE SERPL-MCNC: 40 MG/DL — CRITICAL LOW (ref 70–99)
GLUCOSE UR QL: NEGATIVE MG/DL — SIGNIFICANT CHANGE UP
HCO3 BLDA-SCNC: 25 MMOL/L — SIGNIFICANT CHANGE UP (ref 21–29)
HCT VFR BLD CALC: 42.5 % — SIGNIFICANT CHANGE UP (ref 34.5–45)
HGB BLD-MCNC: 13.6 G/DL — SIGNIFICANT CHANGE UP (ref 11.5–15.5)
INR BLD: 2.66 RATIO — HIGH (ref 0.88–1.16)
INR BLD: 3.33 RATIO — HIGH (ref 0.88–1.16)
KETONES UR-MCNC: ABNORMAL
LEUKOCYTE ESTERASE UR-ACNC: ABNORMAL
MCHC RBC-ENTMCNC: 31.8 PG — SIGNIFICANT CHANGE UP (ref 27–34)
MCHC RBC-ENTMCNC: 32 GM/DL — SIGNIFICANT CHANGE UP (ref 32–36)
MCV RBC AUTO: 99.3 FL — SIGNIFICANT CHANGE UP (ref 80–100)
NITRITE UR-MCNC: NEGATIVE — SIGNIFICANT CHANGE UP
PCO2 BLDA: 50 MMHG — HIGH (ref 32–46)
PH BLDA: 7.32 — LOW (ref 7.35–7.45)
PH UR: 5 — SIGNIFICANT CHANGE UP (ref 5–8)
PLATELET # BLD AUTO: 227 K/UL — SIGNIFICANT CHANGE UP (ref 150–400)
PO2 BLDA: 66 MMHG — LOW (ref 74–108)
POTASSIUM SERPL-MCNC: 5.3 MMOL/L — SIGNIFICANT CHANGE UP (ref 3.5–5.3)
POTASSIUM SERPL-SCNC: 5.3 MMOL/L — SIGNIFICANT CHANGE UP (ref 3.5–5.3)
PROT UR-MCNC: 100 MG/DL
PROTHROM AB SERPL-ACNC: 29.5 SEC — HIGH (ref 10.6–13.6)
PROTHROM AB SERPL-ACNC: 36.5 SEC — HIGH (ref 10.6–13.6)
RBC # BLD: 4.28 M/UL — SIGNIFICANT CHANGE UP (ref 3.8–5.2)
RBC # FLD: 14.6 % — HIGH (ref 10.3–14.5)
RBC CASTS # UR COMP ASSIST: ABNORMAL /HPF (ref 0–4)
SAO2 % BLDA: 92 % — SIGNIFICANT CHANGE UP (ref 92–96)
SODIUM SERPL-SCNC: 137 MMOL/L — SIGNIFICANT CHANGE UP (ref 135–145)
SP GR SPEC: 1.02 — SIGNIFICANT CHANGE UP (ref 1.01–1.02)
UROBILINOGEN FLD QL: 1 MG/DL
WBC # BLD: 15.27 K/UL — HIGH (ref 3.8–10.5)
WBC # FLD AUTO: 15.27 K/UL — HIGH (ref 3.8–10.5)
WBC UR QL: SIGNIFICANT CHANGE UP

## 2020-09-11 PROCEDURE — 99233 SBSQ HOSP IP/OBS HIGH 50: CPT

## 2020-09-11 PROCEDURE — 99497 ADVNCD CARE PLAN 30 MIN: CPT

## 2020-09-11 PROCEDURE — 99232 SBSQ HOSP IP/OBS MODERATE 35: CPT

## 2020-09-11 RX ORDER — CEFTRIAXONE 500 MG/1
INJECTION, POWDER, FOR SOLUTION INTRAMUSCULAR; INTRAVENOUS
Refills: 0 | Status: DISCONTINUED | OUTPATIENT
Start: 2020-09-11 | End: 2020-09-11

## 2020-09-11 RX ORDER — MORPHINE SULFATE 50 MG/1
2.5 CAPSULE, EXTENDED RELEASE ORAL EVERY 6 HOURS
Refills: 0 | Status: DISCONTINUED | OUTPATIENT
Start: 2020-09-11 | End: 2020-09-11

## 2020-09-11 RX ORDER — CEFTRIAXONE 500 MG/1
1000 INJECTION, POWDER, FOR SOLUTION INTRAMUSCULAR; INTRAVENOUS ONCE
Refills: 0 | Status: COMPLETED | OUTPATIENT
Start: 2020-09-11 | End: 2020-09-11

## 2020-09-11 RX ORDER — PHYTONADIONE (VIT K1) 5 MG
2.5 TABLET ORAL ONCE
Refills: 0 | Status: COMPLETED | OUTPATIENT
Start: 2020-09-11 | End: 2020-09-11

## 2020-09-11 RX ORDER — CEFTRIAXONE 500 MG/1
1000 INJECTION, POWDER, FOR SOLUTION INTRAMUSCULAR; INTRAVENOUS EVERY 24 HOURS
Refills: 0 | Status: DISCONTINUED | OUTPATIENT
Start: 2020-09-12 | End: 2020-09-14

## 2020-09-11 RX ORDER — SODIUM CHLORIDE 9 MG/ML
1000 INJECTION, SOLUTION INTRAVENOUS
Refills: 0 | Status: DISCONTINUED | OUTPATIENT
Start: 2020-09-11 | End: 2020-09-11

## 2020-09-11 RX ORDER — CEFTRIAXONE 500 MG/1
INJECTION, POWDER, FOR SOLUTION INTRAMUSCULAR; INTRAVENOUS
Refills: 0 | Status: DISCONTINUED | OUTPATIENT
Start: 2020-09-11 | End: 2020-09-14

## 2020-09-11 RX ORDER — DEXTROSE 50 % IN WATER 50 %
25 SYRINGE (ML) INTRAVENOUS ONCE
Refills: 0 | Status: COMPLETED | OUTPATIENT
Start: 2020-09-11 | End: 2020-09-11

## 2020-09-11 RX ADMIN — Medication 25 MILLIGRAM(S): at 21:57

## 2020-09-11 RX ADMIN — Medication 1 PATCH: at 11:39

## 2020-09-11 RX ADMIN — Medication 25 GRAM(S): at 08:58

## 2020-09-11 RX ADMIN — Medication 1 PATCH: at 01:02

## 2020-09-11 RX ADMIN — CEFTRIAXONE 1000 MILLIGRAM(S): 500 INJECTION, POWDER, FOR SOLUTION INTRAMUSCULAR; INTRAVENOUS at 11:38

## 2020-09-11 RX ADMIN — Medication 2.5 MILLIGRAM(S): at 12:38

## 2020-09-11 NOTE — PROGRESS NOTE ADULT - SUBJECTIVE AND OBJECTIVE BOX
HPI: Pt seen and examined this am in follow up for sx. Pt sleepy, again noting not a morning person, talking in her sleep when not engaged. However, able to wake up and answer questions appropriately. As per RN, no issues and no lizeth of morphine since yesterday morning. Denied dyspnea, but also did not have O2 on, replaced.       PAIN: denies  DYSPNEA: denies      ROS:  Fatigue- yes  Weakness- yes    All other systems reviewed and negative      PHYSICAL EXAM:    Vital Signs Last 24 Hrs  T(C): 36.9 (10 Sep 2020 21:09), Max: 36.9 (10 Sep 2020 16:40)  T(F): 98.5 (10 Sep 2020 21:09), Max: 98.5 (10 Sep 2020 16:40)  HR: 92 (10 Sep 2020 21:09) (92 - 107)  BP: 116/51 (10 Sep 2020 21:09) (116/45 - 116/51)  RR: 20 (10 Sep 2020 21:09) (20 - 20)  SpO2: 98% (10 Sep 2020 21:09) (98% - 100%)  Daily Weight in k.8 (11 Sep 2020 06:34)    PPSV2: 30  %    General: Elderly female lying in bed, NAD  Mental Status: AOx4  HEENT: mmm, +temporal wasting  Lungs: dec at bases bl  Cardiac: + s1 s2 rrr  GI: soft nt nd +bs, diaper in place  : voids  Skin/msk: moves all extremities, pitting dependent edema bl le to ankles, muscle and fat wasting throughout  Neuro: no focal deficits    LABS:                        13.6   15.27 )-----------( 227      ( 11 Sep 2020 08:02 )             42.5         137  |  103  |  60<H>  ----------------------------<  40<LL>  5.3   |  21<L>  |  1.72<H>    Ca    9.2      11 Sep 2020 08:02      PT/INR - ( 11 Sep 2020 08:02 )   PT: 29.5 sec;   INR: 2.66 ratio           Albumin: Albumin, Serum: 3.1 g/dL ( @ 08:02)      Allergies    No Known Allergies    Intolerances      MEDICATIONS  (STANDING):  cefTRIAXone Injectable. 1000 milliGRAM(s) IV Push once  cefTRIAXone Injectable.      furosemide    Tablet 20 milliGRAM(s) Oral daily  influenza   Vaccine 0.5 milliLiter(s) IntraMuscular once  levothyroxine 100 MICROGram(s) Oral daily  metoprolol tartrate 25 milliGRAM(s) Oral two times a day  nitroglycerin    Patch 0.2 mG/Hr(s) 1 patch Transdermal daily    MEDICATIONS  (PRN):  acetaminophen   Tablet .. 650 milliGRAM(s) Oral every 6 hours PRN Mild Pain (1 - 3)  morphine  - Injectable 1 milliGRAM(s) IV Push every 6 hours PRN breakthrough dyspnea  morphine Concentrate 2.5 milliGRAM(s) Oral every 6 hours PRN dyspnea  ondansetron Injectable 4 milliGRAM(s) IV Push every 6 hours PRN Nausea and/or Vomiting      RADIOLOGY:

## 2020-09-11 NOTE — PROGRESS NOTE ADULT - ASSESSMENT
88 yo female with   PMH of a. fib on eliquis, h/o CVA, CHF, Severe AS and MR  HTN, hypothyroidism with:    #LULA - creatinine spiked to 1.72  suspect UTI  pt. started on lasix - will hold, also dehydrated   - check UA and UCx   - hold lasix and morphine  - cannot give fluids due to B/L pleural effusions/valvular disease/PAH  - avoid nephrotoxic drugs  - monitor creatinine    # Leukocytosis - WBC spiked to 15.27  also increased in creatinine, suspect UTI, also possible aspiration PNA  - check UA and UCx   - start Rocephin  - monitor WBC     # Change in mental status - suspect hypoxic/hypercapnic respiratory failure  - pt. lethargic, barely opening eyes  - ABG stat     # S/p Fall,  likely mechanical   No visible signs of trauma, but Pt refused Xrays   Did not  comply with PT eval, but was able to get up for orthostatics check   Orthostatics neg   Fall precautions   C/w tele: Paced, underline AFIB     # Hyperkalemia with LULA/CKD stage II, resolved   S/p Insulin, albuterol, Kayexalate  K improved this am  Renal Fx improving , s/p 1L IVF Bolus   Avoid nephrotoxic meds   Renal sono: no obstruction     # Dyspnea,  Acute on    Chronic diastolic CHF 2/2 valvular Dz, has severe MR and AS   Pulse ox stable with 2l O2  CXR done with B/l Pulm congestion R>L   CT chest on 9/10 shows Bilateral pleural effusions, right more than left, cardiomegaly and question of pulmonary edema.  Anasarca, enlarged main pulmonary artery   BNP 2500  Hold lasix for now due to LULA   hold morphine due to LULA    weight daily   Last ECHO: EF 50%   D/w Dr Ghosh, due to advanced valvular Dz will be difficult to keep Pt euvolemic, palliative care and possible hospice recommended   however pt. refused     # B/L pleural effusion right>left  - hold eliquis   - CT surgery consult appreciated: [place] when INR<2, repeat INR today 9/11 is 3.33   - Pulmonary consult - Dr. Goldsmith - appreciated     # Elevated troponin   No chest pain,   ECG: AFIB with ST and T wave changes, prolonged QT, suspect related to electrolyte abnormality   repeat EKG in am   Start  ASA, c/w metoprolol  CArdio eval appreciated, no further work up at this time     # Severe MR and AS  Pt was evaluated in the past by Dr José for possible Sx, but declined   Monitor closely   Palliative eval recommended by cardio     #  Hypothyroidism   with low TSH   Decrease Synthroid to 100mcg  will repeat labs in am     #  Chronic AFIB  clarification: no PPM   C/w A/c on low dose eliquis  - will hold for throacentesis tomorrow   C/w metoprolol     # Severe protein calorie malnutrition   supplements  Supportive care     NP spoke with pt's son  - 9/11 - and explained pt's change in condition   Np also updated Dr. Barba - as pt. had no capacity at this time     Goals of care conversation  Palliative consult appreciated: poor prognosis,  in setting of advanced age, pt. refused hospice

## 2020-09-11 NOTE — CONSULT NOTE ADULT - SUBJECTIVE AND OBJECTIVE BOX
HPI:  CC:  Patient is a 89y old  Female who presents with a chief complaint of fall.    HPI:  89F.  admitted 09/07/20.  presented from home to ED via EMS.  c/o fall.  reports she, "tripped over [her] own too feet."  denied LOC.  she has very poor recollection of events.  states she was laying on the floor for several hours before EMS arrived.      recently, patient was hospitalized at  from 08/13-08/15 due to mild exacerbation of HFpEF.  among her DC medications were potassium 20mEq po bid and Lasix 40mg po qd.    in ED, potassium was found to be elevated (6.8).  R insulin 4 units, CLARA MDI and Kayexalate were administered.  CPK was wnl.  furthermore, TnI was mildly elevated (0.063).  patient refusing further imaging (CXR as well as b/l hip and pelvis xray).      ROS:    (-) pyuria.    all other review of systems are negative unless indicated above.    PAST MEDICAL & SURGICAL HISTORY:  Afib  CVA (cerebral vascular accident)  Hypothyroidism  Cerebrovascular accident (CVA)  Atrial fibrillation  Essential hypertension  Chronic multifocal osteomyelitis, other site  Scarlet fever  S/P debridement      Allergies    No Known Allergies    Intolerances        Home Medications:  Eliquis 2.5 mg oral tablet: 1 tab(s) orally 2 times a day (13 Aug 2020 02:46)  furosemide 40 mg oral tablet: 1 tab(s) orally once a day (13 Aug 2020 02:46)  Klor-Con 10 mEq oral tablet, extended release: 2 tab(s) orally 2 times a day (13 Aug 2020 02:46)  metoprolol tartrate 25 mg oral tablet: 1 tab(s) orally 2 times a day (13 Aug 2020 02:46)  Synthroid 112 mcg (0.112 mg) oral tablet: 1 tab(s) orally once a day (13 Aug 2020 02:46)      Social History:  lives at home and is assisted by a HHA.    FAMILY HISTORY:  Family history of heart disease (Sibling)      Vital Signs Last 24 Hrs  T(C): 36.4 (07 Sep 2020 05:51), Max: 36.4 (07 Sep 2020 05:51)  T(F): 97.6 (07 Sep 2020 05:51), Max: 97.6 (07 Sep 2020 05:51)  HR: 92 (07 Sep 2020 07:48) (92 - 100)  BP: 105/62 (07 Sep 2020 07:48) (105/62 - 119/52)  BP(mean): --  RR: 21 (07 Sep 2020 07:48) (17 - 21)  SpO2: 100% (07 Sep 2020 07:48) (98% - 100%)    Constitutional: NAD.   HEENT: PERRL, EOMI, MMM.  Neck: Soft and supple, No carotid bruit, No JVD  Respiratory: Breath sounds are clear bilaterally, No wheezing, rales or rhonchi  Cardiovascular: S1 and S2, regular rate and rhythm, no murmur, rub or gallop.  Gastrointestinal: Bowel Sounds present, soft, nontender, nondistended, no guarding, no rebound, no mass.  Extremities: (+) LE edema  Vascular: 2+ peripheral pulses  Neurological: A/O x 3, no focal deficits  Musculoskeletal: 5/5 strength b/l upper and lower extremities  Skin:  no visible rashes.                             12.4   11.88 )-----------( 254      ( 07 Sep 2020 06:25 )             39.5           09-07    136  |  104  |  53<H>  ----------------------------<  89  6.8<HH>   |  29  |  1.68<H>    Ca    9.5      07 Sep 2020 06:25            CARDIAC MARKERS ( 07 Sep 2020 06:25 )  0.063 ng/mL / x     / 72 U/L / x     / x        EKG (preliminary):  AF.  inferolateral abnormalities appear grossly unchanged as compared to 08/13/20 tracing.    < from: TTE Echo Complete w/o Contrast w/ Doppler (08.13.20 @ 09:52) >  Impression     Summary     Severe aortic stenosis is present.   At least moderate (2+) eccentric aortic regurgitation is present.   The POLLY by continuity equation is .47 cm2   The dimensionless index is .15   The left atrium is severely dilated.   Mild concentric left ventricular hypertrophy is present.   Estimated left ventricular ejection fraction is 50 %.   The IVC is dilated.   Severe mitral stenosis is present.   Severe mitral annular calcification is present.   Severe (4+) eccentric mitral regurgitation is present.   No evidence of pericardial effusion.   Pleural effusion - right pleural effusion.   Mild to moderate pulmonic valvular regurgitation is present.   The right atrium appears moderately dilated.   The right ventricle is not always well visualized, appears grossly normal   in size.   The tricuspid valve leaflets appear mildly thickened and/or calcified, but   open well.   Mild to Moderate Tricuspid regurgitation is present.   Moderate pulmonary hypertension.     Signature     ----------------------------------------------------------------   Electronically signed by Diamond Lewis MD(Interpreting   physician) on 08/13/2020 06:33 PM    < end of copied text > (07 Sep 2020 10:01)      PAST MEDICAL & SURGICAL HISTORY:  Afib  CVA (cerebral vascular accident)  Hypothyroidism  Cerebrovascular accident (CVA)  Atrial fibrillation  Essential hypertension  Chronic multifocal osteomyelitis, other site  Scarlet fever  S/P debridement      Home Medications:  Eliquis 2.5 mg oral tablet: 1 tab(s) orally 2 times a day (13 Aug 2020 02:46)  furosemide 40 mg oral tablet: 1 tab(s) orally once a day (13 Aug 2020 02:46)  Klor-Con 10 mEq oral tablet, extended release: 2 tab(s) orally 2 times a day (13 Aug 2020 02:46)  metoprolol tartrate 25 mg oral tablet: 1 tab(s) orally 2 times a day (13 Aug 2020 02:46)  Synthroid 112 mcg (0.112 mg) oral tablet: 1 tab(s) orally once a day (13 Aug 2020 02:46)      MEDICATIONS  (STANDING):  furosemide    Tablet 20 milliGRAM(s) Oral daily  heparin   Injectable 5000 Unit(s) SubCutaneous every 12 hours  influenza   Vaccine 0.5 milliLiter(s) IntraMuscular once  levothyroxine 100 MICROGram(s) Oral daily  metoprolol tartrate 25 milliGRAM(s) Oral two times a day  nitroglycerin    Patch 0.2 mG/Hr(s) 1 patch Transdermal daily    MEDICATIONS  (PRN):  acetaminophen   Tablet .. 650 milliGRAM(s) Oral every 6 hours PRN Mild Pain (1 - 3)  morphine  - Injectable 1 milliGRAM(s) IV Push every 6 hours PRN breakthrough dyspnea  morphine Concentrate 2.5 milliGRAM(s) Oral every 6 hours PRN dyspnea  ondansetron Injectable 4 milliGRAM(s) IV Push every 6 hours PRN Nausea and/or Vomiting      Allergies    No Known Allergies    Intolerances        SOCIAL HISTORY: Denies tobacco, etoh abuse or illicit drug use    FAMILY HISTORY:  Family history of heart disease (Sibling)      Vital Signs Last 24 Hrs  T(C): 36.9 (10 Sep 2020 21:09), Max: 36.9 (10 Sep 2020 16:40)  T(F): 98.5 (10 Sep 2020 21:09), Max: 98.5 (10 Sep 2020 16:40)  HR: 92 (10 Sep 2020 21:09) (68 - 107)  BP: 116/51 (10 Sep 2020 21:09) (114/58 - 116/51)  BP(mean): --  RR: 20 (10 Sep 2020 21:09) (20 - 20)  SpO2: 98% (10 Sep 2020 21:09) (98% - 100%)        REVIEW OF SYSTEMS:    CONSTITUTIONAL:  As per HPI.  HEENT:  Eyes:  No diplopia or blurred vision. ENT:  No earache, sore throat or runny nose.  CARDIOVASCULAR:  No pressure, squeezing, tightness, heaviness or aching about the chest, neck, axilla or epigastrium.  RESPIRATORY:  No cough, shortness of breath, PND or orthopnea.  GASTROINTESTINAL:  No nausea, vomiting or diarrhea.  GENITOURINARY:  No dysuria, frequency or urgency.  MUSCULOSKELETAL:  As per HPI.  SKIN:  No change in skin, hair or nails.  NEUROLOGIC:  No paresthesias, fasciculations, seizures or weakness.  PSYCHIATRIC:  No disorder of thought or mood.  ENDOCRINE:  No heat or cold intolerance, polyuria or polydipsia.  HEMATOLOGICAL:  No easy bruising or bleedings:  .     PHYSICAL EXAMINATION:    GENERAL APPEARANCE:  Pt. is not currently dyspneic, in no distress. Pt. is alert, oriented, and pleasant.  HEENT:  Pupils are normal and react normally. No icterus. Mucous membranes well colored.  NECK:  Supple. No lymphadenopathy. Jugular venous pressure not elevated. Carotids equal.   HEART:   The cardiac impulse has a normal quality. Regular. Normal S1 and S2. There are no murmurs, rubs or gallops noted  CHEST:  Chest is clear to auscultation. Normal respiratory effort.  ABDOMEN:  Soft and nontender.   EXTREMITIES:  There is no cyanosis, clubbing or edema.   SKIN:  No rash or significant lesions are noted.    LABS:                        13.6   15.27 )-----------( 227      ( 11 Sep 2020 08:02 )             42.5             PT/INR - ( 11 Sep 2020 08:02 )   PT: 29.5 sec;   INR: 2.66 ratio                       RADIOLOGY & ADDITIONAL STUDIES: HPI:    89 y.o.f admitted s/p fall, she, "tripped over [her] own too feet."  denied LOC.  she has very poor recollection of events.  states she was laying on the floor for several hours before EMS arrived. Recently patient was hospitalized at  from 08/13-08/15 due to mild exacerbation of HFpEF.  among her DC medications were potassium 20mEq po bid and Lasix 40mg po qd. In ED, potassium was found to be elevated (6.8).  R insulin 4 units, CLARA MDI and Kayexalate were administered.  CPK was wnl. pat had CT chest which shows b/l pleural effusion, pt was asked to see for pulmonary eval, pat was on eliquis which is on hold, INR 2.6, pat low BS early on 40, required D50 & now BS above 200, pat with variable mental status with normal with confusion since discharge.    PAST MEDICAL & SURGICAL HISTORY:  Afib  CVA (cerebral vascular accident)  Hypothyroidism  Cerebrovascular accident (CVA)  Atrial fibrillation  Essential hypertension  Chronic multifocal osteomyelitis, other site  Scarlet fever  S/P debridement      Allergies    No Known Allergies    Intolerances        Home Medications:  Eliquis 2.5 mg oral tablet: 1 tab(s) orally 2 times a day (13 Aug 2020 02:46)  furosemide 40 mg oral tablet: 1 tab(s) orally once a day (13 Aug 2020 02:46)  Klor-Con 10 mEq oral tablet, extended release: 2 tab(s) orally 2 times a day (13 Aug 2020 02:46)  metoprolol tartrate 25 mg oral tablet: 1 tab(s) orally 2 times a day (13 Aug 2020 02:46)  Synthroid 112 mcg (0.112 mg) oral tablet: 1 tab(s) orally once a day (13 Aug 2020 02:46)      Social History:  lives at home and is assisted by a A.    FAMILY HISTORY:  Family history of heart disease (Sibling)      Vital Signs Last 24 Hrs  T(C): 36.4 (07 Sep 2020 05:51), Max: 36.4 (07 Sep 2020 05:51)  T(F): 97.6 (07 Sep 2020 05:51), Max: 97.6 (07 Sep 2020 05:51)  HR: 92 (07 Sep 2020 07:48) (92 - 100)  BP: 105/62 (07 Sep 2020 07:48) (105/62 - 119/52)  BP(mean): --  RR: 21 (07 Sep 2020 07:48) (17 - 21)  SpO2: 100% (07 Sep 2020 07:48) (98% - 100%)    Constitutional: NAD.   HEENT: PERRL, EOMI, MMM.  Neck: Soft and supple, No carotid bruit, No JVD  Respiratory: Breath sounds are clear bilaterally, No wheezing, rales or rhonchi  Cardiovascular: S1 and S2, regular rate and rhythm, no murmur, rub or gallop.  Gastrointestinal: Bowel Sounds present, soft, nontender, nondistended, no guarding, no rebound, no mass.  Extremities: (+) LE edema  Vascular: 2+ peripheral pulses  Neurological: A/O x 3, no focal deficits  Musculoskeletal: 5/5 strength b/l upper and lower extremities  Skin:  no visible rashes.                             12.4   11.88 )-----------( 254      ( 07 Sep 2020 06:25 )             39.5           09-07    136  |  104  |  53<H>  ----------------------------<  89  6.8<HH>   |  29  |  1.68<H>    Ca    9.5      07 Sep 2020 06:25            CARDIAC MARKERS ( 07 Sep 2020 06:25 )  0.063 ng/mL / x     / 72 U/L / x     / x        EKG (preliminary):  AF.  inferolateral abnormalities appear grossly unchanged as compared to 08/13/20 tracing.    < from: TTE Echo Complete w/o Contrast w/ Doppler (08.13.20 @ 09:52) >  Impression     Summary     Severe aortic stenosis is present.   At least moderate (2+) eccentric aortic regurgitation is present.   The POLLY by continuity equation is .47 cm2   The dimensionless index is .15   The left atrium is severely dilated.   Mild concentric left ventricular hypertrophy is present.   Estimated left ventricular ejection fraction is 50 %.   The IVC is dilated.   Severe mitral stenosis is present.   Severe mitral annular calcification is present.   Severe (4+) eccentric mitral regurgitation is present.   No evidence of pericardial effusion.   Pleural effusion - right pleural effusion.   Mild to moderate pulmonic valvular regurgitation is present.   The right atrium appears moderately dilated.   The right ventricle is not always well visualized, appears grossly normal   in size.   The tricuspid valve leaflets appear mildly thickened and/or calcified, but   open well.   Mild to Moderate Tricuspid regurgitation is present.   Moderate pulmonary hypertension.     Signature     ----------------------------------------------------------------   Electronically signed by Diamond Lewis MD(Interpreting   physician) on 08/13/2020 06:33 PM    < end of copied text > (07 Sep 2020 10:01)      PAST MEDICAL & SURGICAL HISTORY:  Afib  CVA (cerebral vascular accident)  Hypothyroidism  Cerebrovascular accident (CVA)  Atrial fibrillation  Essential hypertension  Chronic multifocal osteomyelitis, other site  Scarlet fever  S/P debridement      Home Medications:  Eliquis 2.5 mg oral tablet: 1 tab(s) orally 2 times a day (13 Aug 2020 02:46)  furosemide 40 mg oral tablet: 1 tab(s) orally once a day (13 Aug 2020 02:46)  Klor-Con 10 mEq oral tablet, extended release: 2 tab(s) orally 2 times a day (13 Aug 2020 02:46)  metoprolol tartrate 25 mg oral tablet: 1 tab(s) orally 2 times a day (13 Aug 2020 02:46)  Synthroid 112 mcg (0.112 mg) oral tablet: 1 tab(s) orally once a day (13 Aug 2020 02:46)      MEDICATIONS  (STANDING):  furosemide    Tablet 20 milliGRAM(s) Oral daily  heparin   Injectable 5000 Unit(s) SubCutaneous every 12 hours  influenza   Vaccine 0.5 milliLiter(s) IntraMuscular once  levothyroxine 100 MICROGram(s) Oral daily  metoprolol tartrate 25 milliGRAM(s) Oral two times a day  nitroglycerin    Patch 0.2 mG/Hr(s) 1 patch Transdermal daily    MEDICATIONS  (PRN):  acetaminophen   Tablet .. 650 milliGRAM(s) Oral every 6 hours PRN Mild Pain (1 - 3)  morphine  - Injectable 1 milliGRAM(s) IV Push every 6 hours PRN breakthrough dyspnea  morphine Concentrate 2.5 milliGRAM(s) Oral every 6 hours PRN dyspnea  ondansetron Injectable 4 milliGRAM(s) IV Push every 6 hours PRN Nausea and/or Vomiting      Allergies    No Known Allergies    Intolerances        SOCIAL HISTORY: Denies tobacco, etoh abuse or illicit drug use    FAMILY HISTORY:  Family history of heart disease (Sibling)      Vital Signs Last 24 Hrs  T(C): 36.9 (10 Sep 2020 21:09), Max: 36.9 (10 Sep 2020 16:40)  T(F): 98.5 (10 Sep 2020 21:09), Max: 98.5 (10 Sep 2020 16:40)  HR: 92 (10 Sep 2020 21:09) (68 - 107)  BP: 116/51 (10 Sep 2020 21:09) (114/58 - 116/51)  BP(mean): --  RR: 20 (10 Sep 2020 21:09) (20 - 20)  SpO2: 98% (10 Sep 2020 21:09) (98% - 100%)        REVIEW OF SYSTEMS:    CONSTITUTIONAL:  As per HPI.  HEENT:  Eyes:  No diplopia or blurred vision. ENT:  No earache, sore throat or runny nose.  CARDIOVASCULAR:  No pressure, squeezing, tightness, heaviness or aching about the chest, neck, axilla or epigastrium.  RESPIRATORY:  No cough, shortness of breath, PND or orthopnea.  GASTROINTESTINAL:  No nausea, vomiting or diarrhea.  GENITOURINARY:  No dysuria, frequency or urgency.  MUSCULOSKELETAL:  As per HPI.  SKIN:  No change in skin, hair or nails.  NEUROLOGIC:  No paresthesias, fasciculations, seizures or weakness.  PSYCHIATRIC:  No disorder of thought or mood.  ENDOCRINE:  No heat or cold intolerance, polyuria or polydipsia.  HEMATOLOGICAL:  No easy bruising or bleedings:  .     PHYSICAL EXAMINATION:    GENERAL APPEARANCE:  Pt. is not currently dyspneic, in no distress. Pt. is alert, oriented, and pleasant.  HEENT:  Pupils are normal and react normally. No icterus. Mucous membranes well colored.  NECK:  Supple. No lymphadenopathy. Jugular venous pressure not elevated. Carotids equal.   HEART:   The cardiac impulse has a normal quality. Regular. Normal S1 and S2. There are no murmurs, rubs or gallops noted  CHEST:  Chest is clear to auscultation. Normal respiratory effort.  ABDOMEN:  Soft and nontender.   EXTREMITIES:  There is no cyanosis, clubbing or edema.   SKIN:  No rash or significant lesions are noted.    LABS:                        13.6   15.27 )-----------( 227      ( 11 Sep 2020 08:02 )             42.5     PT/INR - ( 11 Sep 2020 08:02 )   PT: 29.5 sec;   INR: 2.66 ratio       RADIOLOGY & ADDITIONAL STUDIES:     CT Chest No Cont (09.10.20 @ 12:04) >  IMPRESSION:  Bilateral pleural effusions, right more than left, cardiomegaly and question of pulmonary edema.    Anasarca, enlarged main pulmonary artery and additional findings as above.

## 2020-09-11 NOTE — PROGRESS NOTE ADULT - ASSESSMENT
89y old Female coming from home with hx of Afib on Eliquis, CVA, CHF, Severe AS and MR  HTN, hypothyroidism, admitted 9/7 for mechanical fall, and as per notes pt was laying on the floor for several hours before EMS arrived. Of note pt recently hospitalized at  from 08/13-08/15 due to mild exacerbation of HFpEF; among her DC medications were potassium 20mEq po bid and Lasix 40mg po qd. In ED, potassium was found to be elevated (6.8), LULA (resolved with IVF), mildly elevated trops (2/2 to demand), and patient refused  further imaging (CXR as well as b/l hip and pelvis xray), dyspnea noted due to CHF. Palliative Care consulted to establish GOC- pt known to our service from last admission.      1) Dyspnea/CHF  - CHF with valvular disease  - cardio notes appreciated- class II HF, not a candidate for repair, rec to consider hospice (Pt not ready for that option)  - as per primary team imaging showing effusion and plan for CT to consider tap for palliative purposes  - c/w supplemental O2  - on lasix as per team  - added low dose roxanol 2.5 mg yesterday, which pt tolerated and open to trying again-->[id number]--> no doses since 9am yesterday  - a/w keeping IV dose available if needed  - wbc also rising (not on steroids)    2) Debility/Fall  - PPS<40% at baseline  - 7d/week of aids for ADLs  - PT note appreciated recommended [facility] vs. 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 Pt not on board with this plan at this time.     5) GOC/Advanced Directives  - pt has capacity for decision making  - no HCP noted, son would be surrogate decision maker (Riccardo) but pt does not want him called  - MOLST- DNR and DNI (9/7/20)  - GOC conversation held- see consult note for details- wants home with pall, not ready for hospice    Thank you for including us in Ms. Sandoval's care. Will continue to follow with you.    Shama Barba MD  Palliative Care Attending

## 2020-09-11 NOTE — PROGRESS NOTE ADULT - SUBJECTIVE AND OBJECTIVE BOX
CC: shortness of breath      HPI: 89 y.o female with   PMH of monique mathur on eliquis, h/o CVA, CHF, Severe AS and MR  HTN, hypothyroidism, presented from home to ED via EMS s/p fall. Pt   reports she  "tripped over [her] own two feet."  , she was walking to the bathroom. Denied LOC, denies CP or dizziness. Unable to obtain more details.  She was laying on the floor for several hours before EMS arrived.    Patient was hospitalized at  from -08/15 due to mild exacerbation of HFpEF.  among her DC medications were potassium 20mEq po bid and Lasix 40mg po qd.  in ED, potassium was found to be elevated (6.8).  Tx with insulin 4 units, CLARA MDI and Kayexalate were administered.  CPK was wnl.  , TnI was mildly elevated (0.063).  patient refused  further imaging (CXR as well as b/l hip and pelvis xray).    Had 1L of IVF     INTERVAL HPI/ OVERNIGHT EVENTS:  Pt was seen and  examined, lethargic, sleepy, does not want to speak at all, does not answer questions     REVIEW OF SYSTEMS:  All other review of systems is negative unless indicated above    Vital Signs Last 24 Hrs  T(C): 36.9 (10 Sep 2020 21:09), Max: 36.9 (10 Sep 2020 16:40)  T(F): 98.5 (10 Sep 2020 21:09), Max: 98.5 (10 Sep 2020 16:40)  HR: 86 (11 Sep 2020 11:35) (86 - 107)  BP: 114/50 (11 Sep 2020 11:35) (114/50 - 116/51)  BP(mean): --  RR: 20 (10 Sep 2020 21:09) (20 - 20)  SpO2: 98% (10 Sep 2020 21:09) (98% - 100%)    I&O's Summary    10 Sep 2020 07:01  -  11 Sep 2020 07:00  --------------------------------------------------------  IN: 0 mL / OUT: 350 mL / NET: -350 mL        CAPILLARY BLOOD GLUCOSE      POCT Blood Glucose.: 137 mg/dL (11 Sep 2020 12:26)  POCT Blood Glucose.: 247 mg/dL (11 Sep 2020 09:08)      PHYSICAL EXAM:  General: Well developed; malnourished, frail elderly female,  dyspneic, on NC   Eyes: PERRLA, EOMI; conjunctiva and sclera clear  Head: Normocephalic; atraumatic  ENMT: No nasal discharge; airway clear  Neck: Supple; non tender; no masses  Respiratory: Diminished BS at bases b/l.  B/l   rales   Cardiovascular: Irregular rate and rhythm. S1 and S2 Normal; +  murmur  Gastrointestinal: Soft non-tender non-distended; Normal bowel sounds  Genitourinary: No  suprapubic  tenderness  Extremities: + le edema, venous stasis changes   Neurological: Alert and oriented x0, lethargic   Skin: Warm and dry. No acute rash  Lymph Nodes: No acute cervical adenopathy    Medications:  MEDICATIONS  (STANDING):  cefTRIAXone Injectable.      furosemide    Tablet 20 milliGRAM(s) Oral daily  influenza   Vaccine 0.5 milliLiter(s) IntraMuscular once  levothyroxine 100 MICROGram(s) Oral daily  metoprolol tartrate 25 milliGRAM(s) Oral two times a day  nitroglycerin    Patch 0.2 mG/Hr(s) 1 patch Transdermal daily      Labs: All Labs Reviewed:                        13.6   15.27 )-----------( 227      ( 11 Sep 2020 08:02 )             42.5     09-    137  |  103  |  60<H>  ----------------------------<  40<LL>  5.3   |  21<L>  |  1.72<H>    Ca    9.2      11 Sep 2020 08:02      PT/INR - ( 11 Sep 2020 14:23 )   PT: 36.5 sec;   INR: 3.33 ratio           Urinalysis Basic - ( 11 Sep 2020 11:30 )    Color: Yellow / Appearance: Slightly Turbid / S.025 / pH: x  Gluc: x / Ketone: Trace  / Bili: Small / Urobili: 1 mg/dL   Blood: x / Protein: 100 mg/dL / Nitrite: Negative   Leuk Esterase: Trace / RBC: 3-5 /HPF / WBC 3-5   Sq Epi: x / Non Sq Epi: Moderate / Bacteria: Negative    Blood Culture:  @ 08:29  Organism --  Gram Stain Blood -- Gram Stain --  Specimen Source .Urine None  Culture-Blood --    >=3 organisms. Probable collection contamination.    Urine Culture: Organism: --  gram stain: --   @ 08:29     >=3 organisms. Probable collection contamination.      RADIOLOGY/EKG: all tests were reviewed   < from: CT Chest No Cont (09.10.20 @ 12:04) >    EXAM:  CT CHEST                            PROCEDURE DATE:  09/10/2020          INTERPRETATION:  CLINICAL INFORMATION: Pleural effusion    COMPARISON: 2018.    PROCEDURE:  CT of the Chest was performed without intravenous contrast.  Sagittal and coronal reformats were performed.    FINDINGS:    LUNGS AND AIRWAYS: Patent central airways.  Compression atelectasis dependent lower lobes. A few septal lines and ill-defined groundglass opacity concerning for pulmonary edema.  PLEURA: Moderatelylarge right and a small left pleural effusion.  MEDIASTINUM AND DONALDO: No lymphadenopathy.  VESSELS: Coronary artery calcification. Enlarged main pulmonary artery measuring 4.2 cm. Correlate for pulmonary arterial hypertension.  HEART: Cardiomegaly. Aortic valve and mitral annulus calcification. No significant pericardial effusion.  CHEST WALL AND LOWER NECK: Anasarca.  VISUALIZED UPPER ABDOMEN: Grossly unremarkable.  BONES: Within normal limits.    IMPRESSION:  Bilateral pleural effusions, right more than left, cardiomegaly and question of pulmonary edema.    Anasarca, enlarged main pulmonary artery and additional findings as above.    < end of copied text >    < from: TTE Echo Complete w/o Contrast w/ Doppler (20 @ 09:52) >   Impression     Summary     Severe aortic stenosis is present.   At least moderate (2+) eccentric aortic regurgitation is present.   The POLLY by continuity equation is .47 cm2   The dimensionless index is .15   The left atrium is severely dilated.   Mild concentric left ventricular hypertrophy is present.   Estimated left ventricular ejection fraction is 50 %.   The IVC is dilated.   Severe mitral stenosis is present.   Severe mitral annular calcification is present.   Severe (4+) eccentric mitral regurgitation is present.   No evidence of pericardial effusion.   Pleural effusion - right pleural effusion.   Mild to moderate pulmonic valvular regurgitation is present.    < end of copied text >      DVT PPX: eliquis on hold, heparin sq     Advance Directive: DNR/DNI     Disposition: ABG

## 2020-09-11 NOTE — CONSULT NOTE ADULT - ASSESSMENT
9y Female pmhx CVA, CHF, severe AS and MS, mod pulm HTN, AF on AC admitted 9/7 s/p fall at home. Pt w SOB, b/l effusions. CT chest today shows a large right effusion. Called for drainage.    Cont to hold AC  plan for drainage via pigtail 9/11 if pt agreeable  will call son to discuss  cont care as per medicine
PROBLEMS:    S/p Fall  likely mechanical   B/L pleural effusion right>lef  Acute on Chronic diastolic CHF 2/2 valvular Dz-severe MR and AS  Hyperkalemia with LULA/CKD stage II-resolved  Severe MR and AS  Hypothyroidism  Chronic AFIB  Severe protein calorie malnutrition     PLAN:    Keep neg balance  R thoracentesis  elevated INR-vit K   supportive care  CT surgery fu  dvt prophylasix
89y old Female coming from home with hx of Afib on Eliquis, CVA, CHF, Severe AS and MR  HTN, hypothyroidism, admitted 9/7 for mechanical fall, and as per notes pt was laying on the floor for several hours before EMS arrived. Of note pt recently hospitalized at  from 08/13-08/15 due to mild exacerbation of HFpEF; among her DC medications were potassium 20mEq po bid and Lasix 40mg po qd. In ED, potassium was found to be elevated (6.8), LULA (resolved with IVF), mildly elevated trops (2/2 to demand), and patient refused  further imaging (CXR as well as b/l hip and pelvis xray), dyspnea noted due to CHF. Palliative Care consulted to establish GOC- pt known to our service from last admission.      1) Dyspnea/CHF  - CHF with valvular disease  - cardio notes appreciated- class II HF, not a candidate for repair, rec to consider hospice  - c/w supplemental O2  - on lasix as per team  - added low dose roxanol 2.5 mg to try at bedtime (all she was willing to accept for now). Will follow up on efficacy and tolerance tomorrow if she tries this    2) Debility/Fall  - PPS<40% at baseline  - 7d/week of aids for ADLs  - PT note appreciated recommended [facility] vs. home with PT     3) LULA on CkD  - hyperkalemia resolved with medications  - Cr improved as well  - c/w monitoring     4) Prognosis  - poor  - in setting of advanced age, CHF with severe valvular disorder that pt is not candidate for, O2-dependance, increasing debility with fall, recurrent hospitalization, evidence of malnutrition on exam, PPS<40% with 7d/week of aid support for ADLs, would fit criteria for hospice. Pt not on board with this plan at this time.     5) GOC/Advanced Directives  - pt has capacity for decision making  - no HCP noted, son would be surrogate decision maker (Riccardo) but pt does not want him called  - MOLST- DNR and DNI (9/7/20)  - GOC conversation held- see above- wants home with pall, not ready for hospice    Thank you for including us in Ms. Sandoval's care. Will continue to follow with you.    Shama Barba MD  Palliative Care Attending

## 2020-09-11 NOTE — PROGRESS NOTE ADULT - SUBJECTIVE AND OBJECTIVE BOX
Subjective:  Pt seen, sleepy. On 3L NC. INR 2.66 today, eliquis held since last dose 9/9 pm.    Vital Signs:  Vital Signs Last 24 Hrs  T(C): 36.9 (09-10-20 @ 21:09), Max: 36.9 (09-10-20 @ 16:40)  T(F): 98.5 (09-10-20 @ 21:09), Max: 98.5 (09-10-20 @ 16:40)  HR: 86 (09-11-20 @ 11:35) (86 - 107)  BP: 114/50 (09-11-20 @ 11:35) (114/50 - 116/51)  RR: 20 (09-10-20 @ 21:09) (20 - 20)  SpO2: 98% (09-10-20 @ 21:09) (98% - 100%) on (O2)    Telemetry/Alarms:    Relevant labs, radiology and Medications reviewed                        13.6   15.27 )-----------( 227      ( 11 Sep 2020 08:02 )             42.5     09-11    137  |  103  |  60<H>  ----------------------------<  40<LL>  5.3   |  21<L>  |  1.72<H>    Ca    9.2      11 Sep 2020 08:02      PT/INR - ( 11 Sep 2020 08:02 )   PT: 29.5 sec;   INR: 2.66 ratio           MEDICATIONS  (STANDING):  cefTRIAXone Injectable.      furosemide    Tablet 20 milliGRAM(s) Oral daily  influenza   Vaccine 0.5 milliLiter(s) IntraMuscular once  levothyroxine 100 MICROGram(s) Oral daily  metoprolol tartrate 25 milliGRAM(s) Oral two times a day  nitroglycerin    Patch 0.2 mG/Hr(s) 1 patch Transdermal daily    MEDICATIONS  (PRN):  acetaminophen   Tablet .. 650 milliGRAM(s) Oral every 6 hours PRN Mild Pain (1 - 3)  morphine  - Injectable 1 milliGRAM(s) IV Push every 6 hours PRN breakthrough dyspnea  morphine Concentrate 2.5 milliGRAM(s) Oral every 6 hours PRN dyspnea  ondansetron Injectable 4 milliGRAM(s) IV Push every 6 hours PRN Nausea and/or Vomiting      Physical exam  General:NAD                                                         Neuro: arousable                   Chest: decreased BS on right, accessory muscle use noted  CV: RRR,   GI: soft, NT, ND,   Extremities: warm, edema    I&O's Summary    10 Sep 2020 07:01  -  11 Sep 2020 07:00  --------------------------------------------------------  IN: 0 mL / OUT: 350 mL / NET: -350 mL        Assessment  89y Female  w/ PAST MEDICAL & SURGICAL HISTORY:  Afib  CVA (cerebral vascular accident)  Hypothyroidism  Cerebrovascular accident (CVA)  Atrial fibrillation  Essential hypertension  Chronic multifocal osteomyelitis, other site  Scarlet fever  S/P debridement  admitted with complaints of Patient is a 89y old  Female who presents with a chief complaint of sob (11 Sep 2020 08:47)  .  89y Female pmhx CVA, CHF, severe AS and MS, mod pulm HTN, AF on AC admitted 9/7 s/p fall at home. Pt w SOB, b/l effusions. CT chest today shows a large right effusion. Called for drainage.     hold AC, medicine ordered vit K, will recheck INR, if less than 2 will place pigtail after consent obtained from son  repeat INR 3pm      Discussed with Cardiothoracic Team at AM rounds.

## 2020-09-12 DIAGNOSIS — R79.89 OTHER SPECIFIED ABNORMAL FINDINGS OF BLOOD CHEMISTRY: ICD-10-CM

## 2020-09-12 DIAGNOSIS — J90 PLEURAL EFFUSION, NOT ELSEWHERE CLASSIFIED: ICD-10-CM

## 2020-09-12 LAB
ALBUMIN SERPL ELPH-MCNC: 2.8 G/DL — LOW (ref 3.3–5)
ALP SERPL-CCNC: 77 U/L — SIGNIFICANT CHANGE UP (ref 40–120)
ALT FLD-CCNC: 1354 U/L — HIGH (ref 12–78)
ANION GAP SERPL CALC-SCNC: 9 MMOL/L — SIGNIFICANT CHANGE UP (ref 5–17)
AST SERPL-CCNC: 1184 U/L — HIGH (ref 15–37)
BILIRUB SERPL-MCNC: 1.9 MG/DL — HIGH (ref 0.2–1.2)
BUN SERPL-MCNC: 74 MG/DL — HIGH (ref 7–23)
CALCIUM SERPL-MCNC: 8.2 MG/DL — LOW (ref 8.5–10.1)
CHLORIDE SERPL-SCNC: 103 MMOL/L — SIGNIFICANT CHANGE UP (ref 96–108)
CO2 SERPL-SCNC: 25 MMOL/L — SIGNIFICANT CHANGE UP (ref 22–31)
CREAT SERPL-MCNC: 1.97 MG/DL — HIGH (ref 0.5–1.3)
CULTURE RESULTS: NO GROWTH — SIGNIFICANT CHANGE UP
GLUCOSE SERPL-MCNC: 97 MG/DL — SIGNIFICANT CHANGE UP (ref 70–99)
HCT VFR BLD CALC: 39.8 % — SIGNIFICANT CHANGE UP (ref 34.5–45)
HGB BLD-MCNC: 12.6 G/DL — SIGNIFICANT CHANGE UP (ref 11.5–15.5)
INR BLD: 2.21 RATIO — HIGH (ref 0.88–1.16)
MCHC RBC-ENTMCNC: 31.7 GM/DL — LOW (ref 32–36)
MCHC RBC-ENTMCNC: 32.1 PG — SIGNIFICANT CHANGE UP (ref 27–34)
MCV RBC AUTO: 101.5 FL — HIGH (ref 80–100)
PLATELET # BLD AUTO: 189 K/UL — SIGNIFICANT CHANGE UP (ref 150–400)
POTASSIUM SERPL-MCNC: 4.3 MMOL/L — SIGNIFICANT CHANGE UP (ref 3.5–5.3)
POTASSIUM SERPL-SCNC: 4.3 MMOL/L — SIGNIFICANT CHANGE UP (ref 3.5–5.3)
PROT SERPL-MCNC: 5.7 GM/DL — LOW (ref 6–8.3)
PROTHROM AB SERPL-ACNC: 24.9 SEC — HIGH (ref 10.6–13.6)
RBC # BLD: 3.92 M/UL — SIGNIFICANT CHANGE UP (ref 3.8–5.2)
RBC # FLD: 14.8 % — HIGH (ref 10.3–14.5)
SODIUM SERPL-SCNC: 137 MMOL/L — SIGNIFICANT CHANGE UP (ref 135–145)
SPECIMEN SOURCE: SIGNIFICANT CHANGE UP
WBC # BLD: 15.63 K/UL — HIGH (ref 3.8–10.5)
WBC # FLD AUTO: 15.63 K/UL — HIGH (ref 3.8–10.5)

## 2020-09-12 PROCEDURE — 99233 SBSQ HOSP IP/OBS HIGH 50: CPT

## 2020-09-12 PROCEDURE — 99232 SBSQ HOSP IP/OBS MODERATE 35: CPT

## 2020-09-12 RX ORDER — MORPHINE SULFATE 50 MG/1
2.5 CAPSULE, EXTENDED RELEASE ORAL EVERY 6 HOURS
Refills: 0 | Status: DISCONTINUED | OUTPATIENT
Start: 2020-09-12 | End: 2020-09-14

## 2020-09-12 RX ORDER — ASPIRIN/CALCIUM CARB/MAGNESIUM 324 MG
81 TABLET ORAL DAILY
Refills: 0 | Status: DISCONTINUED | OUTPATIENT
Start: 2020-09-12 | End: 2020-09-14

## 2020-09-12 RX ORDER — MORPHINE SULFATE 50 MG/1
2.5 CAPSULE, EXTENDED RELEASE ORAL DAILY
Refills: 0 | Status: DISCONTINUED | OUTPATIENT
Start: 2020-09-12 | End: 2020-09-12

## 2020-09-12 RX ADMIN — MORPHINE SULFATE 2.5 MILLIGRAM(S): 50 CAPSULE, EXTENDED RELEASE ORAL at 14:43

## 2020-09-12 RX ADMIN — Medication 1 PATCH: at 22:52

## 2020-09-12 RX ADMIN — Medication 25 MILLIGRAM(S): at 10:16

## 2020-09-12 RX ADMIN — Medication 1 PATCH: at 18:05

## 2020-09-12 RX ADMIN — MORPHINE SULFATE 2.5 MILLIGRAM(S): 50 CAPSULE, EXTENDED RELEASE ORAL at 13:49

## 2020-09-12 RX ADMIN — Medication 20 MILLIGRAM(S): at 10:16

## 2020-09-12 RX ADMIN — Medication 1 PATCH: at 10:17

## 2020-09-12 RX ADMIN — CEFTRIAXONE 1000 MILLIGRAM(S): 500 INJECTION, POWDER, FOR SOLUTION INTRAMUSCULAR; INTRAVENOUS at 10:18

## 2020-09-12 NOTE — PROGRESS NOTE ADULT - SUBJECTIVE AND OBJECTIVE BOX
CC: shortness of breath      HPI: 89 y.o female with   PMH of moniqeu mathur on eliquis, h/o CVA, CHF, Severe AS and MR  HTN, hypothyroidism, presented from home to ED via EMS s/p fall. Pt   reports she  "tripped over [her] own two feet."  , she was walking to the bathroom. Denied LOC, denies CP or dizziness. Unable to obtain more details.  She was laying on the floor for several hours before EMS arrived.    Patient was hospitalized at  from -08/15 due to mild exacerbation of HFpEF.  among her DC medications were potassium 20mEq po bid and Lasix 40mg po qd.  in ED, potassium was found to be elevated (6.8).  Tx with insulin 4 units, CLARA MDI and Kayexalate were administered.  CPK was wnl.  , TnI was mildly elevated (0.063).  patient refused  further imaging (CXR as well as b/l hip and pelvis xray).    Had 1L of IVF     INTERVAL HPI/ OVERNIGHT EVENTS:  Pt was seen and  examined,  more alert and awake, able to drink herself, but refusing to eat.  Attempted to discuss plan with Ct Sx at bedside,  Pt was asking questions about possible pain, stated " I wont b able to sit up".     On qs about her breathing she states  " my breathing is awgul" Pt though looks comfortable at  rest on NC. VS stable     REVIEW OF SYSTEMS:  All other review of systems is negative unless indicated above    Vital Signs Last 24 Hrs  T(C): 36.3 (12 Sep 2020 18:50), Max: 36.3 (12 Sep 2020 18:50)  T(F): 97.4 (12 Sep 2020 18:50), Max: 97.4 (12 Sep 2020 18:50)  HR: 97 (12 Sep 2020 18:50) (97 - 97)  BP: 112/53 (12 Sep 2020 18:50) (112/53 - 112/53)  SpO2: 94% (12 Sep 2020 18:50) (94% - 94%)      CAPILLARY BLOOD GLUCOSE  POCT Blood Glucose.: 137 mg/dL (11 Sep 2020 12:26)  POCT Blood Glucose.: 247 mg/dL (11 Sep 2020 09:08)      PHYSICAL EXAM:  General: Well developed; malnourished, frail elderly female,  dyspneic on  conversation  Eyes: PERRLA, EOMI; conjunctiva and sclera clear  Head: Normocephalic; atraumatic  ENMT: No nasal discharge; airway clear  Neck: Supple; non tender; no masses  Respiratory: Diminished BS at bases b/l. No  rales   Cardiovascular: Irregular rate and rhythm. S1 and S2 Normal; +  murmur  Gastrointestinal: Soft non-tender non-distended; Normal bowel sounds  Genitourinary: No  suprapubic  tenderness  Extremities: + le edema, venous stasis changes   Neurological: Alert and oriented x 2, non focal  Skin: Warm and dry. No acute rash  Lymph Nodes: No acute cervical adenopathy    MEDICATIONS  (STANDING):  aspirin enteric coated 81 milliGRAM(s) Oral daily  cefTRIAXone Injectable.      influenza   Vaccine 0.5 milliLiter(s) IntraMuscular once  levothyroxine 100 MICROGram(s) Oral daily  metoprolol tartrate 25 milliGRAM(s) Oral two times a day  nitroglycerin    Patch 0.2 mG/Hr(s) 1 patch Transdermal daily    MEDICATIONS  (PRN):  acetaminophen   Tablet .. 650 milliGRAM(s) Oral every 6 hours PRN Mild Pain (1 - 3)  morphine Concentrate 2.5 milliGRAM(s) Oral every 6 hours PRN dyspnea  ondansetron Injectable 4 milliGRAM(s) IV Push every 6 hours PRN Nausea and/or Vomiting      Labs: All Labs Reviewed:                        13.6   15.27 )-----------( 227      ( 11 Sep 2020 08:02 )             42.5     09-11    137  |  103  |  60<H>  ----------------------------<  40<LL>  5.3   |  21<L>  |  1.72<H>    Ca    9.2      11 Sep 2020 08:02      PT/INR - ( 11 Sep 2020 14:23 )   PT: 36.5 sec;   INR: 3.33 ratio           Urinalysis Basic - ( 11 Sep 2020 11:30 )    Color: Yellow / Appearance: Slightly Turbid / S.025 / pH: x  Gluc: x / Ketone: Trace  / Bili: Small / Urobili: 1 mg/dL   Blood: x / Protein: 100 mg/dL / Nitrite: Negative   Leuk Esterase: Trace / RBC: 3-5 /HPF / WBC 3-5   Sq Epi: x / Non Sq Epi: Moderate / Bacteria: Negative    Blood Culture:  @ 08:29  Organism --  Gram Stain Blood -- Gram Stain --  Specimen Source .Urine None  Culture-Blood --    >=3 organisms. Probable collection contamination.        RADIOLOGY/EKG: all tests were reviewed   < from: CT Chest No Cont (09.10.20 @ 12:04) >    EXAM:  CT CHEST                            PROCEDURE DATE:  09/10/2020          INTERPRETATION:  CLINICAL INFORMATION: Pleural effusion    COMPARISON: 2018.    PROCEDURE:  CT of the Chest was performed without intravenous contrast.  Sagittal and coronal reformats were performed.    FINDINGS:    LUNGS AND AIRWAYS: Patent central airways.  Compression atelectasis dependent lower lobes. A few septal lines and ill-defined groundglass opacity concerning for pulmonary edema.  PLEURA: Moderatelylarge right and a small left pleural effusion.  MEDIASTINUM AND DONALDO: No lymphadenopathy.  VESSELS: Coronary artery calcification. Enlarged main pulmonary artery measuring 4.2 cm. Correlate for pulmonary arterial hypertension.  HEART: Cardiomegaly. Aortic valve and mitral annulus calcification. No significant pericardial effusion.  CHEST WALL AND LOWER NECK: Anasarca.  VISUALIZED UPPER ABDOMEN: Grossly unremarkable.  BONES: Within normal limits.    IMPRESSION:  Bilateral pleural effusions, right more than left, cardiomegaly and question of pulmonary edema.    Anasarca, enlarged main pulmonary artery and additional findings as above.    < end of copied text >    < from: TTE Echo Complete w/o Contrast w/ Doppler (20 @ 09:52) >   Impression     Summary     Severe aortic stenosis is present.   At least moderate (2+) eccentric aortic regurgitation is present.   The POLLY by continuity equation is .47 cm2   The dimensionless index is .15   The left atrium is severely dilated.   Mild concentric left ventricular hypertrophy is present.   Estimated left ventricular ejection fraction is 50 %.   The IVC is dilated.   Severe mitral stenosis is present.   Severe mitral annular calcification is present.   Severe (4+) eccentric mitral regurgitation is present.   No evidence of pericardial effusion.   Pleural effusion - right pleural effusion.   Mild to moderate pulmonic valvular regurgitation is present.    < end of copied text >      DVT PPX: eliquis on hold, heparin sq     Advance Directive: DNR/DNI     Disposition: ABG

## 2020-09-12 NOTE — CONSULT NOTE ADULT - PROBLEM SELECTOR RECOMMENDATION 9
- NYHA class 3/4, on supplemental 02 2/3L. Nitro patch  - Echo 8/2020 EF 50%, CT chest w/ large RT effusion, questionable pulmonary edema   - On Lasix 20mg PO, Worsening renal function this AM. Hold diuresis repeat labs in AM  - daily weights, I&Os  - management of pleural effusion per CTS. - NYHA class 4, on supplemental 02 2/3L. Nitro patch  - Echo 8/2020 EF 50%, CT chest w/ large RT effusion, questionable pulmonary edema   - On Lasix 20mg PO, Worsening renal function this AM. Hold diuresis repeat labs in AM  - daily weights, I&Os  - management of pleural effusion per CTS.

## 2020-09-12 NOTE — PROVIDER CONTACT NOTE (OTHER) - BACKGROUND
Pt admitted w/ hyperkalemia, dehydration, s/p fall. PMH afib, CHF, HTN. Currently not on tele monitor.

## 2020-09-12 NOTE — PROGRESS NOTE ADULT - SUBJECTIVE AND OBJECTIVE BOX
Subjective:    pat lying in bed, no new complaint.    Home Medications:  Eliquis 2.5 mg oral tablet: 1 tab(s) orally 2 times a day (13 Aug 2020 02:46)  furosemide 40 mg oral tablet: 1 tab(s) orally once a day (13 Aug 2020 02:46)  Klor-Con 10 mEq oral tablet, extended release: 2 tab(s) orally 2 times a day (13 Aug 2020 02:46)  metoprolol tartrate 25 mg oral tablet: 1 tab(s) orally 2 times a day (13 Aug 2020 02:46)  Synthroid 112 mcg (0.112 mg) oral tablet: 1 tab(s) orally once a day (13 Aug 2020 02:46)    MEDICATIONS  (STANDING):  aspirin enteric coated 81 milliGRAM(s) Oral daily  cefTRIAXone Injectable.      influenza   Vaccine 0.5 milliLiter(s) IntraMuscular once  levothyroxine 100 MICROGram(s) Oral daily  metoprolol tartrate 25 milliGRAM(s) Oral two times a day  nitroglycerin    Patch 0.2 mG/Hr(s) 1 patch Transdermal daily    MEDICATIONS  (PRN):  acetaminophen   Tablet .. 650 milliGRAM(s) Oral every 6 hours PRN Mild Pain (1 - 3)  ondansetron Injectable 4 milliGRAM(s) IV Push every 6 hours PRN Nausea and/or Vomiting      Allergies    No Known Allergies    Intolerances        Vital Signs Last 24 Hrs  T(C): --  T(F): --  HR: --  BP: --  BP(mean): --  RR: --  SpO2: --      PHYSICAL EXAMINATION:    NECK:  Supple. No lymphadenopathy. Jugular venous pressure not elevated. Carotids equal.   HEART:   The cardiac impulse has a normal quality. Reg., Nl S1 and S2.  There are no murmurs, rubs or gallops noted  CHEST:  Chest is clear to auscultation. Normal respiratory effort.  ABDOMEN:  Soft and nontender.   EXTREMITIES:  There is no edema.       LABS:                        12.6   15.63 )-----------( 189      ( 12 Sep 2020 07:49 )             39.8     -    137  |  103  |  74<H>  ----------------------------<  97  4.3   |  25  |  1.97<H>    Ca    8.2<L>      12 Sep 2020 07:49    TPro  5.7<L>  /  Alb  2.8<L>  /  TBili  1.9<H>  /  DBili  x   /  AST  1184<H>  /  ALT  1354<H>  /  AlkPhos  77  09-12    PT/INR - ( 12 Sep 2020 07:49 )   PT: 24.9 sec;   INR: 2.21 ratio           Urinalysis Basic - ( 11 Sep 2020 11:30 )    Color: Yellow / Appearance: Slightly Turbid / S.025 / pH: x  Gluc: x / Ketone: Trace  / Bili: Small / Urobili: 1 mg/dL   Blood: x / Protein: 100 mg/dL / Nitrite: Negative   Leuk Esterase: Trace / RBC: 3-5 /HPF / WBC 3-5   Sq Epi: x / Non Sq Epi: Moderate / Bacteria: Negative

## 2020-09-12 NOTE — PROGRESS NOTE ADULT - ASSESSMENT
PROBLEMS:    S/p Fall  likely mechanical   B/L pleural effusion right>lef  Acute on Chronic diastolic CHF 2/2 valvular Dz-severe MR and AS  Hyperkalemia with LULA/CKD stage II-resolved  Severe MR and AS  Hypothyroidism  Chronic AFIB  Severe protein calorie malnutrition     PLAN:    hospice placement  Keep neg balance  R thoracentesis-as per CT surgery  supportive care  CT surgery fu  dvt prophylasix

## 2020-09-12 NOTE — CONSULT NOTE ADULT - PROBLEM SELECTOR RECOMMENDATION 2
- Echo w/ Severe AS/MS/MR  - She was evaluated 3 years ago for valvular intervention but a future course was undecided. Likely not surgical candidate given current clinical condition  - palliative consulted to assist with GOC. poor prognosis.

## 2020-09-12 NOTE — PROGRESS NOTE ADULT - SUBJECTIVE AND OBJECTIVE BOX
Subjective:  No acute events.    Vital Signs:  Vital Signs Last 24 Hrs  AVSS  on 3L NC    Relevant labs, radiology and Medications reviewed                        12.6   15.63 )-----------( 189      ( 12 Sep 2020 07:49 )             39.8     09-12    137  |  103  |  74<H>  ----------------------------<  97  4.3   |  25  |  1.97<H>    Ca    8.2<L>      12 Sep 2020 07:49    TPro  5.7<L>  /  Alb  2.8<L>  /  TBili  1.9<H>  /  DBili  x   /  AST  1184<H>  /  ALT  1354<H>  /  AlkPhos  77  09-12    PT/INR - ( 12 Sep 2020 07:49 )   PT: 24.9 sec;   INR: 2.21 ratio           MEDICATIONS  (STANDING):  aspirin enteric coated 81 milliGRAM(s) Oral daily  cefTRIAXone Injectable.      influenza   Vaccine 0.5 milliLiter(s) IntraMuscular once  levothyroxine 100 MICROGram(s) Oral daily  metoprolol tartrate 25 milliGRAM(s) Oral two times a day  nitroglycerin    Patch 0.2 mG/Hr(s) 1 patch Transdermal daily    MEDICATIONS  (PRN):  acetaminophen   Tablet .. 650 milliGRAM(s) Oral every 6 hours PRN Mild Pain (1 - 3)  ondansetron Injectable 4 milliGRAM(s) IV Push every 6 hours PRN Nausea and/or Vomiting      Physical exam  Gen: NAD, breathing comfortably  Neuro: AO - confused  Card: S1 S2  Pulm: decreased breath sounds on the right  Abd: soft NT ND  Ext: 4+ pitting edema    Assessment  89y Female  w/ PAST MEDICAL & SURGICAL HISTORY:  Afib    CVA (cerebral vascular accident)    Hypothyroidism    Cerebrovascular accident (CVA)    Atrial fibrillation    Essential hypertension    Chronic multifocal osteomyelitis, other site    Scarlet fever    S/P debridement    Patient is a 89y old  Female who presents with a chief complaint of Shortness of breath (12 Sep 2020 10:48)    89y Female pmhx CVA, CHF, severe AS and MS, mod pulm HTN, AF on AC admitted 9/7 s/p fall at home. Pt w SOB, b/l effusions. CT chest today shows a large right effusion. Called for drainage.  Persistent effusion - INR elevated     PLAN  Neuro: Pain management  Pulm: Encourage coughing, deep breathing and use of incentive spirometry. Wean off supplemental oxygen as able. Daily CXR.   Cardio: Monitor telemetry/alarms  GI: Tolerating diet. Continue stool softeners.  Renal: monitor urine output, supplement electrolytes as needed  Vasc: Heparin SC/SCDs for DVT prophylaxis  Heme: Stable H/H. .   ID:   Therapy: OOB/ambulate, PT  Tubes: Monitor Chest tube output    Discussed with Cardiothoracic Team at AM rounds.     Subjective:  No acute events.    Vital Signs:  Vital Signs Last 24 Hrs  AVSS  on 3L NC    Relevant labs, radiology and Medications reviewed                        12.6   15.63 )-----------( 189      ( 12 Sep 2020 07:49 )             39.8     09-12    137  |  103  |  74<H>  ----------------------------<  97  4.3   |  25  |  1.97<H>    Ca    8.2<L>      12 Sep 2020 07:49    TPro  5.7<L>  /  Alb  2.8<L>  /  TBili  1.9<H>  /  DBili  x   /  AST  1184<H>  /  ALT  1354<H>  /  AlkPhos  77  09-12    PT/INR - ( 12 Sep 2020 07:49 )   PT: 24.9 sec;   INR: 2.21 ratio           MEDICATIONS  (STANDING):  aspirin enteric coated 81 milliGRAM(s) Oral daily  cefTRIAXone Injectable.      influenza   Vaccine 0.5 milliLiter(s) IntraMuscular once  levothyroxine 100 MICROGram(s) Oral daily  metoprolol tartrate 25 milliGRAM(s) Oral two times a day  nitroglycerin    Patch 0.2 mG/Hr(s) 1 patch Transdermal daily    MEDICATIONS  (PRN):  acetaminophen   Tablet .. 650 milliGRAM(s) Oral every 6 hours PRN Mild Pain (1 - 3)  ondansetron Injectable 4 milliGRAM(s) IV Push every 6 hours PRN Nausea and/or Vomiting      Physical exam  Gen: NAD, breathing comfortably  Neuro: AO - confused  Card: S1 S2  Pulm: decreased breath sounds on the right  Abd: soft NT ND  Ext: 4+ pitting edema    Assessment  89y Female  w/ PAST MEDICAL & SURGICAL HISTORY:  Afib    CVA (cerebral vascular accident)    Hypothyroidism    Cerebrovascular accident (CVA)    Atrial fibrillation    Essential hypertension    Chronic multifocal osteomyelitis, other site    Scarlet fever    S/P debridement    Patient is a 89y old  Female who presents with a chief complaint of Shortness of breath (12 Sep 2020 10:48)    89y Female pmhx CVA, CHF, severe AS and MS, mod pulm HTN, AF on AC admitted 9/7 s/p fall at home. Pt w SOB, b/l effusions. CT chest today shows a large right effusion. Called for drainage.  Persistent effusion - INR elevated due to malnourishment and vitamin deficiency.    Spoke with Dr. Tinoco.  Thoracentesis +/- chest tube is for symptomatic management - currently on 3L NC with good oxygen saturations.  Given her frailty and risk of bleeding - procedure not emergent.  Would give another does of vitamin K with possible drainage tomorrow.    Discussed with Cardiothoracic Team at AM rounds.

## 2020-09-12 NOTE — CONSULT NOTE ADULT - SUBJECTIVE AND OBJECTIVE BOX
CHIEF COMPLAINT: shortness of beath     HPI: 88 y/o female w/ PMHx significant for Afib, CVA, HTN, chronic diastolic HF, severe AS/MS admitted 09/07/20. Presented from home to ED via EMS.  c/o fall.  reports she, "tripped over [her] own too feet."  denied LOC.  she has very poor recollection of events.  states she was laying on the floor for several hours before EMS arrived.  Recently, patient was hospitalized at  from 08/13-08/15 due to mild exacerbation of HFpEF.   ~ Cardiology was requested to evaluate symptoms. Pt has a history of severe AS and MS for which she is followed by [facility]. She has not seen her cardiologist for a period of time. She was evaluated 3 years ago for valvular intervention but a future course was undecided. She reports that she was relatively asymptomatic at that time    9/12: c/o sob at rest/exertion, dry cough  All other review of systems are negative unless indicated above.      PAST MEDICAL & SURGICAL HISTORY:  Afib  CVA (cerebral vascular accident)  Hypothyroidism  Cerebrovascular accident (CVA)  CHF  Severe AS/MS  Atrial fibrillation  Essential hypertension  Chronic multifocal osteomyelitis, other site  Scarlet fever  S/P debridement    ALLERGIES:  No Known Allergies  Intolerances    SOCIAL HISTORY:   - lives at home and is assisted by a A  - denies smoking, ETOH    FAMILY HISTORY:  Family history of heart disease (Sibling)    HOME MEDICATIONS:  Eliquis 2.5 mg oral tablet: 1 tab(s) orally 2 times a day (13 Aug 2020 02:46)  furosemide 40 mg oral tablet: 1 tab(s) orally once a day (13 Aug 2020 02:46)  Klor-Con 10 mEq oral tablet, extended release: 2 tab(s) orally 2 times a day (13 Aug 2020 02:46)  metoprolol tartrate 25 mg oral tablet: 1 tab(s) orally 2 times a day (13 Aug 2020 02:46)  Synthroid 112 mcg (0.112 mg) oral tablet: 1 tab(s) orally once a day (13 Aug 2020 02:46)    INPATIENT MEDICATIONS  (STANDING):  cefTRIAXone Injectable.      furosemide    Tablet 20 milliGRAM(s) Oral daily  influenza   Vaccine 0.5 milliLiter(s) IntraMuscular once  levothyroxine 100 MICROGram(s) Oral daily  metoprolol tartrate 25 milliGRAM(s) Oral two times a day  nitroglycerin    Patch 0.2 mG/Hr(s) 1 patch Transdermal daily    MEDICATIONS  (PRN):  acetaminophen   Tablet .. 650 milliGRAM(s) Oral every 6 hours PRN Mild Pain (1 - 3)  ondansetron Injectable 4 milliGRAM(s) IV Push every 6 hours PRN Nausea and/or Vomiting    PHYSICAL EXAM:  Constitutional: Dyspnea w/ conversation, cachectic, pale    HEENT: PERRL, EOMI, MMM.  Neck: Soft and supple, No JVD  Respiratory: Breath sounds b/l rales, + wheezing   Cardiovascular: S1 and S2, IRIR, + murmur   Gastrointestinal: Bowel Sounds present, soft, nontender, nondistended, no guarding, no rebound, no mass.  Extremities: (+) LE edema  Vascular: 2+ peripheral pulses  Neurological: A/O x 3, no focal deficits  Musculoskeletal: 5/5 strength b/l upper and lower extremities  Skin:  no visible rashes.     Vital Signs Last 24 Hrs  T(C): 36.4 (07 Sep 2020 05:51), Max: 36.4 (07 Sep 2020 05:51)  T(F): 97.6 (07 Sep 2020 05:51), Max: 97.6 (07 Sep 2020 05:51)  HR: 92 (07 Sep 2020 07:48) (92 - 100)  BP: 105/62 (07 Sep 2020 07:48) (105/62 - 119/52)  BP(mean): --  RR: 21 (07 Sep 2020 07:48) (17 - 21)  SpO2: 100% (07 Sep 2020 07:48) (98% - 100%) -- 2/3l nc    LABS: All Labs Reviewed:                        12.6   15.63 )-----------( 189      ( 12 Sep 2020 07:49 )             39.8     09-12    137  |  103  |  74<H>  ----------------------------<  97  4.3   |  25  |  1.97<H>    Ca    8.2<L>      12 Sep 2020 07:49    TPro  5.7<L>  /  Alb  2.8<L>  /  TBili  1.9<H>  /  DBili  x   /  AST  1184<H>  /  ALT  1354<H>  /  AlkPhos  77  09-12    PT/INR - ( 12 Sep 2020 07:49 )   PT: 24.9 sec;   INR: 2.21 ratio        RADIOLOGY:  < from: CT Chest No Cont (09.10.20 @ 12:04) >  IMPRESSION:  Bilateral pleural effusions, right more than left, cardiomegaly and question of pulmonary edema.  Anasarca, enlarged main pulmonary artery and additional findings as above.  < end of copied text >    ECHOCARDIOGRAM:  < from: TTE Echo Complete w/o Contrast w/ Doppler (08.13.20 @ 09:52) >  Impression     Summary     Severe aortic stenosis is present.   At least moderate (2+) eccentric aortic regurgitation is present.   The POLLY by continuity equation is .47 cm2   The dimensionless index is .15   The left atrium is severely dilated.   Mild concentric left ventricular hypertrophy is present.   Estimated left ventricular ejection fraction is 50 %.   The IVC is dilated.   Severe mitral stenosis is present.   Severe mitral annular calcification is present.   Severe (4+) eccentric mitral regurgitation is present.   No evidence of pericardial effusion.   Pleural effusion - right pleural effusion.   Mild to moderate pulmonic valvular regurgitation is present.   The right atrium appears moderately dilated.   The right ventricle is not always well visualized, appears grossly normal   in size.   The tricuspid valve leaflets appear mildly thickened and/or calcified, but   open well.   Mild to Moderate Tricuspid regurgitation is present.   Moderate pulmonary hypertension.      EKG (preliminary):  9/8. AF. Inferolateral abnormalities appear grossly unchanged as compared to 08/13/20 tracing.

## 2020-09-12 NOTE — PROVIDER CONTACT NOTE (OTHER) - ASSESSMENT
BP 97/50. HR 87. Metoprolol due at 2200. MD Bazzi made aware. Clarification to give metoprolol needed.

## 2020-09-12 NOTE — PROGRESS NOTE ADULT - ASSESSMENT
89 yo female with   PMH of a. fib on eliquis, h/o CVA, CHF, Severe AS and MR  HTN, hypothyroidism with:    #LULA/CKD - creatinine  trending up  - prerenal, poor oral intake, lasix held   - no obstruction  - cannot give fluids due to B/L pleural effusions/valvular disease/PAH  - avoid nephrotoxic drugs  - monitor creatinine  - Encourage oral intake     # Leukocytosis - no fevers   - possiby 2/2 microaspiration?   - Pts mental status improved, aspiration precautions    - On ceftriaxone for  possible aspiration PNA  - UCx  contaminant,  F/u repeat UCX       # Change in mental status -  toxic metabolic encephalopathy 2/2hypoxia and meds   - better today   - hold IV moprphine      # S/p Fall,  likely mechanical   No visible signs of trauma, but Pt refused Xrays   Did not  comply with PT eval, but was able to get up for orthostatics check   Orthostatics neg   Fall precautions   C/w tele: Paced, underline AFIB     # Hyperkalemia  2/2 LULA and Po K supplementation  resolved after Tx, monitor closely  No K supplements       # ACute hypoxic respiratory failure 2/2    Acute on    Chronic diastolic CHF,  R moderate- large Pleural effusion severe MR and AS   Dyspnea   Pulse ox stable with 3l O2  CT chest on 9/10 shows Bilateral pleural effusions, right more than left, cardiomegaly and question of pulmonary edema.  Anasarca, enlarged main pulmonary artery   BNP 2500  ABG with respiratory  acidosis  Hold lasix for now due to LULA  and i/vacular depletion  hold morphine  IV, trial of Roxanol  PRN  weight daily   Last ECHO: EF 50%   D/w Dr Ghosh, due to advanced valvular Dz will be difficult to keep Pt euvolemic, palliative care and possible hospice recommended   however pt. refused   D/w CT Sx about thoracentesis for improvement of dyspnea, but on Hold due to elevated INR and also Pt non consistent with her wishes, as per Pts HCP,  " would not want to add suffering"     # Elevated troponin   No chest pain,   ECG: AFIB with ST and T wave changes, prolonged QT, suspect related to electrolyte abnormality   repeat EKG in am   Start  ASA, c/w metoprolol  CArdio eval appreciated, no further work up at this time     # Severe MR and AS  Pt was evaluated in the past by Dr José for possible Sx, but declined   Monitor closely   Palliative eval recommended by cardio     #  Hypothyroidism   with low TSH   Decreased  Synthroid to 100mcg  will repeat labs 40-6 weeks     #  Chronic AFIB  clarification: no PPM    on low dose eliquis  -on  hold for throacentesis but if Pt refuses, may resume, reeval in am   C/w metoprolol     # Severe protein calorie malnutrition   supplements  Supportive care      Had long conversation with son, who is notably anxious about making decisions, as per son, had conversation with Pt on the phone and Pt mentioned multiple times " I would want to go as fast as possible, give me as much morphine  as needed" Son was asking about morphine drip, explained, that Pts symptoms  will likely  can be managed with much lower doses, will start with oral Morphine and if needed will switch to IV and dose will be titrated. ALso he expressed concern about idea of home Hospice  as pt  lives alone and hs only 8h aids available.

## 2020-09-13 LAB
ANION GAP SERPL CALC-SCNC: 4 MMOL/L — LOW (ref 5–17)
BUN SERPL-MCNC: 72 MG/DL — HIGH (ref 7–23)
CALCIUM SERPL-MCNC: 8.4 MG/DL — LOW (ref 8.5–10.1)
CHLORIDE SERPL-SCNC: 105 MMOL/L — SIGNIFICANT CHANGE UP (ref 96–108)
CO2 SERPL-SCNC: 30 MMOL/L — SIGNIFICANT CHANGE UP (ref 22–31)
CREAT SERPL-MCNC: 1.67 MG/DL — HIGH (ref 0.5–1.3)
GLUCOSE SERPL-MCNC: 76 MG/DL — SIGNIFICANT CHANGE UP (ref 70–99)
HCT VFR BLD CALC: 41.1 % — SIGNIFICANT CHANGE UP (ref 34.5–45)
HGB BLD-MCNC: 12.7 G/DL — SIGNIFICANT CHANGE UP (ref 11.5–15.5)
MCHC RBC-ENTMCNC: 30.9 GM/DL — LOW (ref 32–36)
MCHC RBC-ENTMCNC: 31.6 PG — SIGNIFICANT CHANGE UP (ref 27–34)
MCV RBC AUTO: 102.2 FL — HIGH (ref 80–100)
PLATELET # BLD AUTO: 151 K/UL — SIGNIFICANT CHANGE UP (ref 150–400)
POTASSIUM SERPL-MCNC: 4.2 MMOL/L — SIGNIFICANT CHANGE UP (ref 3.5–5.3)
POTASSIUM SERPL-MCNC: 4.4 MMOL/L — SIGNIFICANT CHANGE UP (ref 3.5–5.3)
POTASSIUM SERPL-SCNC: 4.2 MMOL/L — SIGNIFICANT CHANGE UP (ref 3.5–5.3)
POTASSIUM SERPL-SCNC: 4.4 MMOL/L — SIGNIFICANT CHANGE UP (ref 3.5–5.3)
RBC # BLD: 4.02 M/UL — SIGNIFICANT CHANGE UP (ref 3.8–5.2)
RBC # FLD: 14.9 % — HIGH (ref 10.3–14.5)
SODIUM SERPL-SCNC: 139 MMOL/L — SIGNIFICANT CHANGE UP (ref 135–145)
WBC # BLD: 11.79 K/UL — HIGH (ref 3.8–10.5)
WBC # FLD AUTO: 11.79 K/UL — HIGH (ref 3.8–10.5)

## 2020-09-13 PROCEDURE — 99233 SBSQ HOSP IP/OBS HIGH 50: CPT

## 2020-09-13 PROCEDURE — 93010 ELECTROCARDIOGRAM REPORT: CPT

## 2020-09-13 RX ORDER — ASPIRIN/CALCIUM CARB/MAGNESIUM 324 MG
81 TABLET ORAL ONCE
Refills: 0 | Status: COMPLETED | OUTPATIENT
Start: 2020-09-13 | End: 2020-09-13

## 2020-09-13 RX ADMIN — Medication 100 MICROGRAM(S): at 06:12

## 2020-09-13 RX ADMIN — MORPHINE SULFATE 2.5 MILLIGRAM(S): 50 CAPSULE, EXTENDED RELEASE ORAL at 09:37

## 2020-09-13 RX ADMIN — MORPHINE SULFATE 2.5 MILLIGRAM(S): 50 CAPSULE, EXTENDED RELEASE ORAL at 11:22

## 2020-09-13 RX ADMIN — CEFTRIAXONE 1000 MILLIGRAM(S): 500 INJECTION, POWDER, FOR SOLUTION INTRAMUSCULAR; INTRAVENOUS at 09:38

## 2020-09-13 RX ADMIN — Medication 1 PATCH: at 19:30

## 2020-09-13 RX ADMIN — Medication 81 MILLIGRAM(S): at 23:45

## 2020-09-13 RX ADMIN — Medication 1 PATCH: at 20:59

## 2020-09-13 RX ADMIN — Medication 1 PATCH: at 09:38

## 2020-09-13 NOTE — CONSULT NOTE ADULT - PROBLEM SELECTOR RECOMMENDATION 4
- mildly elevated troponin. no c/o cp. ECG w/o significant changes  - no statin given elevated LFTs   - start ASA 81 - mildly elevated troponin. no c/o cp. ECG w/o significant changes  - no statin given elevated LFTs   -  ASA 81

## 2020-09-13 NOTE — PROGRESS NOTE ADULT - SUBJECTIVE AND OBJECTIVE BOX
Subjective:  Sleeping comfortably     Vital Signs:  Vital Signs Last 24 Hrs  T(C): 36.7 (09-13-20 @ 09:15), Max: 36.7 (09-13-20 @ 09:15)  T(F): 98 (09-13-20 @ 09:15), Max: 98 (09-13-20 @ 09:15)  HR: 89 (09-13-20 @ 09:15) (86 - 97)  BP: 105/60 (09-13-20 @ 09:15) (97/50 - 112/53)  RR: 16 (09-13-20 @ 09:15) (16 - 18)  SpO2: 98% (09-13-20 @ 09:15) (94% - 100%) on (O2)    Telemetry/Alarms:    Relevant labs, radiology and Medications reviewed                        12.7 11.79 )-----------( 151      ( 13 Sep 2020 08:12 )             41.1     09-13    139  |  105  |  72<H>  ----------------------------<  76  4.4   |  30  |  1.67<H>    Ca    8.4<L>      13 Sep 2020 08:12    TPro  5.7<L>  /  Alb  2.8<L>  /  TBili  1.9<H>  /  DBili  x   /  AST  1184<H>  /  ALT  1354<H>  /  AlkPhos  77  09-12    PT/INR - ( 12 Sep 2020 07:49 )   PT: 24.9 sec;   INR: 2.21 ratio           MEDICATIONS  (STANDING):  aspirin enteric coated 81 milliGRAM(s) Oral daily  cefTRIAXone Injectable.      influenza   Vaccine 0.5 milliLiter(s) IntraMuscular once  levothyroxine 100 MICROGram(s) Oral daily  metoprolol tartrate 25 milliGRAM(s) Oral two times a day  nitroglycerin    Patch 0.2 mG/Hr(s) 1 patch Transdermal daily    MEDICATIONS  (PRN):  acetaminophen   Tablet .. 650 milliGRAM(s) Oral every 6 hours PRN Mild Pain (1 - 3)  morphine Concentrate 2.5 milliGRAM(s) Oral every 6 hours PRN dyspnea  ondansetron Injectable 4 milliGRAM(s) IV Push every 6 hours PRN Nausea and/or Vomiting      Physical exam   Gen  sleeping this morning   Neuro arousable   Card  rate controlled   Pulm diminished breath sounds right greater than left   Abd  soft nd/nt   Ext warm and well perfused         I&O's Summary      Assessment  89y Female  w/ PAST MEDICAL & SURGICAL HISTORY:  Afib    CVA (cerebral vascular accident)    Hypothyroidism    Cerebrovascular accident (CVA)    Atrial fibrillation    Essential hypertension    Chronic multifocal osteomyelitis, other site    Scarlet fever    S/P debridement    admitted with complaints of Patient is a 89y old  Female who presents with a chief complaint of Shortness of breath (13 Sep 2020 08:22)  .  On (Date), patient underwent . Postoperative course/issues:    PLAN    She appears to be sleeping comfortably without using accessory breathing muscles.   I attempted again to talk to the Patient today regarding the effusion as it is moderate-to large.     She could not give me a straight forward answer on what she wants done, She does talk in a very tangential manner.   I think it would be useful to determine goals of care etc.     Will cont to follow along

## 2020-09-13 NOTE — CONSULT NOTE ADULT - PROBLEM SELECTOR RECOMMENDATION 5
- off Eliquis for pig-tail   - on BB BID - off Eliquis for pig-tail. resume eliquis post pig-tail. CTA to adivse.   - on BB BID - off Eliquis for pig-tail. resume eliquis post pig-tail. CTS to adivse.   - on BB BID

## 2020-09-13 NOTE — CONSULT NOTE ADULT - PROBLEM SELECTOR RECOMMENDATION 2
- Echo w/ Severe AS/MS/MR  - She was evaluated 3 years ago for valvular intervention but a future course was undecided. Likely not surgical candidate given current clinical condition  - Palliative consulted to assist with GOC. poor prognosis. - Echo w/ Severe AS/MS/MR  - She was evaluated 3 years ago for valvular intervention but conservative management was pursued.   - Palliative consulted to assist with GOC. poor prognosis.

## 2020-09-13 NOTE — CONSULT NOTE ADULT - CONSULT REQUESTED DATE/TIME
07-Sep-2020 15:22
09-Sep-2020
11-Sep-2020 08:48
12-Sep-2020 10:49
13-Sep-2020 08:23
10-Sep-2020 18:14

## 2020-09-13 NOTE — PROVIDER CONTACT NOTE (OTHER) - SITUATION
Spoke with Dr. Ghosh
Pt due for metoprolol 25mg, no parameters provided.
Spoke with July ma of consult.
Spoke with Kerline. Office is aware of consult.
pt yelling for "help"; approached patient and c/o chest pain rubbing her chest.

## 2020-09-13 NOTE — CONSULT NOTE ADULT - PROBLEM SELECTOR RECOMMENDATION 3
- Plan for CTS RT pigtail cath placement. - Plan for CTS RT pigtail cath placement today. Management per CTS Possible pigtail cath placement. Management per CTS

## 2020-09-13 NOTE — CONSULT NOTE ADULT - PROBLEM SELECTOR RECOMMENDATION 9
- NYHA class 4, on supplemental 02 2/3L  - Echo 8/2020 EF 50%, CT chest w/ large RT effusion, questionable pulmonary edema. on nitro patch  - On Lasix 20mg PO, Worsening renal function this AM. Hold diuresis repeat labs in AM --> pending   - daily weights, I&Os - weight gain noted   - management of pleural effusion per CTS.  - Roxanol started prn for dyspnea. - NYHA class 4, on supplemental 02 2/3L  - Echo 8/2020 EF 50%, CT chest w/ large RT effusion, questionable pulmonary edema. on nitro patch  - resume. Lasix 20mg PO every other day, monitor renal function.   - daily weights, I&Os - weight gain noted   - management of pleural effusion per CTS.  - Roxanol started prn for dyspnea.

## 2020-09-13 NOTE — CONSULT NOTE ADULT - ATTENDING COMMENTS
I was physically present for key portions of this E&M service. I have reviewed the plan and discussed it with the NP/PA/Pt. and have edited the note where it is appropriate.
I was physically present for key portions of this E&M service. I have reviewed the plan and discussed it with the NP/PA/Pt. and have edited the note where it is appropriate.

## 2020-09-13 NOTE — PROGRESS NOTE ADULT - SUBJECTIVE AND OBJECTIVE BOX
CC: shortness of breath      HPI: 89 y.o female with   PMH of monique mathur on eliquis, h/o CVA, CHF, Severe AS and MR  HTN, hypothyroidism, presented from home to ED via EMS s/p fall. Pt   reports she  "tripped over [her] own two feet."  , she was walking to the bathroom. Denied LOC, denies CP or dizziness. Unable to obtain more details.  She was laying on the floor for several hours before EMS arrived.    Patient was hospitalized at  from 08/13-08/15 due to mild exacerbation of HFpEF.  among her DC medications were potassium 20mEq po bid and Lasix 40mg po qd.  in ED, potassium was found to be elevated (6.8).  Tx with insulin 4 units, CLARA MDI and Kayexalate were administered.  CPK was wnl.  , TnI was mildly elevated (0.063).  patient refused  further imaging (CXR as well as b/l hip and pelvis xray).    Had 1L of IVF     INTERVAL HPI/ OVERNIGHT EVENTS:  Pt was seen and  examined,  more alert and awake, able to drink herself, but refusing to eat.  Attempted to discuss plan with Ct Sx at bedside,  Pt was asking questions about possible pain, stated " I wont b able to sit up".     On qs about her breathing she states  " my breathing is awgul" Pt though looks comfortable at  rest on NC. VS stable     REVIEW OF SYSTEMS:  All other review of systems is negative unless indicated above    PHYSICAL EXAM:  ICU Vital Signs Last 24 Hrs  T(C): 36.4 (13 Sep 2020 11:26), Max: 36.7 (13 Sep 2020 09:15)  T(F): 97.5 (13 Sep 2020 11:26), Max: 98 (13 Sep 2020 09:15)  HR: 88 (13 Sep 2020 11:26) (86 - 97)  BP: 104/45 (13 Sep 2020 11:26) (97/50 - 112/53)  RR: 20 (13 Sep 2020 11:26) (16 - 20)  SpO2: 99% (13 Sep 2020 11:26) (94% - 100%)  General: Well developed; malnourished, frail elderly female,  dyspneic on  conversation  Eyes: PERRLA, EOMI; conjunctiva and sclera clear  Head: Normocephalic; atraumatic  ENMT: No nasal discharge; airway clear  Neck: Supple; non tender; no masses  Respiratory: Diminished BS at bases b/l. No  rales   Cardiovascular: Irregular rate and rhythm. S1 and S2 Normal; +  murmur  Gastrointestinal: Soft non-tender non-distended; Normal bowel sounds  Genitourinary: No  suprapubic  tenderness  Extremities: + le edema, venous stasis changes   Neurological: Alert and oriented x 2, non focal  Skin: Warm and dry. No acute rash  Lymph Nodes: No acute cervical adenopathy    Labs:       CBC Full  -  ( 13 Sep 2020 08:12 )  WBC Count : 11.79 K/uL  RBC Count : 4.02 M/uL  Hemoglobin : 12.7 g/dL  Hematocrit : 41.1 %  Platelet Count - Automated : 151 K/uL  Mean Cell Volume : 102.2 fl  Mean Cell Hemoglobin : 31.6 pg  Mean Cell Hemoglobin Concentration : 30.9 gm/dL  Auto Neutrophil # : x  Auto Lymphocyte # : x  Auto Monocyte # : x  Auto Eosinophil # : x  Auto Basophil # : x  Auto Neutrophil % : x  Auto Lymphocyte % : x  Auto Monocyte % : x  Auto Eosinophil % : x  Auto Basophil % : x    PT/INR - ( 12 Sep 2020 07:49 )   PT: 24.9 sec;   INR: 2.21 ratio             09-13    139  |  105  |  72<H>  ----------------------------<  76  4.4   |  30  |  1.67<H>    Ca    8.4<L>      13 Sep 2020 08:12    89 yo female with   PMH of a. fib on eliquis, h/o CVA, CHF, Severe AS and MR  HTN, hypothyroidism with:    # Acute on Chronic diastolic (congestive) heart failure  - NYHA class 4, on supplemental 02 2/3L  #  Acute hypoxic respiratory failure 2/2    Acute on    Chronic diastolic CHF,  R moderate - large Pleural effusion with severe valvular heart disease   # Mitral stenosis and aortic insufficiency -  Echo w/ Severe AS/MS/MR  - She was evaluated 3 years ago for valvular intervention but a future course was undecided. Likely not surgical candidate given current clinical condition  - Palliative consulted to assist with GOC. poor prognosis.   - Echo 8/2020 EF 50%, CT chest w/ large RT effusion, questionable pulmonary edema. on nitro patch  - resume. Lasix 20mg PO every other day, monitor renal function.   - daily weights, I&Os - weight gain noted   - management of pleural effusion per CTS.  - Roxanol started prn for dyspnea.    - CT chest on 9/10 shows Bilateral pleural effusions, right more than left, cardiomegaly and question of pulmonary edema.  Anasarca, enlarged main pulmonary artery       #LULA/CKD - creatinine  trending up  - prerenal, poor oral intake, lasix held   - no obstruction  - cannot give fluids due to B/L pleural effusions/valvular disease/PAH  - avoid nephrotoxic drugs  - monitor creatinine  - Encourage oral intake     # Leukocytosis - no fevers   - possiby 2/2 microaspiration?   - Pts mental status improved, aspiration precautions    - On ceftriaxone for  possible aspiration PNA  - UCx  contaminant,  F/u repeat UCX       # Change in mental status -  toxic metabolic encephalopathy 2/2hypoxia and meds   - better today   - hold IV moprphine      # S/p Fall,  likely mechanical   No visible signs of trauma, but Pt refused Xrays   Did not  comply with PT eval, but was able to get up for orthostatics check   Orthostatics neg   Fall precautions   C/w tele: Paced, underline AFIB     # Hyperkalemia  2/2 LULA and Po K supplementation  resolved after Tx, monitor closely  No K supplements     # Elevated troponin   No chest pain,   ECG: AFIB with ST and T wave changes, prolonged QT, suspect related to electrolyte abnormality   repeat EKG in am   Start  ASA, c/w metoprolol  CArdio eval appreciated, no further work up at this time     #  Hypothyroidism   with low TSH   Decreased  Synthroid to 100mcg  will repeat labs 40-6 weeks     #  Chronic AFIB  clarification: no PPM    on low dose eliquis  -on  hold for throacentesis but if Pt refuses, may resume, reeval in am   C/w metoprolol     # Severe protein calorie malnutrition   supplements  Supportive care              CC: shortness of breath      89 y.o female with   PMH of aChristina mathur on eliquis, h/o CVA, CHF, Severe AS and MR  HTN, hypothyroidism, presented from home to ED via EMS s/p fall. Pt   reports she  "tripped over [her] own two feet."  , she was walking to the bathroom. Denied LOC, denies CP or dizziness. Unable to obtain more details.  She was laying on the floor for several hours before EMS arrived.  Patient was hospitalized at  from 08/13-08/15 due to mild exacerbation of HFpEF.  among her DC medications were potassium 20mEq po bid and Lasix 40mg po qd. in ED, potassium was found to be elevated (6.8).  Tx with insulin 4 units, CLARA MDI and Kayexalate were administered.  CPK was wnl.  , TnI was mildly elevated (0.063).  patient refused  further imaging (CXR as well as b/l hip and pelvis xray).      Seen and eval. Hemodynamically stable. Patient is very deconditioned, frail, sick. Patient has a poor long term prognosis. Care discusseed with son -  he would want patient to go to inpatient hospice.     REVIEW OF SYSTEMS:  All other review of systems is negative unless indicated above    PHYSICAL EXAM:  T(C): 36.4 (13 Sep 2020 11:26), Max: 36.7 (13 Sep 2020 09:15)  T(F): 97.5 (13 Sep 2020 11:26), Max: 98 (13 Sep 2020 09:15)  HR: 88 (13 Sep 2020 11:26) (86 - 97)  BP: 104/45 (13 Sep 2020 11:26) (97/50 - 112/53)  RR: 20 (13 Sep 2020 11:26) (16 - 20)  SpO2: 99% (13 Sep 2020 11:26) (94% - 100%)  General: Well developed; malnourished, frail elderly female,  dyspneic on  conversation  Eyes: PERRLA, EOMI; conjunctiva and sclera clear  Head: Normocephalic; atraumatic  ENMT: No nasal discharge; airway clear  Neck: Supple; non tender; no masses  Respiratory: Diminished BS at bases b/l. No  rales   Cardiovascular: Irregular rate and rhythm. S1 and S2 Normal; +  murmur  Gastrointestinal: Soft non-tender non-distended; Normal bowel sounds  Genitourinary: No  suprapubic  tenderness  Extremities: + le edema, venous stasis changes   Neurological: Alert and oriented x 2, non focal  Skin: Warm and dry. No acute rash  Lymph Nodes: No acute cervical adenopathy    Labs:     CBC Full  -  ( 13 Sep 2020 08:12 )  WBC Count : 11.79 K/uL  RBC Count : 4.02 M/uL  Hemoglobin : 12.7 g/dL  Hematocrit : 41.1 %  Platelet Count - Automated : 151 K/uL    PT/INR - ( 12 Sep 2020 07:49 )   PT: 24.9 sec;   INR: 2.21 ratio      139  |  105  |  72<H>  ----------------------------<  76  4.4   |  30  |  1.67<H>    Ca    8.4<L>      13 Sep 2020 08:12    89 yo female with   PMH of a. fib on eliquis, h/o CVA, CHF, Severe AS and MR  HTN, hypothyroidism with:    # Acute on Chronic diastolic (congestive) heart failure  - NYHA class 4, on supplemental 02 2/3L  # Acute hypoxic respiratory failure 2/2    Acute on Chronic diastolic CHF,  R moderate - large Pleural effusion with severe valvular heart disease   # Mitral stenosis and aortic insufficiency - Echo w/ Severe AS / MS / MR  - She was evaluated 3 years ago for valvular intervention but a future course was undecided. Likely not surgical candidate given current clinical condition  - Palliative consulted to assist with GOC. poor prognosis.   - Echo 8/2020 EF 50%, CT chest w/ large RT effusion, questionable pulmonary edema. on nitro patch  - resume. Lasix 20mg PO every other day, monitor renal function.   - daily weights, I&Os - weight gain noted   - management of pleural effusion per CTS.  - Roxanol started prn for dyspnea.  - CT chest on 9/10 shows Bilateral pleural effusions, right more than left, cardiomegaly and question of pulmonary edema.  Anasarca, enlarged main pulmonary artery     #LULA / CKD - creatinine  trending up  - prerenal, poor oral intake, lasix held   - no obstruction  - cannot give fluids due to B/L pleural effusions/valvular disease/PAH  - avoid nephrotoxic drugs  - monitor creatinine  - Encourage oral intake     # Leukocytosis - no fevers   - possibly 2/2 microaspiration?   - Pts mental status improved, aspiration precautions    - On ceftriaxone for  possible aspiration PNA  - UCx  contaminant,  F/u repeat UCX     # Change in mental status -  toxic metabolic encephalopathy 2/2hypoxia and meds   - better today   - hold IV morphine    # S/p Fall,  likely mechanical   No visible signs of trauma, but Pt refused Xrays   Did not  comply with PT eval, but was able to get up for orthostatics check   Orthostatics neg   Fall precautions   C/w tele: Paced, underline AFIB     # Hyperkalemia  2/2 LULA and Po K supplementation  - resolved after Tx, monitor closely  - No K supplements     # Elevated troponin   No chest pain,   ECG: AFIB with ST and T wave changes, prolonged QT, suspect related to electrolyte abnormality   repeat EKG in am   Start  ASA, c/w metoprolol  CArdio eval appreciated, no further work up at this time     #  Hypothyroidism   with low TSH   Decreased  Synthroid to 100mcg  will repeat labs 40-6 weeks     #  Chronic AFIB  clarification: no PPM    on low dose eliquis  -on  hold for throacentesis but if Pt refuses, may resume, reeval in am   C/w metoprolol     # Severe protein calorie malnutrition   supplements  Supportive care

## 2020-09-13 NOTE — CONSULT NOTE ADULT - SUBJECTIVE AND OBJECTIVE BOX
CHIEF COMPLAINT: shortness of beath     HPI: 89 y/o female w/ PMHx significant for Afib, CVA, HTN, chronic diastolic HF, severe AS/MS admitted 09/07/20. Presented from home to ED via EMS.  c/o fall.  reports she, "tripped over [her] own too feet."  denied LOC.  she has very poor recollection of events.  states she was laying on the floor for several hours before EMS arrived.  Recently, patient was hospitalized at  from 08/13-08/15 due to mild exacerbation of HFpEF.   ~ Cardiology was requested to evaluate symptoms. Pt has a history of severe AS and MS for which she is followed by [facility]. She has not seen her cardiologist for a period of time. She was evaluated 3 years ago for valvular intervention but a future course was undecided. She reports that she was relatively asymptomatic at that time    9/12: c/o sob at rest/exertion, dry cough  All other review of systems are negative unless indicated above.      PAST MEDICAL & SURGICAL HISTORY:  Afib  CVA (cerebral vascular accident)  Hypothyroidism  Cerebrovascular accident (CVA)  CHF  Severe AS/MS  Atrial fibrillation  Essential hypertension  Chronic multifocal osteomyelitis, other site  Scarlet fever  S/P debridement    ALLERGIES:  No Known Allergies  Intolerances    SOCIAL HISTORY:   - lives at home and is assisted by a A  - denies smoking, ETOH    FAMILY HISTORY:  Family history of heart disease (Sibling)    HOME MEDICATIONS:  Eliquis 2.5 mg oral tablet: 1 tab(s) orally 2 times a day (13 Aug 2020 02:46)  furosemide 40 mg oral tablet: 1 tab(s) orally once a day (13 Aug 2020 02:46)  Klor-Con 10 mEq oral tablet, extended release: 2 tab(s) orally 2 times a day (13 Aug 2020 02:46)  metoprolol tartrate 25 mg oral tablet: 1 tab(s) orally 2 times a day (13 Aug 2020 02:46)  Synthroid 112 mcg (0.112 mg) oral tablet: 1 tab(s) orally once a day (13 Aug 2020 02:46)    INPATIENT MEDICATIONS  (STANDING):  aspirin enteric coated 81 milliGRAM(s) Oral daily  cefTRIAXone Injectable.      influenza   Vaccine 0.5 milliLiter(s) IntraMuscular once  levothyroxine 100 MICROGram(s) Oral daily  metoprolol tartrate 25 milliGRAM(s) Oral two times a day  nitroglycerin    Patch 0.2 mG/Hr(s) 1 patch Transdermal daily    MEDICATIONS  (PRN):  acetaminophen   Tablet .. 650 milliGRAM(s) Oral every 6 hours PRN Mild Pain (1 - 3)  morphine Concentrate 2.5 milliGRAM(s) Oral every 6 hours PRN dyspnea  ondansetron Injectable 4 milliGRAM(s) IV Push every 6 hours PRN Nausea and/or Vomiting    PHYSICAL EXAM:  Constitutional: Dyspnea w/ conversation, cachectic, pale    HEENT: PERRL, EOMI, MMM.  Neck: Soft and supple, No JVD  Respiratory: Breath sounds b/l rales, + wheezing   Cardiovascular: S1 and S2, IRIR, + murmur   Gastrointestinal: Bowel Sounds present, soft, nontender, nondistended, no guarding, no rebound, no mass.  Extremities: (+) LE edema  Vascular: 2+ peripheral pulses  Neurological: A/O x 3, no focal deficits  Musculoskeletal: 5/5 strength b/l upper and lower extremities  Skin:  no visible rashes.     Vital Signs Last 24 Hrs  T(C): 36.6 (13 Sep 2020 05:23), Max: 36.6 (13 Sep 2020 05:23)  T(F): 97.9 (13 Sep 2020 05:23), Max: 97.9 (13 Sep 2020 05:23)  HR: 94 (13 Sep 2020 05:23) (86 - 97)  BP: 103/61 (13 Sep 2020 05:23) (97/50 - 112/53)  BP(mean): --  RR: 18 (13 Sep 2020 05:23) (18 - 18)  SpO2: 96% (13 Sep 2020 05:23) (94% - 100%) -- 2/3l nc    LABS: All Labs Reviewed****                 RADIOLOGY:  < from: CT Chest No Cont (09.10.20 @ 12:04) >  IMPRESSION:  Bilateral pleural effusions, right more than left, cardiomegaly and question of pulmonary edema.  Anasarca, enlarged main pulmonary artery and additional findings as above.  < end of copied text >    ECHOCARDIOGRAM:  < from: TTE Echo Complete w/o Contrast w/ Doppler (08.13.20 @ 09:52) >  Impression     Summary     Severe aortic stenosis is present.   At least moderate (2+) eccentric aortic regurgitation is present.   The POLLY by continuity equation is .47 cm2   The dimensionless index is .15   The left atrium is severely dilated.   Mild concentric left ventricular hypertrophy is present.   Estimated left ventricular ejection fraction is 50 %.   The IVC is dilated.   Severe mitral stenosis is present.   Severe mitral annular calcification is present.   Severe (4+) eccentric mitral regurgitation is present.   No evidence of pericardial effusion.   Pleural effusion - right pleural effusion.   Mild to moderate pulmonic valvular regurgitation is present.   The right atrium appears moderately dilated.   The right ventricle is not always well visualized, appears grossly normal   in size.   The tricuspid valve leaflets appear mildly thickened and/or calcified, but   open well.   Mild to Moderate Tricuspid regurgitation is present.   Moderate pulmonary hypertension.      EKG (preliminary):  9/8. AF. Inferolateral abnormalities appear grossly unchanged as compared to 08/13/20 tracing. CHIEF COMPLAINT: shortness of beath     HPI: 89 y/o female w/ PMHx significant for Afib, CVA, HTN, chronic diastolic HF, severe AS/MS admitted 09/07/20. Presented from home to ED via EMS.  c/o fall.  reports she, "tripped over [her] own too feet."  denied LOC.  she has very poor recollection of events.  states she was laying on the floor for several hours before EMS arrived.  Recently, patient was hospitalized at  from 08/13-08/15 due to mild exacerbation of HFpEF.   ~ Cardiology was requested to evaluate symptoms. Pt has a history of severe AS and MS for which she is followed by [facility]. She has not seen her cardiologist for a period of time. She was evaluated 3 years ago for valvular intervention but a future course was undecided. She reports that she was relatively asymptomatic at that time    9/12: c/o sob at rest/exertion, dry cough  All other review of systems are negative unless indicated above.      PAST MEDICAL & SURGICAL HISTORY:  Afib  CVA (cerebral vascular accident)  Hypothyroidism  Cerebrovascular accident (CVA)  CHF  Severe AS/MS  Atrial fibrillation  Essential hypertension  Chronic multifocal osteomyelitis, other site  Scarlet fever  S/P debridement    ALLERGIES:  No Known Allergies  Intolerances    SOCIAL HISTORY:   - lives at home and is assisted by a A  - denies smoking, ETOH    FAMILY HISTORY:  Family history of heart disease (Sibling)    HOME MEDICATIONS:  Eliquis 2.5 mg oral tablet: 1 tab(s) orally 2 times a day (13 Aug 2020 02:46)  furosemide 40 mg oral tablet: 1 tab(s) orally once a day (13 Aug 2020 02:46)  Klor-Con 10 mEq oral tablet, extended release: 2 tab(s) orally 2 times a day (13 Aug 2020 02:46)  metoprolol tartrate 25 mg oral tablet: 1 tab(s) orally 2 times a day (13 Aug 2020 02:46)  Synthroid 112 mcg (0.112 mg) oral tablet: 1 tab(s) orally once a day (13 Aug 2020 02:46)    INPATIENT MEDICATIONS  (STANDING):  aspirin enteric coated 81 milliGRAM(s) Oral daily  cefTRIAXone Injectable.      influenza   Vaccine 0.5 milliLiter(s) IntraMuscular once  levothyroxine 100 MICROGram(s) Oral daily  metoprolol tartrate 25 milliGRAM(s) Oral two times a day  nitroglycerin    Patch 0.2 mG/Hr(s) 1 patch Transdermal daily    MEDICATIONS  (PRN):  acetaminophen   Tablet .. 650 milliGRAM(s) Oral every 6 hours PRN Mild Pain (1 - 3)  morphine Concentrate 2.5 milliGRAM(s) Oral every 6 hours PRN dyspnea  ondansetron Injectable 4 milliGRAM(s) IV Push every 6 hours PRN Nausea and/or Vomiting    PHYSICAL EXAM:  Constitutional: Dyspnea w/ conversation, cachectic, pale , more confused/lethargic   HEENT: PERRL, EOMI, MMM.  Neck: Soft and supple, No JVD  Respiratory: Breath sounds b/l rales, + wheezing   Cardiovascular: S1 and S2, IRIR, + murmur   Gastrointestinal: Bowel Sounds present, soft, nontender, nondistended, no guarding, no rebound, no mass.  Extremities: (+) LE edema  Vascular: 2+ peripheral pulses  Neurological: A/O x 1/2, lethargic  Musculoskeletal: 5/5 strength b/l upper and lower extremities  Skin:  no visible rashes.     Vital Signs Last 24 Hrs  T(C): 36.6 (13 Sep 2020 05:23), Max: 36.6 (13 Sep 2020 05:23)  T(F): 97.9 (13 Sep 2020 05:23), Max: 97.9 (13 Sep 2020 05:23)  HR: 94 (13 Sep 2020 05:23) (86 - 97)  BP: 103/61 (13 Sep 2020 05:23) (97/50 - 112/53)  BP(mean): --  RR: 18 (13 Sep 2020 05:23) (18 - 18)  SpO2: 96% (13 Sep 2020 05:23) (94% - 100%) -- 2/3l nc    LABS: All Labs Reviewed:                        12.7   11.79 )-----------( 151      ( 13 Sep 2020 08:12 )             41.1     09-13    139  |  105  |  72<H>  ----------------------------<  76  4.4   |  30  |  1.67<H>    Ca    8.4<L>      13 Sep 2020 08:12    TPro  5.7<L>  /  Alb  2.8<L>  /  TBili  1.9<H>  /  DBili  x   /  AST  1184<H>  /  ALT  1354<H>  /  AlkPhos  77  09-12    PT/INR - ( 12 Sep 2020 07:49 )   PT: 24.9 sec;   INR: 2.21 ratio         RADIOLOGY:  < from: CT Chest No Cont (09.10.20 @ 12:04) >  IMPRESSION:  Bilateral pleural effusions, right more than left, cardiomegaly and question of pulmonary edema.  Anasarca, enlarged main pulmonary artery and additional findings as above.  < end of copied text >    ECHOCARDIOGRAM:  < from: TTE Echo Complete w/o Contrast w/ Doppler (08.13.20 @ 09:52) >  Impression     Summary     Severe aortic stenosis is present.   At least moderate (2+) eccentric aortic regurgitation is present.   The POLLY by continuity equation is .47 cm2   The dimensionless index is .15   The left atrium is severely dilated.   Mild concentric left ventricular hypertrophy is present.   Estimated left ventricular ejection fraction is 50 %.   The IVC is dilated.   Severe mitral stenosis is present.   Severe mitral annular calcification is present.   Severe (4+) eccentric mitral regurgitation is present.   No evidence of pericardial effusion.   Pleural effusion - right pleural effusion.   Mild to moderate pulmonic valvular regurgitation is present.   The right atrium appears moderately dilated.   The right ventricle is not always well visualized, appears grossly normal   in size.   The tricuspid valve leaflets appear mildly thickened and/or calcified, but   open well.   Mild to Moderate Tricuspid regurgitation is present.   Moderate pulmonary hypertension.      EKG (preliminary):  9/8. AF. Inferolateral abnormalities appear grossly unchanged as compared to 08/13/20 tracing. CHIEF COMPLAINT: shortness of beath     HPI: 89 y/o female w/ PMHx significant for Afib, CVA, HTN, chronic diastolic HF, severe AS/MS admitted 09/07/20. Presented from home to ED via EMS.  c/o fall.  reports she, "tripped over [her] own too feet."  denied LOC.  she has very poor recollection of events.  states she was laying on the floor for several hours before EMS arrived.  Recently, patient was hospitalized at  from 08/13-08/15 due to mild exacerbation of HFpEF.   ~ Cardiology was requested to evaluate symptoms. Pt has a history of severe AS and MS for which she is followed by [facility]. She has not seen her cardiologist for a period of time. She was evaluated 3 years ago for valvular intervention but a future course was undecided. She reports that she was relatively asymptomatic at that time    9/13: More lethargic today. ros limited due to lethargy. dyspneic at rest.    PAST MEDICAL & SURGICAL HISTORY:  Afib  CVA (cerebral vascular accident)  Hypothyroidism  Cerebrovascular accident (CVA)  CHF  Severe AS/MS  Atrial fibrillation  Essential hypertension  Chronic multifocal osteomyelitis, other site  Scarlet fever  S/P debridement    ALLERGIES:  No Known Allergies  Intolerances    SOCIAL HISTORY:   - lives at home and is assisted by a A  - denies smoking, ETOH    FAMILY HISTORY:  Family history of heart disease (Sibling)    HOME MEDICATIONS:  Eliquis 2.5 mg oral tablet: 1 tab(s) orally 2 times a day (13 Aug 2020 02:46)  furosemide 40 mg oral tablet: 1 tab(s) orally once a day (13 Aug 2020 02:46)  Klor-Con 10 mEq oral tablet, extended release: 2 tab(s) orally 2 times a day (13 Aug 2020 02:46)  metoprolol tartrate 25 mg oral tablet: 1 tab(s) orally 2 times a day (13 Aug 2020 02:46)  Synthroid 112 mcg (0.112 mg) oral tablet: 1 tab(s) orally once a day (13 Aug 2020 02:46)    INPATIENT MEDICATIONS  (STANDING):  MEDICATIONS  (STANDING):  aspirin enteric coated 81 milliGRAM(s) Oral daily  cefTRIAXone Injectable.      influenza   Vaccine 0.5 milliLiter(s) IntraMuscular once  levothyroxine 100 MICROGram(s) Oral daily  metoprolol tartrate 25 milliGRAM(s) Oral two times a day  nitroglycerin    Patch 0.2 mG/Hr(s) 1 patch Transdermal daily    MEDICATIONS  (PRN):  acetaminophen   Tablet .. 650 milliGRAM(s) Oral every 6 hours PRN Mild Pain (1 - 3)  morphine Concentrate 2.5 milliGRAM(s) Oral every 6 hours PRN dyspnea  ondansetron Injectable 4 milliGRAM(s) IV Push every 6 hours PRN Nausea and/or Vomiting    PHYSICAL EXAM:  Constitutional: Dyspnea w/ conversation, cachectic, pale , more confused/lethargic   HEENT: PERRL, EOMI, MMM.  Neck: Soft and supple, No JVD  Respiratory: Breath sounds b/l rales, + wheezing   Cardiovascular: S1 and S2, IRIR, + murmur   Gastrointestinal: Bowel Sounds present, soft, nontender, nondistended, no guarding, no rebound, no mass.  Extremities: (+) LE edema  Vascular: 2+ peripheral pulses  Neurological: A/O x 1/2, lethargic  Musculoskeletal: 5/5 strength b/l upper and lower extremities  Skin:  no visible rashes.     Vital Signs Last 24 Hrs  T(C): 36.6 (13 Sep 2020 05:23), Max: 36.6 (13 Sep 2020 05:23)  T(F): 97.9 (13 Sep 2020 05:23), Max: 97.9 (13 Sep 2020 05:23)  HR: 94 (13 Sep 2020 05:23) (86 - 97)  BP: 103/61 (13 Sep 2020 05:23) (97/50 - 112/53)  BP(mean): --  RR: 18 (13 Sep 2020 05:23) (18 - 18)  SpO2: 96% (13 Sep 2020 05:23) (94% - 100%) -- 2/3l nc    LABS: All Labs Reviewed:                        12.7   11.79 )-----------( 151      ( 13 Sep 2020 08:12 )             41.1     09-13    139  |  105  |  72<H>  ----------------------------<  76  4.4   |  30  |  1.67<H>    Ca    8.4<L>      13 Sep 2020 08:12    TPro  5.7<L>  /  Alb  2.8<L>  /  TBili  1.9<H>  /  DBili  x   /  AST  1184<H>  /  ALT  1354<H>  /  AlkPhos  77  09-12    PT/INR - ( 12 Sep 2020 07:49 )   PT: 24.9 sec;   INR: 2.21 ratio         RADIOLOGY:  < from: CT Chest No Cont (09.10.20 @ 12:04) >  IMPRESSION:  Bilateral pleural effusions, right more than left, cardiomegaly and question of pulmonary edema.  Anasarca, enlarged main pulmonary artery and additional findings as above.  < end of copied text >    ECHOCARDIOGRAM:  < from: TTE Echo Complete w/o Contrast w/ Doppler (08.13.20 @ 09:52) >  Impression     Summary     Severe aortic stenosis is present.   At least moderate (2+) eccentric aortic regurgitation is present.   The POLLY by continuity equation is .47 cm2   The dimensionless index is .15   The left atrium is severely dilated.   Mild concentric left ventricular hypertrophy is present.   Estimated left ventricular ejection fraction is 50 %.   The IVC is dilated.   Severe mitral stenosis is present.   Severe mitral annular calcification is present.   Severe (4+) eccentric mitral regurgitation is present.   No evidence of pericardial effusion.   Pleural effusion - right pleural effusion.   Mild to moderate pulmonic valvular regurgitation is present.   The right atrium appears moderately dilated.   The right ventricle is not always well visualized, appears grossly normal   in size.   The tricuspid valve leaflets appear mildly thickened and/or calcified, but   open well.   Mild to Moderate Tricuspid regurgitation is present.   Moderate pulmonary hypertension.      EKG (preliminary):  9/8. AF. Inferolateral abnormalities appear grossly unchanged as compared to 08/13/20 tracing.

## 2020-09-14 ENCOUNTER — TRANSCRIPTION ENCOUNTER (OUTPATIENT)
Age: 85
End: 2020-09-14

## 2020-09-14 VITALS
HEART RATE: 96 BPM | RESPIRATION RATE: 18 BRPM | SYSTOLIC BLOOD PRESSURE: 102 MMHG | TEMPERATURE: 98 F | DIASTOLIC BLOOD PRESSURE: 70 MMHG | OXYGEN SATURATION: 98 %

## 2020-09-14 LAB
ALBUMIN SERPL ELPH-MCNC: 2.9 G/DL — LOW (ref 3.3–5)
ALP SERPL-CCNC: 79 U/L — SIGNIFICANT CHANGE UP (ref 40–120)
ALT FLD-CCNC: 949 U/L — HIGH (ref 12–78)
ANION GAP SERPL CALC-SCNC: 7 MMOL/L — SIGNIFICANT CHANGE UP (ref 5–17)
AST SERPL-CCNC: 400 U/L — HIGH (ref 15–37)
BILIRUB SERPL-MCNC: 2 MG/DL — HIGH (ref 0.2–1.2)
BUN SERPL-MCNC: 63 MG/DL — HIGH (ref 7–23)
CALCIUM SERPL-MCNC: 8.7 MG/DL — SIGNIFICANT CHANGE UP (ref 8.5–10.1)
CHLORIDE SERPL-SCNC: 108 MMOL/L — SIGNIFICANT CHANGE UP (ref 96–108)
CO2 SERPL-SCNC: 27 MMOL/L — SIGNIFICANT CHANGE UP (ref 22–31)
CREAT SERPL-MCNC: 1.15 MG/DL — SIGNIFICANT CHANGE UP (ref 0.5–1.3)
GLUCOSE SERPL-MCNC: 77 MG/DL — SIGNIFICANT CHANGE UP (ref 70–99)
HCT VFR BLD CALC: 44.9 % — SIGNIFICANT CHANGE UP (ref 34.5–45)
HGB BLD-MCNC: 13.7 G/DL — SIGNIFICANT CHANGE UP (ref 11.5–15.5)
MCHC RBC-ENTMCNC: 30.5 GM/DL — LOW (ref 32–36)
MCHC RBC-ENTMCNC: 31.1 PG — SIGNIFICANT CHANGE UP (ref 27–34)
MCV RBC AUTO: 102 FL — HIGH (ref 80–100)
PLATELET # BLD AUTO: 159 K/UL — SIGNIFICANT CHANGE UP (ref 150–400)
POTASSIUM SERPL-MCNC: 4.4 MMOL/L — SIGNIFICANT CHANGE UP (ref 3.5–5.3)
POTASSIUM SERPL-SCNC: 4.4 MMOL/L — SIGNIFICANT CHANGE UP (ref 3.5–5.3)
PROT SERPL-MCNC: 6 GM/DL — SIGNIFICANT CHANGE UP (ref 6–8.3)
RBC # BLD: 4.4 M/UL — SIGNIFICANT CHANGE UP (ref 3.8–5.2)
RBC # FLD: 15.2 % — HIGH (ref 10.3–14.5)
SARS-COV-2 RNA SPEC QL NAA+PROBE: SIGNIFICANT CHANGE UP
SODIUM SERPL-SCNC: 142 MMOL/L — SIGNIFICANT CHANGE UP (ref 135–145)
TROPONIN I SERPL-MCNC: 0.1 NG/ML — HIGH (ref 0.01–0.04)
TROPONIN I SERPL-MCNC: 0.1 NG/ML — HIGH (ref 0.01–0.04)
WBC # BLD: 13.31 K/UL — HIGH (ref 3.8–10.5)
WBC # FLD AUTO: 13.31 K/UL — HIGH (ref 3.8–10.5)

## 2020-09-14 PROCEDURE — 99232 SBSQ HOSP IP/OBS MODERATE 35: CPT

## 2020-09-14 PROCEDURE — 99233 SBSQ HOSP IP/OBS HIGH 50: CPT

## 2020-09-14 RX ORDER — POTASSIUM CHLORIDE 20 MEQ
2 PACKET (EA) ORAL
Qty: 0 | Refills: 0 | DISCHARGE

## 2020-09-14 RX ORDER — ACETAMINOPHEN 500 MG
2 TABLET ORAL
Qty: 0 | Refills: 0 | DISCHARGE
Start: 2020-09-14

## 2020-09-14 RX ORDER — ASPIRIN/CALCIUM CARB/MAGNESIUM 324 MG
1 TABLET ORAL
Qty: 0 | Refills: 0 | DISCHARGE
Start: 2020-09-14

## 2020-09-14 RX ORDER — FUROSEMIDE 40 MG
0.5 TABLET ORAL
Qty: 15 | Refills: 0
Start: 2020-09-14 | End: 2020-10-13

## 2020-09-14 RX ORDER — FUROSEMIDE 40 MG
1 TABLET ORAL
Qty: 0 | Refills: 0 | DISCHARGE

## 2020-09-14 RX ORDER — ONDANSETRON 8 MG/1
4 TABLET, FILM COATED ORAL
Qty: 0 | Refills: 0 | DISCHARGE
Start: 2020-09-14

## 2020-09-14 RX ORDER — MORPHINE SULFATE 50 MG/1
0.13 CAPSULE, EXTENDED RELEASE ORAL
Qty: 0 | Refills: 0 | DISCHARGE
Start: 2020-09-14

## 2020-09-14 RX ORDER — FUROSEMIDE 40 MG
1 TABLET ORAL
Qty: 0 | Refills: 0 | DISCHARGE
Start: 2020-09-14

## 2020-09-14 RX ORDER — MORPHINE SULFATE 50 MG/1
1 CAPSULE, EXTENDED RELEASE ORAL EVERY 4 HOURS
Refills: 0 | Status: DISCONTINUED | OUTPATIENT
Start: 2020-09-14 | End: 2020-09-14

## 2020-09-14 RX ORDER — FUROSEMIDE 40 MG
20 TABLET ORAL
Refills: 0 | Status: DISCONTINUED | OUTPATIENT
Start: 2020-09-14 | End: 2020-09-14

## 2020-09-14 RX ORDER — CEFTRIAXONE 500 MG/1
1001 INJECTION, POWDER, FOR SOLUTION INTRAMUSCULAR; INTRAVENOUS
Qty: 0 | Refills: 0 | DISCHARGE
Start: 2020-09-14

## 2020-09-14 RX ADMIN — MORPHINE SULFATE 2.5 MILLIGRAM(S): 50 CAPSULE, EXTENDED RELEASE ORAL at 07:52

## 2020-09-14 RX ADMIN — CEFTRIAXONE 1000 MILLIGRAM(S): 500 INJECTION, POWDER, FOR SOLUTION INTRAMUSCULAR; INTRAVENOUS at 10:13

## 2020-09-14 RX ADMIN — MORPHINE SULFATE 2.5 MILLIGRAM(S): 50 CAPSULE, EXTENDED RELEASE ORAL at 08:57

## 2020-09-14 RX ADMIN — Medication 1 PATCH: at 10:13

## 2020-09-14 RX ADMIN — MORPHINE SULFATE 1 MILLIGRAM(S): 50 CAPSULE, EXTENDED RELEASE ORAL at 09:17

## 2020-09-14 RX ADMIN — MORPHINE SULFATE 1 MILLIGRAM(S): 50 CAPSULE, EXTENDED RELEASE ORAL at 18:57

## 2020-09-14 RX ADMIN — MORPHINE SULFATE 1 MILLIGRAM(S): 50 CAPSULE, EXTENDED RELEASE ORAL at 12:17

## 2020-09-14 RX ADMIN — Medication 100 MICROGRAM(S): at 06:05

## 2020-09-14 RX ADMIN — MORPHINE SULFATE 2.5 MILLIGRAM(S): 50 CAPSULE, EXTENDED RELEASE ORAL at 01:09

## 2020-09-14 RX ADMIN — MORPHINE SULFATE 2.5 MILLIGRAM(S): 50 CAPSULE, EXTENDED RELEASE ORAL at 01:40

## 2020-09-14 RX ADMIN — Medication 0.25 MILLIGRAM(S): at 15:41

## 2020-09-14 RX ADMIN — MORPHINE SULFATE 1 MILLIGRAM(S): 50 CAPSULE, EXTENDED RELEASE ORAL at 18:32

## 2020-09-14 NOTE — DISCHARGE NOTE PROVIDER - NSTOBACCOUSAGEY/N_GEN_A_CS
[FreeTextEntry1] : 43-year-old woman with history of lupus, H. pylori bacteria who presents with complaints of right-sided abdominal pain.\par \par Patient with vague right-sided abdominal pain. Differential diagnoses are broad. She's worried about underlying pathology. Therefore we have recommended HIDA scan with CCK to rule out gallbladder dyskinesia, we have recommended an MRI to assess her liver and her pancreas, we have ordered a colonoscopy to rule out colon lesion etc. The colonoscopy will be done under monitored anesthesia care.   Risks such as perforation requiring surgery, bleeding, etc, and risks of anesthesia including cardiopulmonary compromise were discussed with patient.  Patient verbalized understanding and agrees to proceed with the planned procedure.  She says would like the HIDA scan and MRI first and then will call for a colonoscopy when ready.  \par 
No

## 2020-09-14 NOTE — PROGRESS NOTE ADULT - SUBJECTIVE AND OBJECTIVE BOX
89 y/o female w/ PMHx significant for Afib, CVA, HTN, chronic diastolic HF, severe AS/MS admitted 09/07/20. Presented from home to ED via EMS.  c/o fall.  reports she, "tripped over [her] own too feet."  denied LOC.  she has very poor recollection of events.  states she was laying on the floor for several hours before EMS arrived.  Recently, patient was hospitalized at  from 08/13-08/15 due to mild exacerbation of HFpEF.   ~ Cardiology was requested to evaluate symptoms. Pt has a history of severe AS and MS for which she is followed by [facility]. She has not seen her cardiologist for a period of time. She was evaluated 3 years ago for valvular intervention but a future course was undecided. She reports that she was relatively asymptomatic at that time    9/13: More lethargic today. ros limited due to lethargy. dyspneic at rest.  9/14: difficult to arouse, per nursing staff, baseline.  plan for hospice care.    MEDICATIONS:  OUTPATIENT  Home Medications:  Eliquis 2.5 mg oral tablet: 1 tab(s) orally 2 times a day (13 Aug 2020 02:46)  furosemide 40 mg oral tablet: 1 tab(s) orally once a day (13 Aug 2020 02:46)  Klor-Con 10 mEq oral tablet, extended release: 2 tab(s) orally 2 times a day (13 Aug 2020 02:46)  metoprolol tartrate 25 mg oral tablet: 1 tab(s) orally 2 times a day (13 Aug 2020 02:46)  Synthroid 112 mcg (0.112 mg) oral tablet: 1 tab(s) orally once a day (13 Aug 2020 02:46)      INPATIENT  MEDICATIONS  (STANDING):  aspirin enteric coated 81 milliGRAM(s) Oral daily  cefTRIAXone Injectable.      influenza   Vaccine 0.5 milliLiter(s) IntraMuscular once  levothyroxine 100 MICROGram(s) Oral daily  metoprolol tartrate 25 milliGRAM(s) Oral two times a day  nitroglycerin    Patch 0.2 mG/Hr(s) 1 patch Transdermal daily    MEDICATIONS  (PRN):  acetaminophen   Tablet .. 650 milliGRAM(s) Oral every 6 hours PRN Mild Pain (1 - 3)  LORazepam   Injectable 0.25 milliGRAM(s) IV Push every 4 hours PRN Agitation  morphine  - Injectable 1 milliGRAM(s) IV Push every 4 hours PRN breakthrough dyspnea or pain  morphine Concentrate 2.5 milliGRAM(s) Oral every 6 hours PRN dyspnea  ondansetron Injectable 4 milliGRAM(s) IV Push every 6 hours PRN Nausea and/or Vomiting    Vital Signs Last 24 Hrs  T(C): 36.3 (14 Sep 2020 07:52), Max: 36.4 (13 Sep 2020 15:10)  T(F): 97.4 (14 Sep 2020 07:52), Max: 97.6 (13 Sep 2020 15:10)  HR: 92 (14 Sep 2020 07:52) (57 - 98)  BP: 107/45 (14 Sep 2020 07:52) (90/60 - 111/59)  BP(mean): --  RR: 12 (14 Sep 2020 07:52) (12 - 19)  SpO2: 88% (14 Sep 2020 07:52) (88% - 100%)Daily     Daily I&O's Summary      PHYSICAL EXAM:    Constitutional: lethargic, difficult to arouse.  HEENT: PERRL, EOMI, MMM.  Neck: Soft and supple, No JVD  Respiratory: Breath sounds b/l rales, + wheezing   Cardiovascular: S1 and S2, IRIR, + murmur   Gastrointestinal: Bowel Sounds present, soft, nontender, nondistended, no guarding, no rebound, no mass.  Extremities: mild LE edema  Vascular: 2+ peripheral pulses  Neurological: A/O x 1/2, lethargic  Musculoskeletal: 5/5 strength b/l upper and lower extremities  Skin:  no visible rashes.     LABS: All Labs Reviewed:                        13.7   13.31 )-----------( 159      ( 14 Sep 2020 09:37 )             44.9                         12.7   11.79 )-----------( 151      ( 13 Sep 2020 08:12 )             41.1                         12.6   15.63 )-----------( 189      ( 12 Sep 2020 07:49 )             39.8     14 Sep 2020 09:37    142    |  108    |  63     ----------------------------<  77     4.4     |  27     |  1.15   13 Sep 2020 22:29    x      |  x      |  x      ----------------------------<  x      4.2     |  x      |  x      13 Sep 2020 08:12    139    |  105    |  72     ----------------------------<  76     4.4     |  30     |  1.67     Ca    8.7        14 Sep 2020 09:37  Ca    8.4        13 Sep 2020 08:12  Ca    8.2        12 Sep 2020 07:49    TPro  6.0    /  Alb  2.9    /  TBili  2.0    /  DBili  x      /  AST  400    /  ALT  949    /  AlkPhos  79     14 Sep 2020 09:37  TPro  5.7    /  Alb  2.8    /  TBili  1.9    /  DBili  x      /  AST  1184   /  ALT  1354   /  AlkPhos  77     12 Sep 2020 07:49      CARDIAC MARKERS ( 14 Sep 2020 09:37 )  0.098 ng/mL / x     / x     / x     / x      CARDIAC MARKERS ( 13 Sep 2020 22:29 )  0.101 ng/mL / x     / x     / x     / x        RADIOLOGY:  < from: CT Chest No Cont (09.10.20 @ 12:04) >  IMPRESSION:  Bilateral pleural effusions, right more than left, cardiomegaly and question of pulmonary edema.  Anasarca, enlarged main pulmonary artery and additional findings as above.  < end of copied text >    ECHOCARDIOGRAM:  < from: TTE Echo Complete w/o Contrast w/ Doppler (08.13.20 @ 09:52) >  Impression     Summary     Severe aortic stenosis is present.   At least moderate (2+) eccentric aortic regurgitation is present.   The POLLY by continuity equation is .47 cm2   The dimensionless index is .15   The left atrium is severely dilated.   Mild concentric left ventricular hypertrophy is present.   Estimated left ventricular ejection fraction is 50 %.   The IVC is dilated.   Severe mitral stenosis is present.   Severe mitral annular calcification is present.   Severe (4+) eccentric mitral regurgitation is present.   No evidence of pericardial effusion.   Pleural effusion - right pleural effusion.   Mild to moderate pulmonic valvular regurgitation is present.   The right atrium appears moderately dilated.   The right ventricle is not always well visualized, appears grossly normal   in size.   The tricuspid valve leaflets appear mildly thickened and/or calcified, but   open well.   Mild to Moderate Tricuspid regurgitation is present.   Moderate pulmonary hypertension.      EKG (preliminary):  9/8. AF. Inferolateral abnormalities appear grossly unchanged as compared to 08/13/20 tracing.

## 2020-09-14 NOTE — PROGRESS NOTE ADULT - SUBJECTIVE AND OBJECTIVE BOX
Subjective:    pat lying in bed, sob on & off, hospice care, getting morphine on & Off.    Home Medications:  Eliquis 2.5 mg oral tablet: 1 tab(s) orally 2 times a day (13 Aug 2020 02:46)  furosemide 40 mg oral tablet: 1 tab(s) orally once a day (13 Aug 2020 02:46)  Klor-Con 10 mEq oral tablet, extended release: 2 tab(s) orally 2 times a day (13 Aug 2020 02:46)  metoprolol tartrate 25 mg oral tablet: 1 tab(s) orally 2 times a day (13 Aug 2020 02:46)  Synthroid 112 mcg (0.112 mg) oral tablet: 1 tab(s) orally once a day (13 Aug 2020 02:46)    MEDICATIONS  (STANDING):  aspirin enteric coated 81 milliGRAM(s) Oral daily  cefTRIAXone Injectable.      influenza   Vaccine 0.5 milliLiter(s) IntraMuscular once  levothyroxine 100 MICROGram(s) Oral daily  metoprolol tartrate 25 milliGRAM(s) Oral two times a day  nitroglycerin    Patch 0.2 mG/Hr(s) 1 patch Transdermal daily    MEDICATIONS  (PRN):  acetaminophen   Tablet .. 650 milliGRAM(s) Oral every 6 hours PRN Mild Pain (1 - 3)  LORazepam   Injectable 0.25 milliGRAM(s) IV Push every 4 hours PRN Agitation  morphine  - Injectable 1 milliGRAM(s) IV Push every 4 hours PRN breakthrough dyspnea or pain  morphine Concentrate 2.5 milliGRAM(s) Oral every 6 hours PRN dyspnea  ondansetron Injectable 4 milliGRAM(s) IV Push every 6 hours PRN Nausea and/or Vomiting      Allergies    No Known Allergies    Intolerances        Vital Signs Last 24 Hrs  T(C): 36.3 (14 Sep 2020 07:52), Max: 36.4 (13 Sep 2020 11:26)  T(F): 97.4 (14 Sep 2020 07:52), Max: 97.6 (13 Sep 2020 15:10)  HR: 92 (14 Sep 2020 07:52) (57 - 98)  BP: 107/45 (14 Sep 2020 07:52) (90/60 - 111/59)  BP(mean): --  RR: 12 (14 Sep 2020 07:52) (12 - 20)  SpO2: 88% (14 Sep 2020 07:52) (88% - 100%)      PHYSICAL EXAMINATION:    NECK:  Supple. No lymphadenopathy. Jugular venous pressure not elevated. Carotids equal.   HEART:   The cardiac impulse has a normal quality. Reg., Nl S1 and S2.  There are no murmurs, rubs or gallops noted  CHEST:  Chest crackles to auscultation. Normal respiratory effort.  ABDOMEN:  Soft and nontender.   EXTREMITIES:  There is no edema.       LABS:                        12.7   11.79 )-----------( 151      ( 13 Sep 2020 08:12 )             41.1     09-13    x   |  x   |  x   ----------------------------<  x   4.2   |  x   |  x     Ca    8.4<L>      13 Sep 2020 08:12

## 2020-09-14 NOTE — DISCHARGE NOTE PROVIDER - HOSPITAL COURSE
89 y.o female with   PMH of a. kareen on eliquis, h/o CVA, CHF, Severe AS and MR  HTN, hypothyroidism, presented from home to ED via EMS s/p fall.  # Acute on Chronic diastolic (congestive) heart failure  - NYHA class 4, on supplemental 02 2/3L  # Acute hypoxic respiratory failure 2/2    Acute on Chronic diastolic CHF,  R moderate - large Pleural effusion with severe valvular heart disease   # Mitral stenosis and aortic insufficiency - Echo w/ Severe AS / MS / MR  - She was evaluated 3 years ago for valvular intervention but a future course was undecided. Likely not surgical candidate given current clinical condition  - Palliative consulted to assist with GOC. poor prognosis.   - Echo 8/2020 EF 50%, CT chest w/ large RT effusion, questionable pulmonary edema. on nitro patch  - resume. Lasix 20mg PO every other day, monitor renal function.   - daily weights, I&Os - weight gain noted   - management of pleural effusion per CTS.  - Roxanol started prn for dyspnea.  - CT chest on 9/10 shows Bilateral pleural effusions, right more than left, cardiomegaly and question of pulmonary edema.  Anasarca, enlarged main pulmonary artery   Poor prognosis, stable for discharge to inpatient hospice.       PHYSICAL EXAM:  Vital Signs Last 24 Hrs  T(C): 36.4 (14 Sep 2020 15:30), Max: 36.4 (14 Sep 2020 15:30)  T(F): 97.6 (14 Sep 2020 15:30), Max: 97.6 (14 Sep 2020 15:30)  HR: 96 (14 Sep 2020 15:30) (57 - 98)  BP: 102/70 (14 Sep 2020 15:30) (90/60 - 107/45)  BP(mean): --  RR: 18 (14 Sep 2020 15:30) (12 - 18)  SpO2: 98% (14 Sep 2020 15:30) (88% - 100%)  General: Well developed; malnourished, frail elderly female,  dyspneic on  conversation  Eyes: PERRLA, EOMI; conjunctiva and sclera clear  Head: Normocephalic; atraumatic  ENMT: No nasal discharge; airway clear  Neck: Supple; non tender; no masses  Respiratory: Diminished BS at bases b/l. No  rales   Cardiovascular: Irregular rate and rhythm. S1 and S2 Normal; +  murmur  Gastrointestinal: Soft non-tender non-distended; Normal bowel sounds  Genitourinary: No  suprapubic  tenderness  Extremities: + le edema, venous stasis changes   Neurological: Alert and oriented x 2, non focal  Skin: Warm and dry. No acute rash  Lymph Nodes: No acute cervical adenopathy       Pt is a 89 y.o female with   PMH of a. fib on eliquis, h/o CVA, CHF, Severe AS and MR  HTN, hypothyroidism, presented from home to ED via EMS s/p fall.  # Acute on Chronic diastolic (congestive) heart failure  - NYHA class 4, on supplemental 02 2/3L  # Acute hypoxic respiratory failure 2/2 Acute on Chronic diastolic CHF,  R moderate - large Pleural effusion with severe valvular heart disease   # Mitral stenosis and aortic insufficiency - Echo w/ Severe AS / MS / MR  - She was evaluated 3 years ago for valvular intervention but a future course was undecided. Likely not surgical candidate given current clinical condition  - Palliative consulted to assist with GOC. poor prognosis.   - Echo 8/2020 EF 50%, CT chest w/ large RT effusion, questionable pulmonary edema. on nitro patch  - resume. Lasix 20mg PO every other day, monitor renal function.   - daily weights, I&Os - weight gain noted   - management of pleural effusion per CTS.  - Roxanol started prn for dyspnea.  - CT chest on 9/10 shows Bilateral pleural effusions, right more than left, cardiomegaly and question of pulmonary edema.  - Anasarca, enlarged main pulmonary artery   - Poor prognosis, stable for discharge to inpatient hospice.     PHYSICAL EXAM:  T(C): 36.4 (14 Sep 2020 15:30), Max: 36.4 (14 Sep 2020 15:30)  T(F): 97.6 (14 Sep 2020 15:30), Max: 97.6 (14 Sep 2020 15:30)  HR: 96 (14 Sep 2020 15:30) (57 - 98)  BP: 102/70 (14 Sep 2020 15:30) (90/60 - 107/45)  RR: 18 (14 Sep 2020 15:30) (12 - 18)  SpO2: 98% (14 Sep 2020 15:30) (88% - 100%)  General: Well developed; malnourished, frail elderly female,  dyspneic on  conversation  Eyes: PERRLA, EOMI; conjunctiva and sclera clear  Head: Normocephalic; atraumatic  ENMT: No nasal discharge; airway clear  Neck: Supple; non tender; no masses  Respiratory: Diminished BS at bases b/l. No  rales   Cardiovascular: Irregular rate and rhythm. S1 and S2 Normal; +  murmur  Gastrointestinal: Soft non-tender non-distended; Normal bowel sounds  Genitourinary: No  suprapubic  tenderness  Extremities: + le edema, venous stasis changes   Neurological: Alert and oriented x 2, non focal  Skin: Warm and dry. No acute rash  Lymph Nodes: No acute cervical adenopathy

## 2020-09-14 NOTE — PROGRESS NOTE ADULT - PROBLEM SELECTOR PLAN 4
She has been anticoagulated with Eliquis (despite presence of valve disease) and tolerating anticoagulation; management deferred to her outpatient cardiologist; rate is controlled.
She has been anticoagulated with Eliquis (despite presence of valve disease) and tolerating anticoagulation; management deferred to her outpatient cardiologist; rate is controlled.
mildly elevated troponin. no c/o cp. ECG w/o significant changes  - no statin given elevated LFTs & poor prognosis.  -  ASA 81.

## 2020-09-14 NOTE — PROGRESS NOTE ADULT - PROBLEM SELECTOR PROBLEM 2
Chronic diastolic (congestive) heart failure
Chronic diastolic (congestive) heart failure
Mitral stenosis and aortic insufficiency

## 2020-09-14 NOTE — PROGRESS NOTE ADULT - PROBLEM SELECTOR PLAN 5
low normal range , repeat BP check , monitor , with cautious water intake
low normal range , repeat BP check , monitor , with cautious water intake
off Eliquis for pig-tail. resume eliquis post pig-tail. CTS to adivse.   - on BB BID.

## 2020-09-14 NOTE — PROGRESS NOTE ADULT - NUTRITIONAL ASSESSMENT
This patient has been assessed with a concern for Malnutrition and has been determined to have a diagnosis/diagnoses of Severe protein-calorie malnutrition.    This patient is being managed with:   Diet DASH/TLC-  Sodium & Cholesterol Restricted  Entered: Sep  7 2020  7:43AM

## 2020-09-14 NOTE — PROGRESS NOTE ADULT - PROBLEM SELECTOR PLAN 1
possible due to supplement   discontinue monitor  repeat EKG  check Mg level  noted after accidental fall ,
NYHA class 4, on supplemental 02 2/3L  - Echo 8/2020 EF 50%, CT chest w/ large RT effusion, questionable pulmonary edema. on nitro patch  - lasix qOD restarted, monitor renal function.   - daily weights, I&Os - weight gain noted   - management of pleural effusion per CTS.  - Roxanol started prn for dyspnea.
possible due to supplement

## 2020-09-14 NOTE — DISCHARGE NOTE PROVIDER - NSDCMRMEDTOKEN_GEN_ALL_CORE_FT
acetaminophen 325 mg oral tablet: 2 tab(s) orally every 6 hours, As needed, Mild Pain (1 - 3)  aspirin 81 mg oral delayed release tablet: 1 tab(s) orally once a day  cefTRIAXone: 1001 milligram(s) intravenous once a day  for 4 more days   Eliquis 2.5 mg oral tablet: 1 tab(s) orally 2 times a day  furosemide 20 mg oral tablet: 1 tab(s) orally every 48 hours  metoprolol tartrate 25 mg oral tablet: 1 tab(s) orally 2 times a day  morphine 20 mg/mL oral concentrate: 0.13 milliliter(s) orally every 6 hours, As needed, dyspnea  ondansetron 2 mg/mL injectable solution: 4 milligram(s) injectable every 6 hours  as needed for nausea and vomiting   Synthroid 112 mcg (0.112 mg) oral tablet: 1 tab(s) orally once a day

## 2020-09-14 NOTE — PROGRESS NOTE ADULT - PROBLEM SELECTOR PLAN 2
- Echo w/ Severe AS/MS/MR  - She was evaluated 3 years ago for valvular intervention but conservative management was pursued, also declined intervention on recent eval 2 weeks ago.  - Palliative consulted to assist with GOC. poor prognosis.ol

## 2020-09-14 NOTE — PROGRESS NOTE ADULT - SUBJECTIVE AND OBJECTIVE BOX
HPI: Pt seen and examined this am in follow up for sx and GOC. Pt lethargic, unable to maintain attention. Unable to answer any orientation questions. Only able to endorse discomfort and dyspnea, breathing hard, falling back to sleep repeatedly with continued work of breathing when not attentive. Told pt that we will reach out to son and she nodded her head. Spoke to RN who notes confusion and yelling out overnight with need to put pt in hallway, with dyspnea and chest pain, and need for po morphine not too long before this encounter, still breathless. Reviewed giving stat dose of IV morphine for better sx control.     Called pt's son Riccardo [phone], with no answer, left message.      PAIN: denies  DYSPNEA: yes      ROS:  Unable to gather 2/2 to AMS      PHYSICAL EXAM:    Vital Signs Last 24 Hrs  T(C): 36.3 (14 Sep 2020 07:52), Max: 36.7 (13 Sep 2020 09:15)  T(F): 97.4 (14 Sep 2020 07:52), Max: 98 (13 Sep 2020 09:15)  HR: 92 (14 Sep 2020 07:52) (57 - 98)  BP: 107/45 (14 Sep 2020 07:52) (90/60 - 111/59)  BP(mean): --  RR: 12 (14 Sep 2020 07:52) (12 - 20)  SpO2: 88% (14 Sep 2020 07:52) (88% - 100%)  Daily     Daily       PPSV2: 30  %    General: Elderly female lying in bed, lethargic and breathless  Mental Status: AOx1 responds to name only  HEENT: mmm, +temporal wasting  Lungs: dec at bases bl  Cardiac: + s1 s2 rrr  GI: soft nt nd +bs, diaper in place  : voids  Skin/msk: moves all extremities, pitting dependent edema bl le to ankles, muscle and fat wasting throughout  Neuro: limited by ams    LABS:                        12.7   11.79 )-----------( 151      ( 13 Sep 2020 08:12 )             41.1     09-13    x   |  x   |  x   ----------------------------<  x   4.2   |  x   |  x     Ca    8.4<L>      13 Sep 2020 08:12        Albumin: Albumin, Serum: 2.8 g/dL (09-12 @ 07:49)      Allergies    No Known Allergies    Intolerances      MEDICATIONS  (STANDING):  aspirin enteric coated 81 milliGRAM(s) Oral daily  cefTRIAXone Injectable.      influenza   Vaccine 0.5 milliLiter(s) IntraMuscular once  levothyroxine 100 MICROGram(s) Oral daily  metoprolol tartrate 25 milliGRAM(s) Oral two times a day  nitroglycerin    Patch 0.2 mG/Hr(s) 1 patch Transdermal daily    MEDICATIONS  (PRN):  acetaminophen   Tablet .. 650 milliGRAM(s) Oral every 6 hours PRN Mild Pain (1 - 3)  morphine  - Injectable 1 milliGRAM(s) IV Push every 4 hours PRN breakthrough dyspnea or pain  morphine Concentrate 2.5 milliGRAM(s) Oral every 6 hours PRN dyspnea  ondansetron Injectable 4 milliGRAM(s) IV Push every 6 hours PRN Nausea and/or Vomiting      RADIOLOGY:

## 2020-09-14 NOTE — GOALS OF CARE CONVERSATION - ADVANCED CARE PLANNING - NSGCTIMESPENT_GEN_ALL_CORE
Final Anesthesia Post-op Assessment    Patient: Radha Christopher  Procedure(s) Performed: LEFT CATARACT EXTRACTION WITH INTRAOCULAR LENS IMPLANTATION   Anesthesia type: Monitor Anesthesia Care    Vital Last Value   Temperature 36.8 °C (98.2 °F) (05/30/18 1046)   Pulse (!) 49 (05/30/18 1331)   Respiratory Rate 12 (05/30/18 1331)   Non-Invasive   Blood Pressure 160/74 (05/30/18 1331)   Arterial  Blood Pressure     Pulse Oximetry 100 % (05/30/18 1331)     Last 24 I/O: No intake or output data in the 24 hours ending 05/30/18 1331    PATIENT LOCATION: PACU Phase 2  POST-OP VITAL SIGNS: stable  LEVEL OF CONSCIOUSNESS: participates in exam, awake, oriented, answers questions appropriately and alert  POST-OP ASSESSMENT: no complications and sufficiently recovered from acute administration of anesthesia effects and able to participate in evaluation  COMPLICATIONS: none      
22

## 2020-09-14 NOTE — DISCHARGE NOTE PROVIDER - NSDCCPCAREPLAN_GEN_ALL_CORE_FT
PRINCIPAL DISCHARGE DIAGNOSIS  Diagnosis: Diastolic CHF, acute on chronic  Assessment and Plan of Treatment: # Acute on Chronic diastolic (congestive) heart failure  - NYHA class 4, on supplemental 02 2/3L  # Acute hypoxic respiratory failure 2/2    Acute on Chronic diastolic CHF,  R moderate - large Pleural effusion with severe valvular heart disease   # Mitral stenosis and aortic insufficiency - Echo w/ Severe AS / MS / MR  - She was evaluated 3 years ago for valvular intervention but a future course was undecided. Likely not surgical candidate given current clinical condition  - Palliative consulted to assist with GOC. poor prognosis.   - Echo 8/2020 EF 50%, CT chest w/ large RT effusion, questionable pulmonary edema. on nitro patch  - resume. Lasix 20mg PO every other day, monitor renal function.   - daily weights, I&Os - weight gain noted   - management of pleural effusion per CTS.  - Roxanol started prn for dyspnea.  - CT chest on 9/10 shows Bilateral pleural effusions, right more than left, cardiomegaly and question of pulmonary edema.  Anasarca, enlarged main pulmonary artery         SECONDARY DISCHARGE DIAGNOSES  Diagnosis: Acute UTI  Assessment and Plan of Treatment: Rocephin IV 1000 mg for a total of 7 days - 5 days left    Diagnosis: Pleural effusion due to CHF (congestive heart failure)  Assessment and Plan of Treatment: elevated INR due to malnutrition,  CT Ss - no thracentesis due to elevated INR    Diagnosis: Dehydration  Assessment and Plan of Treatment: due to malnutrition due to terminal disease    Diagnosis: LULA (acute kidney injury)  Assessment and Plan of Treatment: due to malnutrition     PRINCIPAL DISCHARGE DIAGNOSIS  Diagnosis: Diastolic CHF, acute on chronic  Assessment and Plan of Treatment: # Acute on Chronic diastolic (congestive) heart failure  - NYHA class 4, on supplemental 02 2/3L  # Acute hypoxic respiratory failure 2/2 Acute on Chronic diastolic CHF,  R moderate - large Pleural effusion with severe valvular heart disease   # Mitral stenosis and aortic insufficiency - Echo w/ Severe AS / MS / MR  - Care discussed with patient's son  - She was evaluated 3 years ago for valvular intervention but a future course was undecided. Likely not surgical candidate given current clinical condition  - palliative - poor prognosis  - In patient hospice 9/14  - Roxanol started prn for dyspnea.  - CT chest on 9/10 shows Bilateral pleural effusions, right more than left, cardiomegaly and question of pulmonary edema. Anasarca, enlarged main pulmonary artery   - son wants no intervention as patient wants to go away easy / comfortable, no extensive interventions        SECONDARY DISCHARGE DIAGNOSES  Diagnosis: Acute UTI  Assessment and Plan of Treatment: - Rocephin IV 1000 mg for a total of 7 days - 5 days left    Diagnosis: Pleural effusion due to CHF (congestive heart failure)  Assessment and Plan of Treatment: - elevated INR due to malnutrition,  - CT Ss - no thracentesis due to elevated INR / patient does not wants any intervention.    Diagnosis: Dehydration  Assessment and Plan of Treatment: - due to malnutrition due to terminal disease    Diagnosis: LULA (acute kidney injury)  Assessment and Plan of Treatment: - due to malnutrition

## 2020-09-14 NOTE — GOALS OF CARE CONVERSATION - ADVANCED CARE PLANNING - CONVERSATION DETAILS
Son/HCP Riccardo called back. He explained that he has been speaking to pt and able to have somewhat lucid conversation with her over the weekend where she said all she wanted was to be comfortable. This is echoed in her Living Will. He was, thus, upset about her being so uncomfortable overnight. He confirmed speaking with hospitalist yesterday and after hearing more about hospice, confirmed that he indeed was interested in pushing forward with a referral. He required some explanation about the benefits of moving pt, and we referenced last night's events to further bolster opinion that pt will receive more comprehensive comfort care at hospice than in acute care hospital. He would like comfort measures, also offering understanding that more IV fluids would be deleterious at this time, considering retention, effusions, and already mounting dyspnea. He understands she will be given comfort feeds if thirst or hunger is endorsed at hospice. Reassured him that pt remains confused, unable to orient, and at this point recommended focus is on moving pt to appropriate setting for reaching previously established goal of comfort. Understanding this, he agreed to allow floor RENETTA to send [facility] inpt hospice referral and for pt to be sent there when accepted and bed available.     Noted that [facility] and floor RENETTA [place] would call to arrange.     [facility] RENETTA and Dr. Lopez made aware.

## 2020-09-14 NOTE — PROGRESS NOTE ADULT - ASSESSMENT
PROBLEMS:    S/p Fall  likely mechanical   Aspiration pmeumonia  B/L pleural effusion right>lef  Acute on Chronic diastolic CHF 2/2 valvular Dz-severe MR and AS  Hyperkalemia with LULA/CKD stage II-resolved  Severe MR and AS  Hypothyroidism  Chronic AFIB  Severe protein calorie malnutrition     PLAN:    hospice placement-iv morphine as needed  iv rhocephin  Keep neg balance  pleural effusion symptomatic rx  supportive care  CT surgery fu  dvt prophylasix

## 2020-09-14 NOTE — DISCHARGE NOTE PROVIDER - NSDCCPTREATMENT_GEN_ALL_CORE_FT
PRINCIPAL PROCEDURE  Procedure: CT chest wo con  Findings and Treatment: EXAM:  CT CHEST                        PROCEDURE DATE:  09/10/2020    INTERPRETATION:  CLINICAL INFORMATION: Pleural effusion  COMPARISON: 04/25/2018.  PROCEDURE:  CT of the Chest was performed without intravenous contrast.  Sagittal and coronal reformats were performed.  FINDINGS:  LUNGS AND AIRWAYS: Patent central airways.  Compression atelectasis dependent lower lobes. A few septal lines and ill-defined groundglass opacity concerning for pulmonary edema.  PLEURA: Moderatelylarge right and a small left pleural effusion.  MEDIASTINUM AND DONALDO: No lymphadenopathy.  VESSELS: Coronary artery calcification. Enlarged main pulmonary artery measuring 4.2 cm. Correlate for pulmonary arterial hypertension.  HEART: Cardiomegaly. Aortic valve and mitral annulus calcification. No significant pericardial effusion.  CHEST WALL AND LOWER NECK: Anasarca.  VISUALIZED UPPER ABDOMEN: Grossly unremarkable.  BONES: Within normal limits.  IMPRESSION:  Bilateral pleural effusions, right more than left, cardiomegaly and question of pulmonary edema.  Anasarca, enlarged main pulmonary artery and additional findings as above.        SECONDARY PROCEDURE  Procedure: Transthoracic echocardiography (TTE)  Findings and Treatment: Impression   Summary   Severe aortic stenosis is present.   At least moderate (2+) eccentric aortic regurgitation is present.   The POLLY by continuity equation is .47 cm2   The dimensionless index is .15   The left atrium is severely dilated.   Mild concentric left ventricular hypertrophy is present.   Estimated left ventricular ejection fraction is 50 %.   The IVC is dilated.   Severe mitral stenosis is present.   Severe mitral annular calcification is present.   Severe (4+) eccentric mitral regurgitation is present.   No evidence of pericardial effusion.   Pleural effusion - right pleural effusion.   Mild to moderate pulmonic valvular regurgitation is present.   The right atrium appears moderately dilated.   The right ventricle is not always well visualized, appears grossly normal   in size.   The tricuspid valve leaflets appear mildly thickened and/or calcified, but   open well.   Mild to Moderate Tricuspid regurgitation is present.   Moderate pulmonary hypertension.

## 2020-09-14 NOTE — DISCHARGE NOTE NURSING/CASE MANAGEMENT/SOCIAL WORK - PATIENT PORTAL LINK FT
You can access the FollowMyHealth Patient Portal offered by [facility] by registering at the following website: [email or web address]. By joining [employer]’s FollowMyHealth portal, you will also be able to view your health information using other applications (apps) compatible with our system.

## 2020-09-14 NOTE — PROGRESS NOTE ADULT - PROBLEM SELECTOR PLAN 3
Severe AS and severe MS -- apparently was evaluated in the past for intervention with Dr Paez and declined.  follow up with his out patient cardiologist for further plan ,
Possible pigtail cath placement. Management per CTS.
Severe AS and severe MS -- apparently was evaluated in the past for intervention with Dr Paez and declined.  follow up with his out patient cardiologist for further plan ,

## 2020-09-14 NOTE — PROGRESS NOTE ADULT - ASSESSMENT
89y old Female coming from home with hx of Afib on Eliquis, CVA, CHF, Severe AS and MR  HTN, hypothyroidism, admitted 9/7 for mechanical fall, and as per notes pt was laying on the floor for several hours before EMS arrived. Of note pt recently hospitalized at  from 08/13-08/15 due to mild exacerbation of HFpEF; among her DC medications were potassium 20mEq po bid and Lasix 40mg po qd. In ED, potassium was found to be elevated (6.8), LULA (resolved with IVF), mildly elevated trops (2/2 to demand), and patient refused  further imaging (CXR as well as b/l hip and pelvis xray), dyspnea noted due to CHF. Palliative Care consulted to establish GOC- pt known to our service from last admission.      1) AMS  - mounting confusion  - unclear etiology, possibly due hypoxia, seemed to have started at end of last week and worsened through weekend  - fall and aspiration precautions  - ordered ativan 0.25 mg q4h prn agitation    2) Dyspnea/CHF  - CHF with valvular disease  - cardio notes appreciated- class II HF, not a candidate for repair, rec to consider hospice (Pt not ready for that option)  - as per primary team imaging showing effusion and plan for CT to consider tap for palliative purposes  - CT surg notes appreciated- unable to get pt to consent and suggesting pause to reevaluate GOC  - c/w supplemental O2  - on lasix as per team  - c/w low dose roxanol 2.5 mg  q6hprn  - with persistent sx this am despite this thus reordered IV morphine 1mg q4h prn breakthrough.   - Spoke with Dr. Goldsmith who agrees that morphine has been helpful for pt's discomfort  - wbc fluctuating (not on steroids)    3) Debility/Fall  - PPS<40% at baseline  - 7d/week of aids for ADLs  - PT note appreciated recommended S[facility] vs. home with PT     4) Prognosis  - poor  - in setting of advanced age, CHF with severe valvular disorder that pt is not candidate for, O2-dependance, increasing debility with fall, recurrent hospitalization, evidence of malnutrition on exam, PPS<40% with 7d/week of aid support for ADLs, would fit criteria for hospice.     5) GOC/Advanced Directives  - pt has capacity for decision making  - Riccardo, but pt does not want him called  - BEAU- DNR and DNI (9/7/20)  - GOC conversation held- see consult note for details- wants home with pall, not ready for hospice--> however now with AMS and continues to have poor prognosis, declining with mounting sx burden, notes indicating HCP may be interested in transition to comfort via inpt hospice. Message left, awaiting callback to confirm.     Thank you for including us in Ms. Sandoval's care. Will continue to follow with you.    Shama Barba MD  Palliative Care Attending

## 2020-09-14 NOTE — PROGRESS NOTE ADULT - SUBJECTIVE AND OBJECTIVE BOX
Subjective:  Patient resting comfortably.    Vital Signs:  Vital Signs Last 24 Hrs  T(C): 36.3 (09-14-20 @ 07:52), Max: 36.4 (09-13-20 @ 15:10)  T(F): 97.4 (09-14-20 @ 07:52), Max: 97.6 (09-13-20 @ 15:10)  HR: 92 (09-14-20 @ 07:52) (57 - 98)  BP: 107/45 (09-14-20 @ 07:52) (90/60 - 111/59)  RR: 12 (09-14-20 @ 07:52) (12 - 19)  SpO2: 88% (09-14-20 @ 07:52) (88% - 100%) on 2-3 L NC      Relevant labs, radiology and Medications reviewed                        13.7   13.31 )-----------( 159      ( 14 Sep 2020 09:37 )             44.9     09-14    142  |  108  |  63<H>  ----------------------------<  77  4.4   |  27  |  1.15    Ca    8.7      14 Sep 2020 09:37    TPro  6.0  /  Alb  2.9<L>  /  TBili  2.0<H>  /  DBili  x   /  AST  400<H>  /  ALT  949<H>  /  AlkPhos  79  09-14      MEDICATIONS  (STANDING):  aspirin enteric coated 81 milliGRAM(s) Oral daily  cefTRIAXone Injectable.      furosemide    Tablet 20 milliGRAM(s) Oral <User Schedule>  influenza   Vaccine 0.5 milliLiter(s) IntraMuscular once  levothyroxine 100 MICROGram(s) Oral daily  metoprolol tartrate 25 milliGRAM(s) Oral two times a day  nitroglycerin    Patch 0.2 mG/Hr(s) 1 patch Transdermal daily    MEDICATIONS  (PRN):  acetaminophen   Tablet .. 650 milliGRAM(s) Oral every 6 hours PRN Mild Pain (1 - 3)  LORazepam   Injectable 0.25 milliGRAM(s) IV Push every 4 hours PRN Agitation  morphine  - Injectable 1 milliGRAM(s) IV Push every 4 hours PRN breakthrough dyspnea or pain  morphine Concentrate 2.5 milliGRAM(s) Oral every 6 hours PRN dyspnea  ondansetron Injectable 4 milliGRAM(s) IV Push every 6 hours PRN Nausea and/or Vomiting      Physical Exam:  Gen:  NAD, breathing comfortably  Neuro: opens eyes, but lethargic  Card:  S1 S2  Pulm:  decreased breath sounds  Abd:  soft NT ND  Ext:  no edema        Assessment  89y Female  w/ PAST MEDICAL & SURGICAL HISTORY:  Afib    CVA (cerebral vascular accident)    Hypothyroidism    Cerebrovascular accident (CVA)    Atrial fibrillation    Essential hypertension    Chronic multifocal osteomyelitis, other site    Scarlet fever    S/P debridement    Patient is a 89y old  Female who presents with a chief complaint of Shortness of breath (14 Sep 2020 12:57)    89y Female pmhx CVA, CHF, severe AS and MS, mod pulm HTN, AF on AC admitted 9/7 s/p fall at home. Pt w SOB, b/l effusions. CT chest today shows a large right effusion. Called for drainage.  Persistent effusion - INR elevated due to malnourishment and vitamin deficiency.  Patient recent chest pain with elevated troponins.  Family focusing on comfort.      Will sign off, please reconsult as needed #[id number].    Discussed with Cardiothoracic Team at AM rounds.

## 2020-09-14 NOTE — CHART NOTE - NSCHARTNOTEFT_GEN_A_CORE
Contacted by RN.  Patient was complaining of new onset chest pain.    Patient was examined at bedside, she speaks on one or two words sentences.  She states she feels like pressure with radiation to the left arm.  ASA 81 mg was given to the patient. EKG was performed and showed no significant changes from previous EKG.  Troponin stat was sent to the patient and was 0.101.    Went to rechecked the patient again and she states the chest pain resolved.

## 2020-09-14 NOTE — PROGRESS NOTE ADULT - PROVIDER SPECIALTY LIST ADULT
Cardiology
Cardiology
Hospitalist
Palliative Care
Pulmonology
Pulmonology
Thoracic Surgery
Hospitalist
Cardiology

## 2020-09-14 NOTE — PROGRESS NOTE ADULT - ATTENDING COMMENTS
Patient seen and examined. Plan for chest tube placement whenever coags are appropriate. She seems to be saturating well on few liters NC.   Will do the procedure when appropriate.  Last dose of Eliquis was yesterday   Procedure was discussed with the son and consent was given from him as well.
Andrey Chand M.D.  Cardiology, [facility]  Cell:   Office: [phone] ([place])  ([facility]

## 2020-09-18 DIAGNOSIS — I50.33 ACUTE ON CHRONIC DIASTOLIC (CONGESTIVE) HEART FAILURE: ICD-10-CM

## 2020-09-18 DIAGNOSIS — J69.0 PNEUMONITIS DUE TO INHALATION OF FOOD AND VOMIT: ICD-10-CM

## 2020-09-18 DIAGNOSIS — Z79.82 LONG TERM (CURRENT) USE OF ASPIRIN: ICD-10-CM

## 2020-09-18 DIAGNOSIS — Z51.5 ENCOUNTER FOR PALLIATIVE CARE: ICD-10-CM

## 2020-09-18 DIAGNOSIS — I08.0 RHEUMATIC DISORDERS OF BOTH MITRAL AND AORTIC VALVES: ICD-10-CM

## 2020-09-18 DIAGNOSIS — N18.3 CHRONIC KIDNEY DISEASE, STAGE 3 (MODERATE): ICD-10-CM

## 2020-09-18 DIAGNOSIS — Z79.01 LONG TERM (CURRENT) USE OF ANTICOAGULANTS: ICD-10-CM

## 2020-09-18 DIAGNOSIS — N17.9 ACUTE KIDNEY FAILURE, UNSPECIFIED: ICD-10-CM

## 2020-09-18 DIAGNOSIS — E03.9 HYPOTHYROIDISM, UNSPECIFIED: ICD-10-CM

## 2020-09-18 DIAGNOSIS — I48.21 PERMANENT ATRIAL FIBRILLATION: ICD-10-CM

## 2020-09-18 DIAGNOSIS — Z95.0 PRESENCE OF CARDIAC PACEMAKER: ICD-10-CM

## 2020-09-18 DIAGNOSIS — G92 TOXIC ENCEPHALOPATHY: ICD-10-CM

## 2020-09-18 DIAGNOSIS — J96.01 ACUTE RESPIRATORY FAILURE WITH HYPOXIA: ICD-10-CM

## 2020-09-18 DIAGNOSIS — I13.0 HYPERTENSIVE HEART AND CHRONIC KIDNEY DISEASE WITH HEART FAILURE AND STAGE 1 THROUGH STAGE 4 CHRONIC KIDNEY DISEASE, OR UNSPECIFIED CHRONIC KIDNEY DISEASE: ICD-10-CM

## 2020-09-18 DIAGNOSIS — Z86.73 PERSONAL HISTORY OF TRANSIENT ISCHEMIC ATTACK (TIA), AND CEREBRAL INFARCTION WITHOUT RESIDUAL DEFICITS: ICD-10-CM

## 2020-09-18 DIAGNOSIS — Z66 DO NOT RESUSCITATE: ICD-10-CM

## 2020-09-18 DIAGNOSIS — E43 UNSPECIFIED SEVERE PROTEIN-CALORIE MALNUTRITION: ICD-10-CM

## 2020-09-18 DIAGNOSIS — I27.20 PULMONARY HYPERTENSION, UNSPECIFIED: ICD-10-CM

## 2020-09-18 DIAGNOSIS — E87.5 HYPERKALEMIA: ICD-10-CM

## 2021-08-07 NOTE — DISCHARGE NOTE PROVIDER - PROVIDER RX CONTACT NUMBER
What do you do next:   Continue your home medications unless we have specifically changed them  Use the steroid medication I have prescribed as directed.  If you feel that you need to use Benadryl in the future, you may use 25 to 50 mg by mouth every 6 hours.  It can be dangerous to exceed this.  You took a total of 175 mg of Benadryl in a relatively short period of time.  Follow up as indicated below    When do you return: If you have lightheadedness, fainting, severe abdominal pain with nausea and vomiting, shortness of breath or tightness in your throat, or any other symptoms that concern you, please return to the ED for reevaluation.    Thank you for allowing us to care for you today.     [phone]

## 2022-06-24 NOTE — DISCHARGE NOTE PROVIDER - CARE PROVIDERS DIRECT ADDRESSES
[de-identified] : The patient is here today for a Euflexxa injection (3rd injection) for the bilateral knees. The patient is getting some improvement.\par Allergies: The patient denies allergies to medications and has no allergies to chicken,eggs, or feathers.\par Procedure: The patient has been identified by name and date of birth. Patient confirms that we are treating the bilateral knees today.\par The knees were prepped in the usual sterile fashion. The areas were cleansed with chlorhexadine, then sprayed with ethyl chloride. The patient was then injected with the Euflexxa into the bilateral knees in the anterolateral position. The patient tolerated the procedure well. The medication was delivered aseptically and atraumatically.\par Diagnosis: Osteoarthritis of the bilateral knees\par Treatment: The patient was advised on the activities for today. I gave the patient instructions on postinjection ice and analgesia.\par Follow up in 6 weeks for repeat evaluation\par 
,DirectAddress_Unknown

## 2022-07-19 NOTE — PROGRESS NOTE ADULT - ASSESSMENT
86 y/o F p/w RLE and RUE weakness. Pt states she awoke this morning (went to bed around 10pm) and could not move her arm or leg which prompted her to have EMS bring her to the ER CT/CTA +ve for acute thrombus in left MCA ED Physician Dr. Richardson spoke with Dr. Chase from stroke center he states pt is not a candidate for thrombectomy tba to [facility]     * New onset AF, fairly rate controlled at the moment. CHADSVASC score 7 ( HTN, Age >75, Stroke, Vascular disease, female).  Needs AC when safe from neuro stand point, agree with UFH first and when stroke is stable then possibly with a NOAC.  IF needed for rate control, can start PO diltiazem if able to tolerate oral route, otherwise can use IV drip.  2d echo, tele.    * Left carotid artery stenosis, ipsilateral to stroke and > 70% stenosis, agree with obtaining opinion from  vascular surgery re: CEA vs PTA. Otherwise, cont antiplatelet, statin. This stroke could be embolic from AF but also artery to artery embolization is possible.     4/16-  concerned about her intermittent weakness in the right upper extremity. She is not therapeutic yet on coumadin.   will start IV Heparin while she reaches an adequate INR.  Needs a CEA but need to determine the correct timing. Will have to speak to Dr Sanchez.  physical therapy very important at this time.   baby aspirin 81 mg daily.   continue metoprolol for afib rate control. No

## 2022-11-16 NOTE — CONSULT NOTE ADULT - PROBLEM SELECTOR PROBLEM 2
----- Message from Adrianna Barriga DO sent at 11/16/2022  4:49 PM CST -----  Low Vitamin D. Increase any current supplementation by 2000 IU   Permanent atrial fibrillation

## 2023-01-13 NOTE — ED PROVIDER NOTE - NIH STROKE SCALE: 9. BEST LANGUAGE
----- Message from Nay Rosenbaum sent at 12/17/2022 11:01 AM CST -----  Regarding: FW: wants to see qian at a later date  Look at held Qian spots  ----- Message -----  From: Elsie Moran  Sent: 12/15/2022   2:23 PM CST  To: [facility] Coordin[facility]  Subject: wants to see qian at a later date               Please reschedule this patient with Qian Villalta for another date per patient.        (0) No aphasia; normal

## 2023-04-27 NOTE — CONSULT NOTE ADULT - TREATMENT GUIDELINES
[facility]  coding opportunities       Chart reviewed, no opportunity found:   [address]        Patients Insurance     Medicare Insurance: [employer] Ad[employer]
DNR Order/DNI

## 2023-10-07 NOTE — PHYSICAL THERAPY INITIAL EVALUATION ADULT - DID THE PATIENT HAVE SURGERY?
Received consult for enteral support. Oral diet initiated by SLP - bolus enteral support following meals ordered per floor RD recommendations from 10/6. Will continue to follow per protocol.      n/a

## 2024-09-14 NOTE — CONSULT NOTE ADULT - PROBLEM SELECTOR RECOMMENDATION 4
- mildly elevated troponin. no c/o cp. ECG w/o significant changes  - no statin given elevated LFTs   - start ASA 81 No